# Patient Record
Sex: FEMALE | Race: WHITE | Employment: OTHER | URBAN - METROPOLITAN AREA
[De-identification: names, ages, dates, MRNs, and addresses within clinical notes are randomized per-mention and may not be internally consistent; named-entity substitution may affect disease eponyms.]

---

## 2017-09-07 ENCOUNTER — OFFICE VISIT (OUTPATIENT)
Dept: FAMILY MEDICINE CLINIC | Facility: CLINIC | Age: 62
End: 2017-09-07

## 2017-09-07 VITALS
HEART RATE: 61 BPM | DIASTOLIC BLOOD PRESSURE: 78 MMHG | WEIGHT: 120.5 LBS | BODY MASS INDEX: 23 KG/M2 | SYSTOLIC BLOOD PRESSURE: 119 MMHG

## 2017-09-07 DIAGNOSIS — H61.23 BILATERAL IMPACTED CERUMEN: Primary | ICD-10-CM

## 2017-09-07 PROCEDURE — 99213 OFFICE O/P EST LOW 20 MIN: CPT | Performed by: FAMILY MEDICINE

## 2017-09-07 PROCEDURE — 69209 REMOVE IMPACTED EAR WAX UNI: CPT | Performed by: FAMILY MEDICINE

## 2017-09-07 NOTE — PROGRESS NOTES
HPI:    Patient ID: Andie Melendez is a 58year old female. HPI  Patient presents with:  Hearing Loss: pt can't hear out of both ears    Review of Systems   Constitutional: Negative. HENT: Positive for ear pain and hearing loss.              Current

## 2017-09-12 ENCOUNTER — OFFICE VISIT (OUTPATIENT)
Dept: FAMILY MEDICINE CLINIC | Facility: CLINIC | Age: 62
End: 2017-09-12

## 2017-09-12 DIAGNOSIS — H61.23 BILATERAL IMPACTED CERUMEN: Primary | ICD-10-CM

## 2017-09-12 DIAGNOSIS — H61.22 IMPACTED CERUMEN OF LEFT EAR: ICD-10-CM

## 2017-09-12 PROCEDURE — 99213 OFFICE O/P EST LOW 20 MIN: CPT | Performed by: FAMILY MEDICINE

## 2017-09-12 PROCEDURE — 69209 REMOVE IMPACTED EAR WAX UNI: CPT | Performed by: FAMILY MEDICINE

## 2017-09-13 NOTE — PROGRESS NOTES
HPI:    Patient ID: Vincent Gooden is a 58year old female. HPI  Patient presents with:  Ear Wax: f/u bilateral ear wash    Review of Systems   Constitutional: Negative. HENT: Positive for hearing loss.              Current Outpatient Prescriptions:

## 2017-09-20 ENCOUNTER — OFFICE VISIT (OUTPATIENT)
Dept: OTOLARYNGOLOGY | Facility: CLINIC | Age: 62
End: 2017-09-20

## 2017-09-20 VITALS
TEMPERATURE: 97 F | HEIGHT: 60 IN | DIASTOLIC BLOOD PRESSURE: 76 MMHG | WEIGHT: 120 LBS | BODY MASS INDEX: 23.56 KG/M2 | SYSTOLIC BLOOD PRESSURE: 132 MMHG

## 2017-09-20 DIAGNOSIS — H61.23 BILATERAL IMPACTED CERUMEN: Primary | ICD-10-CM

## 2017-09-20 PROCEDURE — 69210 REMOVE IMPACTED EAR WAX UNI: CPT | Performed by: OTOLARYNGOLOGY

## 2017-09-20 PROCEDURE — 99203 OFFICE O/P NEW LOW 30 MIN: CPT | Performed by: OTOLARYNGOLOGY

## 2017-09-20 NOTE — PROGRESS NOTES
Karine Garrison is a 58year old female. Patient presents with:  Ear Problem: right ear cleaning, clinic dr Shirin Hodgson get wax out         HISTORY OF PRESENT ILLNESS  9/20/2017   Here for evaluation of   hearing loss.  Patient feels this has worsened over the Hema/Lymph Negative Easy bleeding and easy bruising.      PHYSICAL EXAM    /76 (BP Location: Left arm)   Temp (!) 96.6 °F (35.9 °C) (Tympanic)   Ht 5' (1.524 m)   Wt 120 lb (54.4 kg)   BMI 23.44 kg/m²     System Findings Details   Skin Normal Inspec

## 2020-02-04 ENCOUNTER — HOSPITAL ENCOUNTER (INPATIENT)
Facility: HOSPITAL | Age: 65
LOS: 7 days | Discharge: HOME HEALTH | DRG: 246 | End: 2020-02-11
Attending: EMERGENCY MEDICINE | Admitting: HOSPITALIST
Payer: MEDICARE

## 2020-02-04 ENCOUNTER — NURSE TRIAGE (OUTPATIENT)
Dept: OTHER | Age: 65
End: 2020-02-04

## 2020-02-04 ENCOUNTER — APPOINTMENT (OUTPATIENT)
Dept: INTERVENTIONAL RADIOLOGY/VASCULAR | Facility: HOSPITAL | Age: 65
DRG: 246 | End: 2020-02-04
Attending: INTERNAL MEDICINE
Payer: MEDICARE

## 2020-02-04 ENCOUNTER — APPOINTMENT (OUTPATIENT)
Dept: GENERAL RADIOLOGY | Facility: HOSPITAL | Age: 65
DRG: 246 | End: 2020-02-04
Attending: EMERGENCY MEDICINE
Payer: MEDICARE

## 2020-02-04 DIAGNOSIS — I21.3 ST ELEVATION MYOCARDIAL INFARCTION (STEMI), UNSPECIFIED ARTERY (HCC): Primary | ICD-10-CM

## 2020-02-04 LAB
ANION GAP SERPL CALC-SCNC: 12 MMOL/L (ref 0–18)
APTT PPP: 25.1 SECONDS (ref 23.2–35.3)
BASOPHILS # BLD AUTO: 0.05 X10(3) UL (ref 0–0.2)
BASOPHILS NFR BLD AUTO: 0.4 %
BUN BLD-MCNC: 10 MG/DL (ref 7–18)
BUN/CREAT SERPL: 11 (ref 10–20)
CALCIUM BLD-MCNC: 9.2 MG/DL (ref 8.5–10.1)
CHLORIDE SERPL-SCNC: 102 MMOL/L (ref 98–112)
CHOLEST SERPL-MCNC: 363 MG/DL
CHOLEST SMN-MCNC: 363 MG/DL (ref ?–200)
CHOLEST/HDLC SERPL: NORMAL {RATIO}
CO2 SERPL-SCNC: 22 MMOL/L (ref 21–32)
CREAT BLD-MCNC: 0.91 MG/DL (ref 0.55–1.02)
DEPRECATED RDW RBC AUTO: 42.7 FL (ref 35.1–46.3)
EOSINOPHIL # BLD AUTO: 0.01 X10(3) UL (ref 0–0.7)
EOSINOPHIL NFR BLD AUTO: 0.1 %
ERYTHROCYTE [DISTWIDTH] IN BLOOD BY AUTOMATED COUNT: 13.3 % (ref 11–15)
GLUCOSE BLD-MCNC: 332 MG/DL (ref 70–99)
GLUCOSE BLDC GLUCOMTR-MCNC: 299 MG/DL (ref 70–99)
HCT VFR BLD AUTO: 46.8 % (ref 35–48)
HDLC SERPL-MCNC: 48 MG/DL
HDLC SERPL-MCNC: 48 MG/DL (ref 40–59)
HGB BLD-MCNC: 15.6 G/DL (ref 12–16)
IMM GRANULOCYTES # BLD AUTO: 0.05 X10(3) UL (ref 0–1)
IMM GRANULOCYTES NFR BLD: 0.4 %
INR BLD: 0.96 (ref 0.9–1.2)
LDLC SERPL CALC-MCNC: 280 MG/DL
LDLC SERPL CALC-MCNC: 280 MG/DL (ref ?–100)
LENGTH OF FAST TIME PATIENT: NORMAL H
LYMPHOCYTES # BLD AUTO: 1.07 X10(3) UL (ref 1–4)
LYMPHOCYTES NFR BLD AUTO: 9 %
MCH RBC QN AUTO: 28.9 PG (ref 26–34)
MCHC RBC AUTO-ENTMCNC: 33.3 G/DL (ref 31–37)
MCV RBC AUTO: 86.8 FL (ref 80–100)
MONOCYTES # BLD AUTO: 0.48 X10(3) UL (ref 0.1–1)
MONOCYTES NFR BLD AUTO: 4 %
NEUTROPHILS # BLD AUTO: 10.21 X10 (3) UL (ref 1.5–7.7)
NEUTROPHILS # BLD AUTO: 10.21 X10(3) UL (ref 1.5–7.7)
NEUTROPHILS NFR BLD AUTO: 86.1 %
NONHDLC SERPL-MCNC: 315 MG/DL
NONHDLC SERPL-MCNC: 315 MG/DL (ref ?–130)
OSMOLALITY SERPL CALC.SUM OF ELEC: 294 MOSM/KG (ref 275–295)
PLATELET # BLD AUTO: 260 10(3)UL (ref 150–450)
POTASSIUM SERPL-SCNC: 3.6 MMOL/L (ref 3.5–5.1)
PROTHROMBIN TIME: 12.6 SECONDS (ref 11.8–14.5)
RBC # BLD AUTO: 5.39 X10(6)UL (ref 3.8–5.3)
SODIUM SERPL-SCNC: 136 MMOL/L (ref 136–145)
TRIGL SERPL-MCNC: 175 MG/DL
TRIGL SERPL-MCNC: 175 MG/DL (ref 30–149)
TROPONIN I SERPL-MCNC: 19.1 NG/ML (ref ?–0.04)
VLDLC SERPL CALC-MCNC: 35 MG/DL
VLDLC SERPL CALC-MCNC: 35 MG/DL (ref 0–30)
WBC # BLD AUTO: 11.9 X10(3) UL (ref 4–11)

## 2020-02-04 PROCEDURE — B240ZZ3 ULTRASONOGRAPHY OF SINGLE CORONARY ARTERY, INTRAVASCULAR: ICD-10-PCS | Performed by: INTERNAL MEDICINE

## 2020-02-04 PROCEDURE — 4A023N7 MEASUREMENT OF CARDIAC SAMPLING AND PRESSURE, LEFT HEART, PERCUTANEOUS APPROACH: ICD-10-PCS | Performed by: INTERNAL MEDICINE

## 2020-02-04 PROCEDURE — B2111ZZ FLUOROSCOPY OF MULTIPLE CORONARY ARTERIES USING LOW OSMOLAR CONTRAST: ICD-10-PCS | Performed by: INTERNAL MEDICINE

## 2020-02-04 PROCEDURE — 027036Z DILATION OF CORONARY ARTERY, ONE ARTERY WITH THREE DRUG-ELUTING INTRALUMINAL DEVICES, PERCUTANEOUS APPROACH: ICD-10-PCS | Performed by: INTERNAL MEDICINE

## 2020-02-04 PROCEDURE — 99223 1ST HOSP IP/OBS HIGH 75: CPT | Performed by: HOSPITALIST

## 2020-02-04 RX ORDER — ASPIRIN 81 MG/1
TABLET, CHEWABLE ORAL
Status: COMPLETED | OUTPATIENT
Start: 2020-02-04 | End: 2020-02-04

## 2020-02-04 RX ORDER — NITROGLYCERIN 20 MG/100ML
INJECTION INTRAVENOUS
Status: COMPLETED
Start: 2020-02-04 | End: 2020-02-04

## 2020-02-04 RX ORDER — ONDANSETRON 2 MG/ML
4 INJECTION INTRAMUSCULAR; INTRAVENOUS EVERY 6 HOURS PRN
Status: DISCONTINUED | OUTPATIENT
Start: 2020-02-04 | End: 2020-02-04

## 2020-02-04 RX ORDER — HEPARIN SODIUM 1000 [USP'U]/ML
60 INJECTION, SOLUTION INTRAVENOUS; SUBCUTANEOUS ONCE
Status: COMPLETED | OUTPATIENT
Start: 2020-02-05 | End: 2020-02-05

## 2020-02-04 RX ORDER — SODIUM CHLORIDE 0.9 % (FLUSH) 0.9 %
3 SYRINGE (ML) INJECTION AS NEEDED
Status: DISCONTINUED | OUTPATIENT
Start: 2020-02-04 | End: 2020-02-11

## 2020-02-04 RX ORDER — METOCLOPRAMIDE HYDROCHLORIDE 5 MG/ML
10 INJECTION INTRAMUSCULAR; INTRAVENOUS EVERY 6 HOURS PRN
Status: DISCONTINUED | OUTPATIENT
Start: 2020-02-04 | End: 2020-02-07

## 2020-02-04 RX ORDER — MORPHINE SULFATE 4 MG/ML
INJECTION, SOLUTION INTRAMUSCULAR; INTRAVENOUS
Status: COMPLETED
Start: 2020-02-04 | End: 2020-02-04

## 2020-02-04 RX ORDER — ONDANSETRON 2 MG/ML
INJECTION INTRAMUSCULAR; INTRAVENOUS
Status: COMPLETED
Start: 2020-02-04 | End: 2020-02-04

## 2020-02-04 RX ORDER — HEPARIN SODIUM 1000 [USP'U]/ML
INJECTION, SOLUTION INTRAVENOUS; SUBCUTANEOUS
Status: DISCONTINUED
Start: 2020-02-04 | End: 2020-02-04 | Stop reason: WASHOUT

## 2020-02-04 RX ORDER — ACETAMINOPHEN 325 MG/1
650 TABLET ORAL EVERY 6 HOURS PRN
Status: DISCONTINUED | OUTPATIENT
Start: 2020-02-04 | End: 2020-02-11

## 2020-02-04 RX ORDER — MIDAZOLAM HYDROCHLORIDE 1 MG/ML
INJECTION INTRAMUSCULAR; INTRAVENOUS
Status: COMPLETED
Start: 2020-02-04 | End: 2020-02-04

## 2020-02-04 RX ORDER — HEPARIN SODIUM AND DEXTROSE 10000; 5 [USP'U]/100ML; G/100ML
INJECTION INTRAVENOUS CONTINUOUS
Status: DISCONTINUED | OUTPATIENT
Start: 2020-02-05 | End: 2020-02-06

## 2020-02-04 RX ORDER — METOPROLOL TARTRATE 5 MG/5ML
INJECTION INTRAVENOUS
Status: COMPLETED | OUTPATIENT
Start: 2020-02-04 | End: 2020-02-04

## 2020-02-04 RX ORDER — SODIUM CHLORIDE 9 MG/ML
INJECTION, SOLUTION INTRAVENOUS CONTINUOUS
Status: ACTIVE | OUTPATIENT
Start: 2020-02-04 | End: 2020-02-05

## 2020-02-04 RX ORDER — LIDOCAINE HYDROCHLORIDE 20 MG/ML
INJECTION, SOLUTION EPIDURAL; INFILTRATION; INTRACAUDAL; PERINEURAL
Status: COMPLETED
Start: 2020-02-04 | End: 2020-02-04

## 2020-02-04 RX ORDER — ASPIRIN 81 MG/1
81 TABLET ORAL DAILY
Status: DISCONTINUED | OUTPATIENT
Start: 2020-02-05 | End: 2020-02-11

## 2020-02-04 RX ORDER — MORPHINE SULFATE 4 MG/ML
2 INJECTION, SOLUTION INTRAMUSCULAR; INTRAVENOUS ONCE
Status: COMPLETED | OUTPATIENT
Start: 2020-02-04 | End: 2020-02-04

## 2020-02-04 RX ORDER — METOCLOPRAMIDE HYDROCHLORIDE 5 MG/ML
INJECTION INTRAMUSCULAR; INTRAVENOUS
Status: DISPENSED
Start: 2020-02-04 | End: 2020-02-05

## 2020-02-04 RX ORDER — DEXTROSE MONOHYDRATE 25 G/50ML
50 INJECTION, SOLUTION INTRAVENOUS
Status: DISCONTINUED | OUTPATIENT
Start: 2020-02-04 | End: 2020-02-11

## 2020-02-04 RX ORDER — ONDANSETRON 2 MG/ML
4 INJECTION INTRAMUSCULAR; INTRAVENOUS EVERY 4 HOURS PRN
Status: DISCONTINUED | OUTPATIENT
Start: 2020-02-04 | End: 2020-02-11

## 2020-02-04 RX ORDER — HEPARIN SODIUM AND DEXTROSE 10000; 5 [USP'U]/100ML; G/100ML
12 INJECTION INTRAVENOUS ONCE
Status: COMPLETED | OUTPATIENT
Start: 2020-02-05 | End: 2020-02-05

## 2020-02-04 RX ORDER — ATORVASTATIN CALCIUM 80 MG/1
80 TABLET, FILM COATED ORAL NIGHTLY
Status: DISCONTINUED | OUTPATIENT
Start: 2020-02-04 | End: 2020-02-05

## 2020-02-04 NOTE — CONSULTS
[facility]     [facility] Cardiology Consult Note    Carlos Eduardoole Shed Patient Status:  Emergency    1955 MRN [id number]   Location [address] Attending Ravi Dooley MD   [facility] Day # 0 PCP Sisi Thomas, JVD, carotids 2+   Cardiac: Regular rate and rhythm. S1, S2 normal. No murmur, pericardial rub, S3.  Lungs: Clear without wheezes, rales, rhonchi or dullness. Normal excursions and effort. Abdomen: Soft, non-tender. BS-present.   Extremities: Without club

## 2020-02-04 NOTE — ED INITIAL ASSESSMENT (HPI)
Patient axo3 to ed via private vehicle patient co of left sided chest pain non radiating +sob +nausea denies vomiting/dizziness hx cva

## 2020-02-04 NOTE — ED PROVIDER NOTES
Patient Seen in: [facility] AND [facility] Emergency Department      History   Patient presents with:  Chest Pain Angina    Stated Complaint: chest pain    HPI    70-year-old female presents the ER with complaint of left-sided chest pressure that started approxi Conjunctivae normal.      Pupils: Pupils are equal, round, and reactive to light. Neck:      Musculoskeletal: Normal range of motion and neck supple. Cardiovascular:      Rate and Rhythm: Normal rate and regular rhythm. Pulses: Normal pulses. 1754  ------------------------------------------------------------  Time: 02/04 1750  Comment: D/W Dr. Jhaveri Bring in the ER, states that will take patient to the Cath lab.   Dr. Lashay Hallman notified             MDM     57-year-old female presents the ER with complaint

## 2020-02-05 ENCOUNTER — APPOINTMENT (OUTPATIENT)
Dept: GENERAL RADIOLOGY | Facility: HOSPITAL | Age: 65
DRG: 246 | End: 2020-02-05
Attending: INTERNAL MEDICINE
Payer: MEDICARE

## 2020-02-05 ENCOUNTER — APPOINTMENT (OUTPATIENT)
Dept: CV DIAGNOSTICS | Facility: HOSPITAL | Age: 65
DRG: 246 | End: 2020-02-05
Attending: INTERNAL MEDICINE
Payer: MEDICARE

## 2020-02-05 PROBLEM — F31.4 SEVERE BIPOLAR I DISORDER, MOST RECENT EPISODE DEPRESSED (HCC): Status: ACTIVE | Noted: 2020-02-05

## 2020-02-05 LAB
ANION GAP SERPL CALC-SCNC: 12 MMOL/L (ref 0–18)
APTT PPP: 27.4 SECONDS (ref 23.2–35.3)
APTT PPP: 62 SECONDS (ref 23.2–35.3)
BASOPHILS # BLD AUTO: 0.02 X10(3) UL (ref 0–0.2)
BASOPHILS NFR BLD AUTO: 0.2 %
BUN BLD-MCNC: 9 MG/DL (ref 7–18)
BUN/CREAT SERPL: 12 (ref 10–20)
CALCIUM BLD-MCNC: 9 MG/DL (ref 8.5–10.1)
CHLORIDE SERPL-SCNC: 109 MMOL/L (ref 98–112)
CO2 SERPL-SCNC: 18 MMOL/L (ref 21–32)
CREAT BLD-MCNC: 0.75 MG/DL (ref 0.55–1.02)
DEPRECATED RDW RBC AUTO: 44 FL (ref 35.1–46.3)
EOSINOPHIL # BLD AUTO: 0 X10(3) UL (ref 0–0.7)
EOSINOPHIL NFR BLD AUTO: 0 %
ERYTHROCYTE [DISTWIDTH] IN BLOOD BY AUTOMATED COUNT: 13.7 % (ref 11–15)
EST. AVERAGE GLUCOSE BLD GHB EST-MCNC: 318 MG/DL (ref 68–126)
GLUCOSE BLD-MCNC: 269 MG/DL (ref 70–99)
GLUCOSE BLDC GLUCOMTR-MCNC: 101 MG/DL (ref 70–99)
GLUCOSE BLDC GLUCOMTR-MCNC: 188 MG/DL (ref 70–99)
GLUCOSE BLDC GLUCOMTR-MCNC: 258 MG/DL (ref 70–99)
GLUCOSE BLDC GLUCOMTR-MCNC: 283 MG/DL (ref 70–99)
GLUCOSE BLDC GLUCOMTR-MCNC: 307 MG/DL (ref 70–99)
HBA1C MFR BLD HPLC: 12.7 % (ref ?–5.7)
HCT VFR BLD AUTO: 46.1 % (ref 35–48)
HGB BLD-MCNC: 15.3 G/DL (ref 12–16)
IMM GRANULOCYTES # BLD AUTO: 0.04 X10(3) UL (ref 0–1)
IMM GRANULOCYTES NFR BLD: 0.4 %
LYMPHOCYTES # BLD AUTO: 0.99 X10(3) UL (ref 1–4)
LYMPHOCYTES NFR BLD AUTO: 9.4 %
MCH RBC QN AUTO: 29.2 PG (ref 26–34)
MCHC RBC AUTO-ENTMCNC: 33.2 G/DL (ref 31–37)
MCV RBC AUTO: 88 FL (ref 80–100)
MONOCYTES # BLD AUTO: 0.66 X10(3) UL (ref 0.1–1)
MONOCYTES NFR BLD AUTO: 6.2 %
NEUTROPHILS # BLD AUTO: 8.86 X10 (3) UL (ref 1.5–7.7)
NEUTROPHILS # BLD AUTO: 8.86 X10(3) UL (ref 1.5–7.7)
NEUTROPHILS NFR BLD AUTO: 83.8 %
OSMOLALITY SERPL CALC.SUM OF ELEC: 296 MOSM/KG (ref 275–295)
PLATELET # BLD AUTO: 249 10(3)UL (ref 150–450)
POTASSIUM SERPL-SCNC: 4.2 MMOL/L (ref 3.5–5.1)
RBC # BLD AUTO: 5.24 X10(6)UL (ref 3.8–5.3)
SODIUM SERPL-SCNC: 139 MMOL/L (ref 136–145)
TROPONIN I SERPL-MCNC: 78.6 NG/ML (ref ?–0.04)
TSH SERPL-ACNC: 2.67 M[IU]/L
TSI SER-ACNC: 2.67 MIU/ML (ref 0.36–3.74)
VIT B12 SERPL-MCNC: 257 PG/ML (ref 193–986)
WBC # BLD AUTO: 10.6 X10(3) UL (ref 4–11)

## 2020-02-05 PROCEDURE — 93306 TTE W/DOPPLER COMPLETE: CPT | Performed by: INTERNAL MEDICINE

## 2020-02-05 PROCEDURE — 71045 X-RAY EXAM CHEST 1 VIEW: CPT | Performed by: INTERNAL MEDICINE

## 2020-02-05 PROCEDURE — 99233 SBSQ HOSP IP/OBS HIGH 50: CPT | Performed by: HOSPITALIST

## 2020-02-05 PROCEDURE — 90792 PSYCH DIAG EVAL W/MED SRVCS: CPT | Performed by: OTHER

## 2020-02-05 RX ORDER — LISINOPRIL 2.5 MG/1
2.5 TABLET ORAL DAILY
Status: DISCONTINUED | OUTPATIENT
Start: 2020-02-05 | End: 2020-02-11

## 2020-02-05 RX ORDER — NITROGLYCERIN 20 MG/100ML
INJECTION INTRAVENOUS
Status: DISPENSED
Start: 2020-02-05 | End: 2020-02-06

## 2020-02-05 RX ORDER — DEXTROSE MONOHYDRATE 25 G/50ML
50 INJECTION, SOLUTION INTRAVENOUS
Status: DISCONTINUED | OUTPATIENT
Start: 2020-02-05 | End: 2020-02-05

## 2020-02-05 RX ORDER — NITROGLYCERIN 20 MG/100ML
INJECTION INTRAVENOUS CONTINUOUS
Status: DISCONTINUED | OUTPATIENT
Start: 2020-02-05 | End: 2020-02-07

## 2020-02-05 RX ORDER — ARIPIPRAZOLE 2 MG/1
1 TABLET ORAL NIGHTLY
Status: DISCONTINUED | OUTPATIENT
Start: 2020-02-05 | End: 2020-02-07

## 2020-02-05 RX ORDER — HYDROMORPHONE HYDROCHLORIDE 1 MG/ML
INJECTION, SOLUTION INTRAMUSCULAR; INTRAVENOUS; SUBCUTANEOUS
Status: DISPENSED
Start: 2020-02-05 | End: 2020-02-06

## 2020-02-05 RX ORDER — FLUOXETINE 10 MG/1
10 CAPSULE ORAL DAILY
Status: DISCONTINUED | OUTPATIENT
Start: 2020-02-05 | End: 2020-02-05

## 2020-02-05 RX ORDER — ESCITALOPRAM OXALATE 5 MG/1
5 TABLET ORAL NIGHTLY
Status: DISCONTINUED | OUTPATIENT
Start: 2020-02-05 | End: 2020-02-11

## 2020-02-05 RX ORDER — NITROGLYCERIN 0.4 MG/1
0.4 TABLET SUBLINGUAL EVERY 5 MIN PRN
Status: DISCONTINUED | OUTPATIENT
Start: 2020-02-05 | End: 2020-02-11

## 2020-02-05 RX ORDER — ATORVASTATIN CALCIUM 20 MG/1
20 TABLET, FILM COATED ORAL NIGHTLY
Status: DISCONTINUED | OUTPATIENT
Start: 2020-02-05 | End: 2020-02-07

## 2020-02-05 RX ORDER — HYDROMORPHONE HYDROCHLORIDE 1 MG/ML
0.4 INJECTION, SOLUTION INTRAMUSCULAR; INTRAVENOUS; SUBCUTANEOUS ONCE
Status: COMPLETED | OUTPATIENT
Start: 2020-02-06 | End: 2020-02-05

## 2020-02-05 RX ORDER — NITROGLYCERIN 0.4 MG/1
TABLET SUBLINGUAL
Status: DISPENSED
Start: 2020-02-05 | End: 2020-02-06

## 2020-02-05 RX ORDER — PANTOPRAZOLE SODIUM 40 MG/1
40 TABLET, DELAYED RELEASE ORAL
Status: DISCONTINUED | OUTPATIENT
Start: 2020-02-05 | End: 2020-02-11

## 2020-02-05 NOTE — PROCEDURES
[facility]    PATIENT'S NAME: Ced Beltre   ATTENDING PHYSICIAN: Freddy Kim MD   OPERATING PHYSICIAN: Ashwini Melton.  Adelia Smith MD   PATIENT ACCOUNT#:   [id number]    LOCATION:  [address] [place] [address] Road #:   [id number]       DATE OF BIRTH: and fully deployed. There appeared to be a proximal retrograde dissection and some area that was still in need of further intervention, so I placed another stent to the ostium of the LAD with some overlap in the previously-mentioned stent.   Thereafter, I

## 2020-02-05 NOTE — H&P
[facility]    PATIENT'S NAME: Aicha Villarreal PHYSICIAN: Aakash Griggs MD   PATIENT ACCOUNT#:   [id number]    LOCATION:  [place] [address]  MEDICAL RECORD #:   [id number]       YOB: 1955  ADMISSION DATE:       02/04 distress. Slightly anxious. VITAL SIGNS:  Temperature 98.3, pulse 86, respiratory rate 18, blood pressure 150/69, pulse ox 100% on room air. HEENT:  Atraumatic. Oropharynx clear. Moist mucous membranes. Ears, nose normal.  Eyes:  Anicteric sclerae.

## 2020-02-05 NOTE — PROGRESS NOTES
[facility] - [facility]    Brief Cardiac Cath Note  Gabriel Jordan Patient Status:  Emergency    1955 MRN [id number]   Location [address] Attending Stephanie Brown MD   Hosp Day # 0 PCP Maya Mir DO

## 2020-02-05 NOTE — DIETARY NOTE
NUTRITION:  Diet Education    Consult received for diet education per protocol. Education deferred until s/p intervention and when appropriate for teaching.     Josey Mckeon RDN, LDN  Clinical Nutrition  Ext [phone]

## 2020-02-05 NOTE — PROGRESS NOTES
Cath note dictated  Totally occluded mid lad at diag bifurcation which had 90% ostial lesion  diag ballooned with 2 mm balloon  Lad stented with 2.5 mm waqar stent in mid segment  Overlapping 3.0 mm waqar stents proximally to ostium  ivus revealed good appos

## 2020-02-05 NOTE — CONSULTS
[facility] - [facility]    Report of Consultation    Jeancarlos Hussein Patient Status:  Inpatient    1955 MRN [id number]   Location [facility] 2W/SW Attending Kathrin Subramanian MD   Hosp Day # 1 PCP Jennifer Diaz, DO     Date of Admissio irritable. Patient admitted having difficulty sleeping with racing thought and alternation in her mood and emotions.     According to her  the patient has been taking the same medication as Dr. Dalia Lyons used to give her but may be it is generic and pa Medications:  lisinopril tab 2.5 mg, 2.5 mg, Oral, Daily  Pantoprazole Sodium (PROTONIX) EC tab 40 mg, 40 mg, Oral, QAM AC  glucose (DEX4) oral liquid 15 g, 15 g, Oral, Q15 Min PRN    Or  Glucose-Vitamin C (DEX-4) chewable tab 4 tablet, 4 tablet, Oral, Q15 HCl (REGLAN) injection 10 mg, 10 mg, Intravenous, Q6H PRN      FLUoxetine HCl (PROZAC) 10 MG Oral Cap, Take 10 mg by mouth daily. Atorvastatin Calcium (LIPITOR) 20 MG Oral Tab, Take 1 tablet by mouth nightly.  (Patient not taking: Reported on 2/4/2020 ) was oriented otherwise patient utilizing some concrete thought the process and exhibited cognitive distortion.   Judgment and insight are impaired with patient having difficulty understanding her emotional condition even she have chronic history of bipolar requested or ordered in this encounter           Nii Giron MD  2/5/2020

## 2020-02-05 NOTE — PLAN OF CARE
Problem: Patient Centered Care  Goal: Patient preferences are identified and integrated in the patient's plan of care  Description  Interventions:  - What would you like us to know as we care for you?  - Provide timely, complete, and accurate information temperature  - Assess for signs of decreased coronary artery perfusion - ex.  Angina  - Evaluate fluid balance, assess for edema, trend weights  Outcome: Progressing     Problem: RESPIRATORY - ADULT  Goal: Achieves optimal ventilation and oxygenation  Descr percentage of each meal consumed  - Identify factors contributing to decreased intake, treat as appropriate  - Assist with meals as needed  - Monitor I&O, WT and lab values  - Obtain nutritional consult as needed  - Optimize oral hygiene and moisture  - En

## 2020-02-05 NOTE — PROGRESS NOTES
[facility]     [facility] Cardiology Progress Note    Jeancarlos Hussein Patient Status:  Inpatient    1955 MRN [id number]   Location [facility] 2W/SW Attending Kentrell Bobby MD   [facility] Day # 1 PCP DO Kathya Soto Pen  1-7 Units Subcutaneous TID CC and HS   • Metoclopramide HCl         • Continuous dose Heparin infusion         Labs:  Recent Labs   Lab 02/05/20  0526   RBC 5.24   HGB 15.3   HCT 46.1   MCV 88.0   MCH 29.2   MCHC 33.2   RDW 13.7   NEPRELIM 8.86*   WBC the apex in a wrap-around fashion with normal contractility at the base. Estimated ejection fraction of no more than 30% (akinesis of anterior wall and apex). There is no mitral regurgitation. End-diastolic pressure is 14.       Assessment and Plan:  72

## 2020-02-05 NOTE — PROGRESS NOTES
[facility]    Progress Note    Camillain Ayon Patient Status:  Inpatient    1955 MRN [id number]   Location [facility] 2W/SW Attending Sammie Cheng MD   [facility] Day # 1 PCP Celia Cochran DO       Subjective:   [facility] Subcutaneous Nightly   • ticagrelor  90 mg Oral Q12H   • aspirin  81 mg Oral Daily   • metoprolol Tartrate  25 mg Oral 2x Daily(Beta Blocker)   • atorvastatin  80 mg Oral Nightly   • Insulin Aspart Pen  1-7 Units Subcutaneous TID CC and HS         Assessme

## 2020-02-05 NOTE — PLAN OF CARE
Problem: Patient Centered Care  Goal: Patient preferences are identified and integrated in the patient's plan of care  Description  Interventions:  - What would you like us to know as we care for you?  - Provide timely, complete, and accurate information artery perfusion - ex.  Angina  - Evaluate fluid balance, assess for edema, trend weights  Outcome: Progressing     Problem: RESPIRATORY - ADULT  Goal: Achieves optimal ventilation and oxygenation  Description  INTERVENTIONS:  - Assess for changes in respir contributing to decreased intake, treat as appropriate  - Assist with meals as needed  - Monitor I&O, WT and lab values  - Obtain nutritional consult as needed  - Optimize oral hygiene and moisture  - Encourage food from home; allow for food preferences  -

## 2020-02-06 ENCOUNTER — APPOINTMENT (OUTPATIENT)
Dept: CV DIAGNOSTICS | Facility: HOSPITAL | Age: 65
DRG: 246 | End: 2020-02-06
Attending: INTERNAL MEDICINE
Payer: MEDICARE

## 2020-02-06 PROBLEM — E78.00 PURE HYPERCHOLESTEROLEMIA: Status: ACTIVE | Noted: 2020-02-06

## 2020-02-06 LAB
ANION GAP SERPL CALC-SCNC: 12 MMOL/L (ref 0–18)
APTT PPP: 62.1 SECONDS (ref 23.2–35.3)
BASOPHILS # BLD AUTO: 0.04 X10(3) UL (ref 0–0.2)
BASOPHILS NFR BLD AUTO: 0.3 %
BUN BLD-MCNC: 18 MG/DL (ref 7–18)
BUN/CREAT SERPL: 21.4 (ref 10–20)
CALCIUM BLD-MCNC: 7.9 MG/DL (ref 8.5–10.1)
CHLORIDE SERPL-SCNC: 106 MMOL/L (ref 98–112)
CO2 SERPL-SCNC: 19 MMOL/L (ref 21–32)
CREAT BLD-MCNC: 0.84 MG/DL (ref 0.55–1.02)
DEPRECATED RDW RBC AUTO: 46.6 FL (ref 35.1–46.3)
EOSINOPHIL # BLD AUTO: 0 X10(3) UL (ref 0–0.7)
EOSINOPHIL NFR BLD AUTO: 0 %
ERYTHROCYTE [DISTWIDTH] IN BLOOD BY AUTOMATED COUNT: 14 % (ref 11–15)
ERYTHROCYTE [SEDIMENTATION RATE] IN BLOOD: 28 MM/HR (ref 0–30)
GLUCOSE BLD-MCNC: 236 MG/DL (ref 70–99)
GLUCOSE BLDC GLUCOMTR-MCNC: 165 MG/DL (ref 70–99)
GLUCOSE BLDC GLUCOMTR-MCNC: 173 MG/DL (ref 70–99)
GLUCOSE BLDC GLUCOMTR-MCNC: 216 MG/DL (ref 70–99)
GLUCOSE BLDC GLUCOMTR-MCNC: 226 MG/DL (ref 70–99)
HCT VFR BLD AUTO: 37.1 % (ref 35–48)
HGB BLD-MCNC: 12.4 G/DL (ref 12–16)
IMM GRANULOCYTES # BLD AUTO: 0.07 X10(3) UL (ref 0–1)
IMM GRANULOCYTES NFR BLD: 0.4 %
LYMPHOCYTES # BLD AUTO: 1.19 X10(3) UL (ref 1–4)
LYMPHOCYTES NFR BLD AUTO: 7.6 %
MCH RBC QN AUTO: 30.2 PG (ref 26–34)
MCHC RBC AUTO-ENTMCNC: 33.4 G/DL (ref 31–37)
MCV RBC AUTO: 90.5 FL (ref 80–100)
MONOCYTES # BLD AUTO: 1.53 X10(3) UL (ref 0.1–1)
MONOCYTES NFR BLD AUTO: 9.7 %
MRSA DNA SPEC QL NAA+PROBE: NEGATIVE
NEUTROPHILS # BLD AUTO: 12.93 X10 (3) UL (ref 1.5–7.7)
NEUTROPHILS # BLD AUTO: 12.93 X10(3) UL (ref 1.5–7.7)
NEUTROPHILS NFR BLD AUTO: 82 %
OSMOLALITY SERPL CALC.SUM OF ELEC: 294 MOSM/KG (ref 275–295)
PLATELET # BLD AUTO: 199 10(3)UL (ref 150–450)
POTASSIUM SERPL-SCNC: 3.8 MMOL/L (ref 3.5–5.1)
RBC # BLD AUTO: 4.1 X10(6)UL (ref 3.8–5.3)
SODIUM SERPL-SCNC: 137 MMOL/L (ref 136–145)
TROPONIN I SERPL-MCNC: 18.3 NG/ML (ref ?–0.04)
WBC # BLD AUTO: 15.8 X10(3) UL (ref 4–11)

## 2020-02-06 PROCEDURE — 93306 TTE W/DOPPLER COMPLETE: CPT | Performed by: INTERNAL MEDICINE

## 2020-02-06 PROCEDURE — 99233 SBSQ HOSP IP/OBS HIGH 50: CPT | Performed by: HOSPITALIST

## 2020-02-06 PROCEDURE — 99233 SBSQ HOSP IP/OBS HIGH 50: CPT | Performed by: OTHER

## 2020-02-06 RX ORDER — METOPROLOL TARTRATE 5 MG/5ML
INJECTION INTRAVENOUS
Status: DISPENSED
Start: 2020-02-06 | End: 2020-02-06

## 2020-02-06 RX ORDER — METOPROLOL TARTRATE 5 MG/5ML
2.5 INJECTION INTRAVENOUS EVERY 4 HOURS PRN
Status: DISCONTINUED | OUTPATIENT
Start: 2020-02-06 | End: 2020-02-11

## 2020-02-06 RX ORDER — LORAZEPAM 2 MG/ML
0.5 INJECTION INTRAMUSCULAR EVERY 4 HOURS PRN
Status: DISCONTINUED | OUTPATIENT
Start: 2020-02-06 | End: 2020-02-07

## 2020-02-06 RX ORDER — SODIUM CHLORIDE 9 MG/ML
INJECTION, SOLUTION INTRAVENOUS CONTINUOUS
Status: DISCONTINUED | OUTPATIENT
Start: 2020-02-06 | End: 2020-02-07

## 2020-02-06 NOTE — CARDIAC REHAB
Cardiac Rehab Phase I    Activity:  Distance In room  Assistance needed   Patient tolerated activity . Education:  Handouts provided and reviewed: [address] Diet: Healthy Cardiac diet reviewed.     Disease Process: Disease proc

## 2020-02-06 NOTE — PROGRESS NOTES
Dr Char Arzola paged at 23:34 and 23:52, returned page at 00:09,notified of events, pt resting quietly after dilaudid at 23:48, drowsy but says she feels a tightness, then fell asleep, while this RN was speaking w/ Dr Char Arzola reviewed ekg and does not see much rene

## 2020-02-06 NOTE — PROGRESS NOTES
[facility]    Cardiology Progress Note  [facility]    Amira Wills Patient Status:  Inpatient    1955 MRN [id number]   Location [facility] 2W/SW Attending Anastasiia Alonso MD   Hosp Day # 2 PCP [facility] 02/05/20 2300 127/79 — — 101 21 97 % —   02/05/20 2200 123/87 — — 99 24 99 % —   02/05/20 2100 114/74 — — 87 17 100 % —   02/05/20 2000 97/66 98.9 °F (37.2 °C) — 95 (!) 27 100 % —   02/05/20 1900 99/71 — — 81 16 100 % —   02/05/20 1700 92/62 — — 83 16 99 WBC 15.8 (H) 02/06/2020    HGB 12.4 02/06/2020    HCT 37.1 02/06/2020    .0 02/06/2020    CREATSERUM 0.84 02/06/2020    BUN 18 02/06/2020     02/06/2020    K 3.8 02/06/2020     02/06/2020    CO2 19.0 (L) 02/06/2020     (H) 02/0 Report  Interpretation  -------------------------- Sinus Tachycardia -Short MS syndrome Elly = 108 -Right atrial enlargement.  -Anterior infarct -age undetermined. - Negative T-waves - Anterolateral ischemia.  ABNORMAL When compared with ECG of 02/05/2020 0 rhythm, no murmur, rub, extrasounds   Ext: No edema  Abd: Abdomen soft, nontender, nondistended, no organomegaly, bowel sounds present      Assessment   Assessment and Plan:     ST elevation myocardial infarction (STEMI), unspecified artery ([facility] [address]  Initial

## 2020-02-06 NOTE — PROGRESS NOTES
23:20  Called to rm by pt, c/o \"my chest hurts\"  Gave it a #7, not sob, st 100s, bp stable, ntg .4mg sl, placed on 02 2lnc,  ekg done, ntg repeated w/o relief, Dr Aakash Cristobal notified, pt was very nauseated all nite after cath last nite, despite antiemetics, th

## 2020-02-06 NOTE — DIABETES ED
Order for Diabetes education received. Per RN patient not appropriate for education today. Will continue to monitor and revisit when appropriate.

## 2020-02-06 NOTE — PLAN OF CARE
Problem: Patient Centered Care  Goal: Patient preferences are identified and integrated in the patient's plan of care  Description  Interventions:  - What would you like us to know as we care for you?   - Provide timely, complete, and accurate informatio rhythm, and trends  - Monitor for bleeding, hypotension and signs of decreased cardiac output  - Evaluate effectiveness of vasoactive medications to optimize hemodynamic stability  - Monitor arterial and/or venous puncture sites for bleeding and/or hematom RN  Outcome: Progressing  Goal: Maintains or returns to baseline bowel function  Description  INTERVENTIONS:  - Assess bowel function  - Maintain adequate hydration with IV or PO as ordered and tolerated  - Evaluate effectiveness of GI medications  - Encou activity based on assessment  - Modify environment to reduce risk of injury  - Provide assistive devices as appropriate  - Consider OT/PT consult to assist with strengthening/mobility  - Encourage toileting schedule  Outcome: Progressing   Pt up to br on p

## 2020-02-07 ENCOUNTER — APPOINTMENT (OUTPATIENT)
Dept: GENERAL RADIOLOGY | Facility: HOSPITAL | Age: 65
DRG: 246 | End: 2020-02-07
Attending: HOSPITALIST
Payer: MEDICARE

## 2020-02-07 PROBLEM — I25.5 ISCHEMIC CARDIOMYOPATHY: Status: ACTIVE | Noted: 2020-02-07

## 2020-02-07 LAB
ANION GAP SERPL CALC-SCNC: 7 MMOL/L (ref 0–18)
ANION GAP SERPL CALC-SCNC: 9 MMOL/L (ref 0–18)
BASOPHILS # BLD AUTO: 0.02 X10(3) UL (ref 0–0.2)
BASOPHILS NFR BLD AUTO: 0.2 %
BUN BLD-MCNC: 16 MG/DL (ref 7–18)
BUN BLD-MCNC: 18 MG/DL (ref 7–18)
BUN/CREAT SERPL: 18.6 (ref 10–20)
BUN/CREAT SERPL: 25.7 (ref 10–20)
CALCIUM BLD-MCNC: 7.7 MG/DL (ref 8.5–10.1)
CALCIUM BLD-MCNC: 8.4 MG/DL (ref 8.5–10.1)
CHLORIDE SERPL-SCNC: 110 MMOL/L (ref 98–112)
CHLORIDE SERPL-SCNC: 112 MMOL/L (ref 98–112)
CO2 SERPL-SCNC: 19 MMOL/L (ref 21–32)
CO2 SERPL-SCNC: 20 MMOL/L (ref 21–32)
CREAT BLD-MCNC: 0.7 MG/DL (ref 0.55–1.02)
CREAT BLD-MCNC: 0.86 MG/DL (ref 0.55–1.02)
DEPRECATED RDW RBC AUTO: 44 FL (ref 35.1–46.3)
EOSINOPHIL # BLD AUTO: 0.01 X10(3) UL (ref 0–0.7)
EOSINOPHIL NFR BLD AUTO: 0.1 %
ERYTHROCYTE [DISTWIDTH] IN BLOOD BY AUTOMATED COUNT: 13.7 % (ref 11–15)
GLUCOSE BLD-MCNC: 198 MG/DL (ref 70–99)
GLUCOSE BLD-MCNC: 234 MG/DL (ref 70–99)
GLUCOSE BLDC GLUCOMTR-MCNC: 204 MG/DL (ref 70–99)
GLUCOSE BLDC GLUCOMTR-MCNC: 207 MG/DL (ref 70–99)
GLUCOSE BLDC GLUCOMTR-MCNC: 227 MG/DL (ref 70–99)
HCT VFR BLD AUTO: 32.3 % (ref 35–48)
HGB BLD-MCNC: 11 G/DL (ref 12–16)
IMM GRANULOCYTES # BLD AUTO: 0.06 X10(3) UL (ref 0–1)
IMM GRANULOCYTES NFR BLD: 0.6 %
LYMPHOCYTES # BLD AUTO: 0.92 X10(3) UL (ref 1–4)
LYMPHOCYTES NFR BLD AUTO: 9.5 %
MCH RBC QN AUTO: 29.8 PG (ref 26–34)
MCHC RBC AUTO-ENTMCNC: 34.1 G/DL (ref 31–37)
MCV RBC AUTO: 87.5 FL (ref 80–100)
MONOCYTES # BLD AUTO: 0.8 X10(3) UL (ref 0.1–1)
MONOCYTES NFR BLD AUTO: 8.3 %
NEUTROPHILS # BLD AUTO: 7.86 X10 (3) UL (ref 1.5–7.7)
NEUTROPHILS # BLD AUTO: 7.86 X10(3) UL (ref 1.5–7.7)
NEUTROPHILS NFR BLD AUTO: 81.3 %
OSMOLALITY SERPL CALC.SUM OF ELEC: 295 MOSM/KG (ref 275–295)
OSMOLALITY SERPL CALC.SUM OF ELEC: 295 MOSM/KG (ref 275–295)
PLATELET # BLD AUTO: 184 10(3)UL (ref 150–450)
POTASSIUM SERPL-SCNC: 3.1 MMOL/L (ref 3.5–5.1)
POTASSIUM SERPL-SCNC: 4 MMOL/L (ref 3.5–5.1)
RBC # BLD AUTO: 3.69 X10(6)UL (ref 3.8–5.3)
SODIUM SERPL-SCNC: 138 MMOL/L (ref 136–145)
SODIUM SERPL-SCNC: 139 MMOL/L (ref 136–145)
TROPONIN I SERPL-MCNC: 10.8 NG/ML (ref ?–0.04)
WBC # BLD AUTO: 9.7 X10(3) UL (ref 4–11)

## 2020-02-07 PROCEDURE — 99291 CRITICAL CARE FIRST HOUR: CPT | Performed by: HOSPITALIST

## 2020-02-07 PROCEDURE — 99233 SBSQ HOSP IP/OBS HIGH 50: CPT | Performed by: HOSPITALIST

## 2020-02-07 PROCEDURE — 71045 X-RAY EXAM CHEST 1 VIEW: CPT | Performed by: HOSPITALIST

## 2020-02-07 RX ORDER — ALPRAZOLAM 0.25 MG/1
0.25 TABLET ORAL 2 TIMES DAILY PRN
Status: DISCONTINUED | OUTPATIENT
Start: 2020-02-07 | End: 2020-02-11

## 2020-02-07 RX ORDER — METHYLPREDNISOLONE SODIUM SUCCINATE 40 MG/ML
INJECTION, POWDER, LYOPHILIZED, FOR SOLUTION INTRAMUSCULAR; INTRAVENOUS
Status: COMPLETED
Start: 2020-02-07 | End: 2020-02-07

## 2020-02-07 RX ORDER — ATORVASTATIN CALCIUM 40 MG/1
80 TABLET, FILM COATED ORAL NIGHTLY
Status: DISCONTINUED | OUTPATIENT
Start: 2020-02-07 | End: 2020-02-11

## 2020-02-07 RX ORDER — METHYLPREDNISOLONE SODIUM SUCCINATE 40 MG/ML
40 INJECTION, POWDER, LYOPHILIZED, FOR SOLUTION INTRAMUSCULAR; INTRAVENOUS ONCE
Status: COMPLETED | OUTPATIENT
Start: 2020-02-07 | End: 2020-02-07

## 2020-02-07 RX ORDER — ARIPIPRAZOLE 2 MG/1
2 TABLET ORAL NIGHTLY
Status: DISCONTINUED | OUTPATIENT
Start: 2020-02-07 | End: 2020-02-11

## 2020-02-07 RX ORDER — METHYLPREDNISOLONE SODIUM SUCCINATE 40 MG/ML
40 INJECTION, POWDER, LYOPHILIZED, FOR SOLUTION INTRAMUSCULAR; INTRAVENOUS EVERY 6 HOURS
Status: DISCONTINUED | OUTPATIENT
Start: 2020-02-07 | End: 2020-02-10

## 2020-02-07 RX ORDER — IPRATROPIUM BROMIDE AND ALBUTEROL SULFATE 2.5; .5 MG/3ML; MG/3ML
SOLUTION RESPIRATORY (INHALATION)
Status: COMPLETED
Start: 2020-02-07 | End: 2020-02-07

## 2020-02-07 RX ORDER — FUROSEMIDE 10 MG/ML
40 INJECTION INTRAMUSCULAR; INTRAVENOUS ONCE
Status: COMPLETED | OUTPATIENT
Start: 2020-02-07 | End: 2020-02-07

## 2020-02-07 RX ORDER — METOPROLOL SUCCINATE 50 MG/1
50 TABLET, EXTENDED RELEASE ORAL
Status: DISCONTINUED | OUTPATIENT
Start: 2020-02-08 | End: 2020-02-11

## 2020-02-07 RX ORDER — POTASSIUM CHLORIDE 14.9 MG/ML
20 INJECTION INTRAVENOUS ONCE
Status: COMPLETED | OUTPATIENT
Start: 2020-02-07 | End: 2020-02-07

## 2020-02-07 RX ORDER — IPRATROPIUM BROMIDE AND ALBUTEROL SULFATE 2.5; .5 MG/3ML; MG/3ML
3 SOLUTION RESPIRATORY (INHALATION)
Status: DISCONTINUED | OUTPATIENT
Start: 2020-02-07 | End: 2020-02-09

## 2020-02-07 RX ORDER — FUROSEMIDE 10 MG/ML
INJECTION INTRAMUSCULAR; INTRAVENOUS
Status: COMPLETED
Start: 2020-02-07 | End: 2020-02-07

## 2020-02-07 NOTE — PROGRESS NOTES
[facility]    Progress Note    Michelle Diehl Patient Status:  Inpatient    1955 MRN [id number]   Location [facility] 2W/SW Attending Rocio Clayton MD   [facility] Day # 2 PCP Antoine Ruiz DO       Subjective:   Megan Lugo Interpretation  -------------------------- Sinus Rhythm -Short [place] syndrome - occasional PAC Elly = 107 . Low voltage in precordial leads.  -Right atrial enlargement.  -Anterior infarct -age undetermined. - Negative T-waves - Anterolateral ischemia.  ABNORM Oral 2x Daily(Beta Blocker)         Assessment and Plan:     ST elevation myocardial infarction (STEMI), unspecified artery (HCC)  - status post left heart cath with drug eluting stent of the mid and proximal LAD  - continue on ticagrelor, lisinopril and a

## 2020-02-07 NOTE — DIETARY NOTE
DIABETES NUTRITION ASSESSMENT:    Patient busy with RN at time of visit, spoke with pt's  who reports pt not receptive to education on diet. Hx of DM but non-compliant with diet/Dm guidelines at home Hgb A1C=12.7%.  Per  pt eats what she wants

## 2020-02-07 NOTE — PROGRESS NOTES
S-  visited with patient and family after RRT  O- Patient and  talked about present concerns, they have no other family      No request for prayer, patient is becoming more calm  A-  provided active listening and emotional support  P

## 2020-02-07 NOTE — DIABETES ED
Education not appropriate at this time due to RRT. Will continue to monitor and return for diabetes education when appropriate.

## 2020-02-07 NOTE — PLAN OF CARE
Patient with persistent chest pain throughout day, remains on nitro drip. Heparin dc'd. Npo most of day, blood glucose stable. On bedrest. Echo done today.  updated on poc. Purewick in place. Continue to monitor.    Problem: Patient Centered Care  Go medications to optimize hemodynamic stability  - Monitor arterial and/or venous puncture sites for bleeding and/or hematoma  - Assess quality of pulses, skin color and temperature  - Assess for signs of decreased coronary artery perfusion - ex.  Angina  - E pharmacy to review patient's medication profile  Outcome: Progressing  Goal: Maintains adequate nutritional intake (undernourished)  Description  INTERVENTIONS:  - Monitor percentage of each meal consumed  - Identify factors contributing to decreased intak

## 2020-02-07 NOTE — PLAN OF CARE
A/Ox4. No  acute resp distress on RA. Chest pain remains mild, 3-4/10 which is tolerable for the pt. On Nitro gtt. Sleeps well. No other issues and complaints. Call light within reach. Will cont to  monitor.      Problem: Patient Centered Care  Goal: medications to optimize hemodynamic stability  - Monitor arterial and/or venous puncture sites for bleeding and/or hematoma  - Assess quality of pulses, skin color and temperature  - Assess for signs of decreased coronary artery perfusion - ex.  Angina  - E pharmacy to review patient's medication profile  Outcome: Progressing  Goal: Maintains adequate nutritional intake (undernourished)  Description  INTERVENTIONS:  - Monitor percentage of each meal consumed  - Identify factors contributing to decreased intak

## 2020-02-07 NOTE — PLAN OF CARE
4 mins of tachycardic rate of 140's while pt sleeping. Asymptomatic. Denies chest pain. EKG done. Scheduled Metoprolol given. Nitro gtt titrated down. Currently ST as 100's .

## 2020-02-07 NOTE — PROGRESS NOTES
[facility]    Cardiology Progress Note  [facility]    Jessica Primer Patient Status:  Inpatient    1955 MRN [id number]   Location [facility] 2W/SW Attending Michele Quintana MD   Hosp Day # 3 PCP [facility] Urine  1050  350  --    Urine 1050 -- --    Incontinent Urine Occurrence -- 1 x --    Output (mL) (External Urinary Catheter) -- 350 --    Stool  0  --  --    Stool 0 -- --    Total Output 1050 350 --       Net I/O     3688 6790 --          Vent Settings: 3.8 3.1*    106 110   CO2 18.0* 19.0* 19.0*     Recent Labs   Lab 02/05/20  0526 02/06/20  0441 02/07/20  0458   RBC 5.24 4.10 3.69*   HGB 15.3 12.4 11.0*   HCT 46.1 37.1 32.3*   MCV 88.0 90.5 87.5   MCH 29.2 30.2 29.8   MCHC 33.2 33.4 34.1   RDW 13. compared with ECG of 02/04/2020 21:46:48 ST elevation improved Electronically signed on 02/05/2020 at 21:30 by Poli Green DO      Exam:     Pulm: Lungs clear, normal respiratory effort  CV: Heart with regular rate and rhythm, no murmur, rub, extrasound

## 2020-02-07 NOTE — PROGRESS NOTES
Patient being transferred to fifth floor. Report called to RN.  notified of new room assignment. Belongings at bedside. VSS. Tele notified and confirmed placed on remote monitor. Double RN skin check done prior to transfer off unit.  Skin check

## 2020-02-07 NOTE — PROGRESS NOTES
[facility]    Progress Note    Ngozi Best Patient Status:  Inpatient    1955 MRN [id number]   Location [facility] 2W/SW Attending Susan Le MD   [facility] Day # 3 PCP Marion Arzate DO       Subjective:   Chito Luna -lateral and Old Inferior infarct.  ABNORMAL When compared with ECG of 02/06/2020 07:57:27 no significant change Electronically signed on 02/07/2020 at 12:53 by Az Argueta 12-lead    Result Date: 2/6/2020  ECG Report  Interpretation  -------------- unspecified artery ([address]  - status post left heart cath with drug eluting stent of the mid and proximal LAD  - continue on ticagrelor, lisinopril and asa   - heparin discontinued and a stat echo was ordered to rule out free wall rupture- negative    - cont

## 2020-02-07 NOTE — PLAN OF CARE
Patient screaming that she cannot breathe, patient tachypneic and struggling to breathe,wheezing. Sat's 89-90% on room air. Patient getting very anxious. RRT called,O2 applied 2lt/nc. MD's at bedside. Chest x-ray, EKG, troponin ordered.  IV lasix and IV sol

## 2020-02-07 NOTE — DIETARY NOTE
NUTRITION EDUCATION NOTE    Received consult for nutrition education per cardiac rehab order set. Pt busy with RN at time of visit.  Met with pt's  who is aware of diet guidelines but reports pt non-compliant with diet at home and not interested i

## 2020-02-08 LAB
ANION GAP SERPL CALC-SCNC: 10 MMOL/L (ref 0–18)
BASOPHILS # BLD AUTO: 0.02 X10(3) UL (ref 0–0.2)
BASOPHILS NFR BLD AUTO: 0.2 %
BUN BLD-MCNC: 22 MG/DL (ref 7–18)
BUN/CREAT SERPL: 26.2 (ref 10–20)
CALCIUM BLD-MCNC: 8.3 MG/DL (ref 8.5–10.1)
CHLORIDE SERPL-SCNC: 104 MMOL/L (ref 98–112)
CO2 SERPL-SCNC: 22 MMOL/L (ref 21–32)
CREAT BLD-MCNC: 0.84 MG/DL (ref 0.55–1.02)
DEPRECATED RDW RBC AUTO: 43.5 FL (ref 35.1–46.3)
EOSINOPHIL # BLD AUTO: 0.01 X10(3) UL (ref 0–0.7)
EOSINOPHIL NFR BLD AUTO: 0.1 %
ERYTHROCYTE [DISTWIDTH] IN BLOOD BY AUTOMATED COUNT: 13.7 % (ref 11–15)
GLUCOSE BLD-MCNC: 290 MG/DL (ref 70–99)
GLUCOSE BLDC GLUCOMTR-MCNC: 228 MG/DL (ref 70–99)
HCT VFR BLD AUTO: 38.5 % (ref 35–48)
HGB BLD-MCNC: 13.5 G/DL (ref 12–16)
IMM GRANULOCYTES # BLD AUTO: 0.06 X10(3) UL (ref 0–1)
IMM GRANULOCYTES NFR BLD: 0.6 %
LYMPHOCYTES # BLD AUTO: 0.27 X10(3) UL (ref 1–4)
LYMPHOCYTES NFR BLD AUTO: 2.8 %
MCH RBC QN AUTO: 30.3 PG (ref 26–34)
MCHC RBC AUTO-ENTMCNC: 35.1 G/DL (ref 31–37)
MCV RBC AUTO: 86.3 FL (ref 80–100)
MONOCYTES # BLD AUTO: 0.2 X10(3) UL (ref 0.1–1)
MONOCYTES NFR BLD AUTO: 2.1 %
NEUTROPHILS # BLD AUTO: 9.18 X10 (3) UL (ref 1.5–7.7)
NEUTROPHILS # BLD AUTO: 9.18 X10(3) UL (ref 1.5–7.7)
NEUTROPHILS NFR BLD AUTO: 94.2 %
OSMOLALITY SERPL CALC.SUM OF ELEC: 296 MOSM/KG (ref 275–295)
PLATELET # BLD AUTO: 231 10(3)UL (ref 150–450)
POTASSIUM SERPL-SCNC: 3.2 MMOL/L (ref 3.5–5.1)
POTASSIUM SERPL-SCNC: 3.8 MMOL/L (ref 3.5–5.1)
RBC # BLD AUTO: 4.46 X10(6)UL (ref 3.8–5.3)
SODIUM SERPL-SCNC: 136 MMOL/L (ref 136–145)
WBC # BLD AUTO: 9.7 X10(3) UL (ref 4–11)

## 2020-02-08 PROCEDURE — 99233 SBSQ HOSP IP/OBS HIGH 50: CPT | Performed by: HOSPITALIST

## 2020-02-08 RX ORDER — SPIRONOLACTONE 25 MG/1
12.5 TABLET ORAL DAILY
Status: DISCONTINUED | OUTPATIENT
Start: 2020-02-08 | End: 2020-02-11

## 2020-02-08 RX ORDER — POTASSIUM CHLORIDE 1.5 G/1.77G
40 POWDER, FOR SOLUTION ORAL EVERY 4 HOURS
Status: COMPLETED | OUTPATIENT
Start: 2020-02-08 | End: 2020-02-08

## 2020-02-08 NOTE — DIABETES ED
[facility]OSP - [facility]    Diabetes Education  Note    Eduardoparish Elder Patient Status:  Inpatient   1955 MRN [id number]  Location [facility]5W Attending Yonatan Kelley MD  Hosp Day # 4 PCP Tino Arteaga DO    Reason for Visit: Manasa Decker

## 2020-02-08 NOTE — PROGRESS NOTES
[facility]    Progress Note    Meeksandra Hussein Patient Status:  Inpatient    1955 MRN [id number]   Location [facility] 2W/SW Attending Kathrin Subramanian MD   [facility] Day # 4 PCP Jennifer Diaz DO       Subjective:   Cony Seen 12-lead    Result Date: 2/7/2020  ECG Report  Interpretation  -------------------------- sinus tachycardia shot WA Diffuse low voltage. - (age undetermined) Anterior -lateral and Old Inferior infarct.  ABNORMAL When compared with ECG of 02/06/2020 07:57:27 lexapro 5 mg qdaily   abilify 1 mg qhs       Quality:  · DVT Prophylaxis: SCDs- heparin held   · CODE status: Full     >35 min spent with patient. > 50% time was spent counseling patient, discussing plan of care, discussing labs and imaging findings.  Spoke

## 2020-02-08 NOTE — PLAN OF CARE
Problem: Patient Centered Care  Goal: Patient preferences are identified and integrated in the patient's plan of care  Description  Interventions:  - What would you like us to know as we care for you?   - Provide timely, complete, and accurate informatio artery perfusion - ex.  Angina  - Evaluate fluid balance, assess for edema, trend weights  Outcome: Progressing     Problem: RESPIRATORY - ADULT  Goal: Achieves optimal ventilation and oxygenation  Description  INTERVENTIONS:  - Assess for changes in respir contributing to decreased intake, treat as appropriate  - Assist with meals as needed  - Monitor I&O, WT and lab values  - Obtain nutritional consult as needed  - Optimize oral hygiene and moisture  - Encourage food from home; allow for food preferences  -

## 2020-02-08 NOTE — PROGRESS NOTES
[facility] - [facility]    Cardiology Progress Note    Melecio Moreno Patient Status:  Inpatient    1955 MRN [id number]   Location [facility]5W Attending Soraya Bucio MD   Hosp Day # 4 PCP Ronak Anthony DO     Primary Cardiologist - recurrent chest pain on 2/6 - stat ECHO with no acute findings and normal sed rate  - cardiac rehab and dietician following    Ischemic Cardiomyopathy  - ECHO with EF 30-35%  - RRT 2/7 for worsening shortness of breath.   CXR with pulmonary congestion- -lateral and Old Inferior infarct.  ABNORMAL When compared with ECG of 02/06/2020 07:57:27 no significant change Electronically signed on 02/07/2020 at 12:53 by SANDRO Seaman  S Cardiology  02/08/20

## 2020-02-09 LAB
ANION GAP SERPL CALC-SCNC: 10 MMOL/L (ref 0–18)
BASOPHILS # BLD AUTO: 0.02 X10(3) UL (ref 0–0.2)
BASOPHILS NFR BLD AUTO: 0.2 %
BUN BLD-MCNC: 33 MG/DL (ref 7–18)
BUN/CREAT SERPL: 37.5 (ref 10–20)
CALCIUM BLD-MCNC: 8.4 MG/DL (ref 8.5–10.1)
CHLORIDE SERPL-SCNC: 108 MMOL/L (ref 98–112)
CO2 SERPL-SCNC: 20 MMOL/L (ref 21–32)
CREAT BLD-MCNC: 0.88 MG/DL (ref 0.55–1.02)
DEPRECATED RDW RBC AUTO: 43.7 FL (ref 35.1–46.3)
EOSINOPHIL # BLD AUTO: 0 X10(3) UL (ref 0–0.7)
EOSINOPHIL NFR BLD AUTO: 0 %
ERYTHROCYTE [DISTWIDTH] IN BLOOD BY AUTOMATED COUNT: 13.9 % (ref 11–15)
GLUCOSE BLD-MCNC: 282 MG/DL (ref 70–99)
GLUCOSE BLDC GLUCOMTR-MCNC: 143 MG/DL (ref 70–99)
GLUCOSE BLDC GLUCOMTR-MCNC: 146 MG/DL (ref 70–99)
GLUCOSE BLDC GLUCOMTR-MCNC: 192 MG/DL (ref 70–99)
GLUCOSE BLDC GLUCOMTR-MCNC: 193 MG/DL (ref 70–99)
GLUCOSE BLDC GLUCOMTR-MCNC: 196 MG/DL (ref 70–99)
GLUCOSE BLDC GLUCOMTR-MCNC: 242 MG/DL (ref 70–99)
GLUCOSE BLDC GLUCOMTR-MCNC: 243 MG/DL (ref 70–99)
GLUCOSE BLDC GLUCOMTR-MCNC: 277 MG/DL (ref 70–99)
HCT VFR BLD AUTO: 36.6 % (ref 35–48)
HGB BLD-MCNC: 12.2 G/DL (ref 12–16)
IMM GRANULOCYTES # BLD AUTO: 0.12 X10(3) UL (ref 0–1)
IMM GRANULOCYTES NFR BLD: 0.9 %
LYMPHOCYTES # BLD AUTO: 0.23 X10(3) UL (ref 1–4)
LYMPHOCYTES NFR BLD AUTO: 1.7 %
MCH RBC QN AUTO: 28.9 PG (ref 26–34)
MCHC RBC AUTO-ENTMCNC: 33.3 G/DL (ref 31–37)
MCV RBC AUTO: 86.7 FL (ref 80–100)
MONOCYTES # BLD AUTO: 0.56 X10(3) UL (ref 0.1–1)
MONOCYTES NFR BLD AUTO: 4.2 %
NEUTROPHILS # BLD AUTO: 12.33 X10 (3) UL (ref 1.5–7.7)
NEUTROPHILS # BLD AUTO: 12.33 X10(3) UL (ref 1.5–7.7)
NEUTROPHILS NFR BLD AUTO: 93 %
OSMOLALITY SERPL CALC.SUM OF ELEC: 303 MOSM/KG (ref 275–295)
PLATELET # BLD AUTO: 247 10(3)UL (ref 150–450)
POTASSIUM SERPL-SCNC: 3.9 MMOL/L (ref 3.5–5.1)
RBC # BLD AUTO: 4.22 X10(6)UL (ref 3.8–5.3)
SODIUM SERPL-SCNC: 138 MMOL/L (ref 136–145)
WBC # BLD AUTO: 13.3 X10(3) UL (ref 4–11)

## 2020-02-09 PROCEDURE — 99233 SBSQ HOSP IP/OBS HIGH 50: CPT | Performed by: HOSPITALIST

## 2020-02-09 RX ORDER — IPRATROPIUM BROMIDE AND ALBUTEROL SULFATE 2.5; .5 MG/3ML; MG/3ML
3 SOLUTION RESPIRATORY (INHALATION)
Status: DISCONTINUED | OUTPATIENT
Start: 2020-02-09 | End: 2020-02-11

## 2020-02-09 NOTE — PROGRESS NOTES
[facility] - [facility]    Cardiology Progress Note    Francesco Ode Patient Status:  Inpatient    1955 MRN [id number]   Location [facility]5W Attending Seth Bhatia MD   Hosp Day # 5 PCP [place] Nena Schuler DO     Primary Cardiologist following     Ischemic Cardiomyopathy  - ECHO with EF 30-35%  - RRT 2/7 for worsening shortness of breath. CXR with pulmonary congestion- improved with IV lasix total of 80 mg IV.   - weight down today with good diuresis  - FR and reduced NA diet  - would recent) Extensive anterior-lateral and Old Inferior infarct.  ABNORMAL When compared with ECG of 02/07/2020 05:11:18 No significant changes have occurred Electronically signed on 02/08/2020 at 17:33 by SANDRO Barajas Ca

## 2020-02-09 NOTE — PLAN OF CARE
Problem: Patient Centered Care  Goal: Patient preferences are identified and integrated in the patient's plan of care  Description  Interventions:  - What would you like us to know as we care for you?  Home with   - Provide timely, complete, and ac vasoactive medications to optimize hemodynamic stability  - Monitor arterial and/or venous puncture sites for bleeding and/or hematoma  - Assess quality of pulses, skin color and temperature  - Assess for signs of decreased coronary artery perfusion - ex. closely for changes.

## 2020-02-09 NOTE — PROGRESS NOTES
[facility]    Progress Note    Eduardo Elder Patient Status:  Inpatient    1955 MRN [id number]   Location [facility] 2W/SW Attending Yonatan Kelley MD   [facility] Day # 5 PCP Tino Arteaga DO       Subjective:   Dwayne Ayala Daily   • atorvastatin  80 mg Oral Nightly   • Metoprolol Succinate ER  50 mg Oral Daily Beta Blocker   • MethylPREDNISolone Sodium Succ  40 mg Intravenous Q6H   • ARIPiprazole  2 mg Oral Nightly   • lisinopril  2.5 mg Oral Daily   • Pantoprazole Sodium  4 MD  02/09/20

## 2020-02-09 NOTE — PLAN OF CARE
Problem: Patient Centered Care  Goal: Patient preferences are identified and integrated in the patient's plan of care  Description  Interventions:  - What would you like us to know as we care for you?  Home with   - Provide timely, complete, and ac vasoactive medications to optimize hemodynamic stability  - Monitor arterial and/or venous puncture sites for bleeding and/or hematoma  - Assess quality of pulses, skin color and temperature  - Assess for signs of decreased coronary artery perfusion - ex.

## 2020-02-09 NOTE — PLAN OF CARE
No c/o chest pain. Right groin site stable. Dressing CDI. Purewick changed. . Will continue to monitor.      Problem: Diabetes/Glucose Control  Goal: Glucose maintained within prescribed range  Description  INTERVENTIONS:  - Monitor Blood Glucose as o and trends  - Monitor for bleeding, hypotension and signs of decreased cardiac output  - Evaluate effectiveness of vasoactive medications to optimize hemodynamic stability  - Monitor arterial and/or venous puncture sites for bleeding and/or hematoma  - Ass activity  - Obtain nutritional consult as needed  - Establish a toileting routine/schedule  - Consider collaborating with pharmacy to review patient's medication profile  Outcome: Progressing  Goal: Maintains adequate nutritional intake (undernourished)  D

## 2020-02-10 LAB
ANION GAP SERPL CALC-SCNC: 11 MMOL/L (ref 0–18)
ANION GAP SERPL CALC-SCNC: 8 MMOL/L (ref 0–18)
BASOPHILS # BLD AUTO: 0.01 X10(3) UL (ref 0–0.2)
BASOPHILS NFR BLD AUTO: 0.1 %
BUN BLD-MCNC: 35 MG/DL (ref 7–18)
BUN BLD-MCNC: 35 MG/DL (ref 7–18)
BUN/CREAT SERPL: 31 (ref 10–20)
BUN/CREAT SERPL: 36.8 (ref 10–20)
CALCIUM BLD-MCNC: 8.3 MG/DL (ref 8.5–10.1)
CALCIUM BLD-MCNC: 8.7 MG/DL (ref 8.5–10.1)
CHLORIDE SERPL-SCNC: 109 MMOL/L (ref 98–112)
CHLORIDE SERPL-SCNC: 110 MMOL/L (ref 98–112)
CO2 SERPL-SCNC: 20 MMOL/L (ref 21–32)
CO2 SERPL-SCNC: 24 MMOL/L (ref 21–32)
CREAT BLD-MCNC: 0.95 MG/DL (ref 0.55–1.02)
CREAT BLD-MCNC: 1.13 MG/DL (ref 0.55–1.02)
DEPRECATED RDW RBC AUTO: 45.6 FL (ref 35.1–46.3)
EOSINOPHIL # BLD AUTO: 0 X10(3) UL (ref 0–0.7)
EOSINOPHIL NFR BLD AUTO: 0 %
ERYTHROCYTE [DISTWIDTH] IN BLOOD BY AUTOMATED COUNT: 14.2 % (ref 11–15)
GLUCOSE BLD-MCNC: 195 MG/DL (ref 70–99)
GLUCOSE BLD-MCNC: 66 MG/DL (ref 70–99)
GLUCOSE BLDC GLUCOMTR-MCNC: 159 MG/DL (ref 70–99)
GLUCOSE BLDC GLUCOMTR-MCNC: 198 MG/DL (ref 70–99)
GLUCOSE BLDC GLUCOMTR-MCNC: 199 MG/DL (ref 70–99)
HCT VFR BLD AUTO: 37 % (ref 35–48)
HGB BLD-MCNC: 12.4 G/DL (ref 12–16)
IMM GRANULOCYTES # BLD AUTO: 0.07 X10(3) UL (ref 0–1)
IMM GRANULOCYTES NFR BLD: 0.6 %
LYMPHOCYTES # BLD AUTO: 0.21 X10(3) UL (ref 1–4)
LYMPHOCYTES NFR BLD AUTO: 1.9 %
MCH RBC QN AUTO: 29.7 PG (ref 26–34)
MCHC RBC AUTO-ENTMCNC: 33.5 G/DL (ref 31–37)
MCV RBC AUTO: 88.7 FL (ref 80–100)
MONOCYTES # BLD AUTO: 0.42 X10(3) UL (ref 0.1–1)
MONOCYTES NFR BLD AUTO: 3.9 %
NEUTROPHILS # BLD AUTO: 10.13 X10 (3) UL (ref 1.5–7.7)
NEUTROPHILS # BLD AUTO: 10.13 X10(3) UL (ref 1.5–7.7)
NEUTROPHILS NFR BLD AUTO: 93.5 %
OSMOLALITY SERPL CALC.SUM OF ELEC: 300 MOSM/KG (ref 275–295)
OSMOLALITY SERPL CALC.SUM OF ELEC: 303 MOSM/KG (ref 275–295)
PLATELET # BLD AUTO: 270 10(3)UL (ref 150–450)
POTASSIUM SERPL-SCNC: 3.5 MMOL/L (ref 3.5–5.1)
POTASSIUM SERPL-SCNC: 3.9 MMOL/L (ref 3.5–5.1)
RBC # BLD AUTO: 4.17 X10(6)UL (ref 3.8–5.3)
SODIUM SERPL-SCNC: 140 MMOL/L (ref 136–145)
SODIUM SERPL-SCNC: 142 MMOL/L (ref 136–145)
WBC # BLD AUTO: 10.8 X10(3) UL (ref 4–11)

## 2020-02-10 PROCEDURE — 99222 1ST HOSP IP/OBS MODERATE 55: CPT | Performed by: INTERNAL MEDICINE

## 2020-02-10 PROCEDURE — 99233 SBSQ HOSP IP/OBS HIGH 50: CPT | Performed by: HOSPITALIST

## 2020-02-10 RX ORDER — BLOOD SUGAR DIAGNOSTIC
STRIP MISCELLANEOUS
Qty: 40 STRIP | Refills: 0 | Status: SHIPPED | OUTPATIENT
Start: 2020-02-10 | End: 2020-11-02

## 2020-02-10 RX ORDER — ESCITALOPRAM OXALATE 5 MG/1
5 TABLET ORAL NIGHTLY
Qty: 30 TABLET | Refills: 0 | Status: SHIPPED | OUTPATIENT
Start: 2020-02-10

## 2020-02-10 RX ORDER — METOPROLOL SUCCINATE 50 MG/1
50 TABLET, EXTENDED RELEASE ORAL
Qty: 30 TABLET | Refills: 3 | Status: SHIPPED | OUTPATIENT
Start: 2020-02-11

## 2020-02-10 RX ORDER — ASPIRIN 81 MG/1
81 TABLET ORAL DAILY
Qty: 30 TABLET | Refills: 3 | Status: SHIPPED | OUTPATIENT
Start: 2020-02-11

## 2020-02-10 RX ORDER — LISINOPRIL 2.5 MG/1
2.5 TABLET ORAL DAILY
Qty: 30 TABLET | Refills: 3 | Status: SHIPPED | OUTPATIENT
Start: 2020-02-11 | End: 2020-04-09

## 2020-02-10 RX ORDER — BLOOD-GLUCOSE METER
KIT MISCELLANEOUS
Qty: 1 KIT | Refills: 0 | Status: SHIPPED | OUTPATIENT
Start: 2020-02-10

## 2020-02-10 RX ORDER — SPIRONOLACTONE 25 MG/1
12.5 TABLET ORAL DAILY
Qty: 15 TABLET | Refills: 3 | Status: SHIPPED | OUTPATIENT
Start: 2020-02-11 | End: 2020-07-10

## 2020-02-10 RX ORDER — ARIPIPRAZOLE 2 MG/1
2 TABLET ORAL NIGHTLY
Qty: 30 TABLET | Refills: 0 | Status: SHIPPED | OUTPATIENT
Start: 2020-02-10

## 2020-02-10 RX ORDER — ATORVASTATIN CALCIUM 80 MG/1
80 TABLET, FILM COATED ORAL NIGHTLY
Qty: 30 TABLET | Refills: 3 | Status: SHIPPED | OUTPATIENT
Start: 2020-02-10

## 2020-02-10 NOTE — DISCHARGE SUMMARY
[facility]    Discharge Summary    Lovely Chatterjee Patient Status:  Inpatient    1955 MRN [id number]   Location [facility]5W Attending Aurea Medel MD   [facility] Day [facility] PCP Ward Mendoza DO     Date of Admission: 2020 and atorvastatin and metoprolol  - EF 30-35 % hypokinesis   - encouraged patient on the importance of compliance with medicine  - dietician following  - will need cardiac rehab at discharge    - Life vest at discharge     Leukocytosis  resolved     Hypokal Pen-injector  Inject 5 Units into the skin 3 (three) times daily with meals. insulin detemir 100 UNIT/ML Subcutaneous Solution Pen-injector  Inject 15 Units into the skin daily.       Home Meds - Modified    atorvastatin 80 MG Oral Tab  Take 1 tablet (80 CHANGE how you take these medications      Instructions Prescription details   atorvastatin 80 MG Tabs  Commonly known as:  Lipitor  What changed:    · medication strength  · how much to take      Take 1 tablet (80 mg total) by mouth nightly.    Quantity:

## 2020-02-10 NOTE — PLAN OF CARE
Improved blood sugar; <200. No c/o pain or SOB. Continue IV steroids. Right cath site ADAM. Ambulated in nunez. Gait slow and steady.      Problem: Patient Centered Care  Goal: Patient preferences are identified and integrated in the patient's plan of care  D vital signs, rhythm, and trends  - Monitor for bleeding, hypotension and signs of decreased cardiac output  - Evaluate effectiveness of vasoactive medications to optimize hemodynamic stability  - Monitor arterial and/or venous puncture sites for bleeding a Encourage mobilization and activity  - Obtain nutritional consult as needed  - Establish a toileting routine/schedule  - Consider collaborating with pharmacy to review patient's medication profile  Outcome: Progressing  Goal: Maintains adequate nutritional

## 2020-02-10 NOTE — PROGRESS NOTES
[facility] - [facility]    Progress Note    Ngozi Best Patient Status:  Inpatient    1955 MRN [id number]   Location [facility] 2W/SW Attending Susan Le MD   [facility] Day # 6 PCP Marion Arzate DO       Subjective:   Chito Luna Units Subcutaneous TID CC   • escitalopram  5 mg Oral Nightly   • ticagrelor  90 mg Oral Q12H   • aspirin  81 mg Oral Daily         Assessment and Plan:     ST elevation myocardial infarction (STEMI), unspecified artery ([facility] [address]  - status post left heart cath

## 2020-02-10 NOTE — PROGRESS NOTES
[facility] - [facility]    Cardiology Progress Note    Michelle Kaufmanignacio Patient Status:  Inpatient    1955 MRN [id number]   Location [facility]5W Attending Rocio Clayton MD   Hosp Day # 6 PCP Antoine Ruiz DO     Primary Cardiologist following     Ischemic Cardiomyopathy  - ECHO with EF 30-35%  - RRT 2/7 for worsening shortness of breath.  CXR with pulmonary congestion- improved with IV lasix total of 80 mg IV.   - weight down today with good diuresis  - FR and reduced NA diet  - cont l

## 2020-02-10 NOTE — CONSULTS
[facility]    Report of Consultation    Nino Coles Patient Status:  Inpatient    1955 MRN [id number]   Location [facility]5 [facility] Attending Victorino Melendez MD   Hosp Day # 6 PCP Anatoly Pfeiffer DO     Date of Admission: injection 2.5 mg, 2.5 mg, Intravenous, Q4H PRN  •  lisinopril tab 2.5 mg, 2.5 mg, Oral, Daily  •  Pantoprazole Sodium (PROTONIX) EC tab 40 mg, 40 mg, Oral, QAM AC  •  glucose (DEX4) oral liquid 15 g, 15 g, Oral, Q15 Min PRN **OR** Glucose-Vitamin C (DEX-4) orientated x3. Cooperative. No apparent distress. HEENT: Exam is unremarkable. Neck: Supple. Lungs: Clear bilaterally. Cardiac: Regular rate and rhythm. Abdomen: soft  Extremities:  No lower extremity edema noted. Skin: Normal texture and turgor.

## 2020-02-11 ENCOUNTER — TELEPHONE (OUTPATIENT)
Dept: ENDOCRINOLOGY CLINIC | Facility: CLINIC | Age: 65
End: 2020-02-11

## 2020-02-11 VITALS
HEIGHT: 60 IN | WEIGHT: 116.31 LBS | HEART RATE: 70 BPM | RESPIRATION RATE: 18 BRPM | OXYGEN SATURATION: 96 % | SYSTOLIC BLOOD PRESSURE: 115 MMHG | BODY MASS INDEX: 22.84 KG/M2 | DIASTOLIC BLOOD PRESSURE: 73 MMHG | TEMPERATURE: 98 F

## 2020-02-11 LAB
GLUCOSE BLDC GLUCOMTR-MCNC: 122 MG/DL (ref 70–99)
GLUCOSE BLDC GLUCOMTR-MCNC: 79 MG/DL (ref 70–99)
GLUCOSE BLDC GLUCOMTR-MCNC: 86 MG/DL (ref 70–99)
GLUCOSE BLDC GLUCOMTR-MCNC: 87 MG/DL (ref 70–99)

## 2020-02-11 PROCEDURE — 99232 SBSQ HOSP IP/OBS MODERATE 35: CPT | Performed by: INTERNAL MEDICINE

## 2020-02-11 PROCEDURE — 99239 HOSP IP/OBS DSCHRG MGMT >30: CPT | Performed by: HOSPITALIST

## 2020-02-11 RX ORDER — PEN NEEDLE, DIABETIC 30 GX3/16"
1 NEEDLE, DISPOSABLE MISCELLANEOUS DAILY
Qty: 100 EACH | Refills: 0 | Status: SHIPPED | OUTPATIENT
Start: 2020-02-11 | End: 2020-04-01

## 2020-02-11 RX ORDER — LANCETS
EACH MISCELLANEOUS
Qty: 200 EACH | Refills: 0 | Status: SHIPPED | OUTPATIENT
Start: 2020-02-11 | End: 2020-11-02

## 2020-02-11 RX ORDER — BLOOD-GLUCOSE METER
1 EACH MISCELLANEOUS ONCE
Qty: 1 KIT | Refills: 0 | Status: SHIPPED | OUTPATIENT
Start: 2020-02-11 | End: 2020-02-11

## 2020-02-11 RX ORDER — METFORMIN HYDROCHLORIDE 500 MG/1
500 TABLET, EXTENDED RELEASE ORAL 2 TIMES DAILY WITH MEALS
Qty: 180 TABLET | Refills: 0 | Status: SHIPPED | OUTPATIENT
Start: 2020-02-11 | End: 2020-04-09

## 2020-02-11 NOTE — PLAN OF CARE
Alert and oriented. Lab called with a glucose of 66. Blood side glucose 83. Pt given Kris hillman. Will continue to monitor.        Problem: Patient Centered Care  Goal: Patient preferences are identified and integrated in the patient's plan of care  Roney stability  Description  INTERVENTIONS:  - Monitor vital signs, rhythm, and trends  - Monitor for bleeding, hypotension and signs of decreased cardiac output  - Evaluate effectiveness of vasoactive medications to optimize hemodynamic stability  - Monitor ar Evaluate effectiveness of GI medications  - Encourage mobilization and activity  - Obtain nutritional consult as needed  - Establish a toileting routine/schedule  - Consider collaborating with pharmacy to review patient's medication profile  Outcome: Progr

## 2020-02-11 NOTE — TELEPHONE ENCOUNTER
Contacted Noemí Vidal. Offered to schedule hospital follow-up with LACEY Anaya the week of Feb 24. She requests that RN contact her  to make appointment. Called Judith Hanson - no answer, no voicemail x 2. Will try again another time.  Noemí Vidal will have

## 2020-02-11 NOTE — PROGRESS NOTES
[facility]    Progress Note    Kajal Cheng Patient Status:  Inpatient    1955 MRN [id number]   Location [address] Attending Mann Smith MD   Hosp Day # 7 PCP Gabby Juan DO     Subjective:  Feels okay in two weeks.        Jesusita Homans

## 2020-02-11 NOTE — DISCHARGE SUMMARY
[facility]    Discharge Summary    Kunal Sharpe Patient Status:  Inpatient    1955 MRN [id number]   Location [facility]5W Attending Halie Hardy MD   [facility] Day # 7 PCP David Marin DO     Date of Admission: 2020 and atorvastatin and metoprolol  - EF 30-35 % hypokinesis   - encouraged patient on the importance of compliance with medicine  - dietician following  - will need cardiac rehab at discharge    - Life vest at discharge   - social work consulted for home rob tablet (5 mg total) by mouth nightly. Insulin Aspart Pen 100 UNIT/ML Subcutaneous Solution Pen-injector  Inject 5 Units into the skin 3 (three) times daily with meals.     insulin detemir 100 UNIT/ML Subcutaneous Solution Pen-injector  Inject 15 Units in daily.   Quantity:  100 each  Refills:  0     spironolactone 25 MG Tabs  Commonly known as:  ALDACTONE      Take 0.5 tablets (12.5 mg total) by mouth daily.    Quantity:  15 tablet  Refills:  3     ticagrelor 90 MG Tabs  Commonly known as:  BRILINTA      Ta  MG Tb24  · Metoprolol Succinate ER 50 MG Tb24  · Microlet Lancets Misc  · Pen Needles 32G X 4 MM Misc  · spironolactone 25 MG Tabs  · ticagrelor 90 MG Tabs     Please  your prescriptions at the location directed by your doctor or nurse    Ashley

## 2020-02-11 NOTE — PLAN OF CARE
Discharge instructions and Follow up plan reviewed. Problem: Patient Centered Care  Goal: Patient preferences are identified and integrated in the patient's plan of care  Description  Interventions:  - What would you like us to know as we care for you?  Gayla Leiva testing  - take all meds as ordered  - monitor I/O  - monitor salt intake  - See additional Care Plan goals for specific interventions   2/11/2020 1122 by Sheri Patterson RN  Outcome: Adequate for Discharge  2/11/2020 1045 by Sheri Patterson, JERRY Khan vomiting  Description  INTERVENTIONS:  - Maintain adequate hydration with IV or PO as ordered and tolerated  - Nasogastric tube to low intermittent suction as ordered  - Evaluate effectiveness of ordered antiemetic medications  - Provide nonpharmacologic c psychosocial factors contributing to over-consumption  2/11/2020 1122 by Arik Torrez, RN  Outcome: Adequate for Discharge  2/11/2020 1045 by Arik Torrez, RN  Outcome: Progressing     Problem: SAFETY ADULT - FALL  Goal: Free from fall injury  Heather Barnett

## 2020-02-11 NOTE — HOME CARE LIAISON
Received referral from [address] [place] [address]. Met with patient at the bedside and provided choice. Patient is agreeable to Critical access hospital. Residential brochure provided with contact information. All questions addressed and answered.      Explained to

## 2020-02-11 NOTE — DIABETES ED
[facility]OSP - [facility]    Diabetes Education  Note    Valentín Claudia Patient Status:  Inpatient   1955 MRN [id number]  Location [facility]5W Attending Maria Luisa Rico MD  Hosp Day # 7 PCP Sal Alva DO    Reason for Visit:  [place]

## 2020-02-11 NOTE — TELEPHONE ENCOUNTER
Discharge orders discussed with Dr. Joel Ruth, new orders placed and send to [facility].     Tucker Zavala was notified that patient needs to be seen prior to discharge from the hospital with special instructions to teach patient: nathaliel

## 2020-02-11 NOTE — PLAN OF CARE
Dom Werner is feeling \"well\" this morning. Denies complaints. , Denise Worthy, at bedside. Reviewing diabetic follow up plan, wound care and heart failure follow up plan. Coordinating and scheduling follow up appointments.  Plan for discharge to home later t Outcome: Progressing     Problem: CARDIOVASCULAR - ADULT  Goal: Maintains optimal cardiac output and hemodynamic stability  Description  INTERVENTIONS:  - Monitor vital signs, rhythm, and trends  - Monitor for bleeding, hypotension and signs of decreased function  Description  INTERVENTIONS:  - Assess bowel function  - Maintain adequate hydration with IV or PO as ordered and tolerated  - Evaluate effectiveness of GI medications  - Encourage mobilization and activity  - Obtain nutritional consult as needed

## 2020-02-11 NOTE — PROGRESS NOTES
[facility] - [facility]    Cardiology Progress Note    Jeancarlos Hussein Patient Status:  Inpatient    1955 MRN [id number]   Location [facility]5W Attending Kathrin Subramanian MD   Hosp Day # 7 PCP Jennifer Diaz DO     Primary Cardiologist cardiac rehab and dietician following     Ischemic Cardiomyopathy  - ECHO with EF 30-35%  - RRT 2/7 for worsening shortness of breath.  CXR with pulmonary congestion- improved with IV lasix total of 80 mg IV.   - weight stable  - FR and reduced NA diet  - c

## 2020-02-11 NOTE — CM/SW NOTE
SW received MDO for advanced directives. SW met with pt at bedside to discuss completion of forms.  Pt states she does not want to complete these forms and she had had them done in the past. SW encouraged pt/family to bring copy to [facility] for hospital records

## 2020-02-12 ENCOUNTER — PATIENT OUTREACH (OUTPATIENT)
Dept: CASE MANAGEMENT | Age: 65
End: 2020-02-12

## 2020-02-12 DIAGNOSIS — Z02.9 ENCOUNTERS FOR UNSPECIFIED ADMINISTRATIVE PURPOSE: ICD-10-CM

## 2020-02-12 NOTE — PROGRESS NOTES
NCM attempted to contact the patient for the 2nd time for hospital follow up. No answer and no option to leave a message. NCM will try again later.

## 2020-02-12 NOTE — PROGRESS NOTES
NCM attempted to contact the patient for HFU. No answer after about a minute of ringing. NCM will try again.

## 2020-02-13 ENCOUNTER — TELEPHONE (OUTPATIENT)
Dept: INTERNAL MEDICINE CLINIC | Facility: CLINIC | Age: 65
End: 2020-02-13

## 2020-02-13 ENCOUNTER — OFFICE VISIT (OUTPATIENT)
Dept: FAMILY MEDICINE CLINIC | Facility: CLINIC | Age: 65
End: 2020-02-13
Payer: MEDICARE

## 2020-02-13 ENCOUNTER — TELEPHONE (OUTPATIENT)
Dept: ENDOCRINOLOGY CLINIC | Facility: CLINIC | Age: 65
End: 2020-02-13

## 2020-02-13 VITALS
WEIGHT: 116 LBS | HEIGHT: 60 IN | HEART RATE: 65 BPM | BODY MASS INDEX: 22.78 KG/M2 | TEMPERATURE: 97 F | DIASTOLIC BLOOD PRESSURE: 57 MMHG | SYSTOLIC BLOOD PRESSURE: 89 MMHG

## 2020-02-13 DIAGNOSIS — I21.3 ST ELEVATION MYOCARDIAL INFARCTION (STEMI), UNSPECIFIED ARTERY (HCC): Primary | ICD-10-CM

## 2020-02-13 DIAGNOSIS — F31.4 SEVERE BIPOLAR I DISORDER, MOST RECENT EPISODE DEPRESSED (HCC): ICD-10-CM

## 2020-02-13 DIAGNOSIS — I25.5 ISCHEMIC CARDIOMYOPATHY: ICD-10-CM

## 2020-02-13 DIAGNOSIS — Z79.4 TYPE 2 DIABETES MELLITUS WITHOUT COMPLICATION, WITH LONG-TERM CURRENT USE OF INSULIN (HCC): ICD-10-CM

## 2020-02-13 DIAGNOSIS — F41.1 GENERALIZED ANXIETY DISORDER: ICD-10-CM

## 2020-02-13 DIAGNOSIS — E11.9 TYPE 2 DIABETES MELLITUS WITHOUT COMPLICATION, WITH LONG-TERM CURRENT USE OF INSULIN (HCC): ICD-10-CM

## 2020-02-13 DIAGNOSIS — E78.00 PURE HYPERCHOLESTEROLEMIA: ICD-10-CM

## 2020-02-13 PROBLEM — E11.65 UNCONTROLLED TYPE 2 DIABETES MELLITUS (HCC): Status: ACTIVE | Noted: 2020-02-13

## 2020-02-13 PROBLEM — IMO0002 UNCONTROLLED TYPE 2 DIABETES MELLITUS: Status: ACTIVE | Noted: 2020-02-13

## 2020-02-13 LAB
GLUCOSE BLOOD: 171
TEST STRIP LOT #: NORMAL NUMERIC

## 2020-02-13 PROCEDURE — 36416 COLLJ CAPILLARY BLOOD SPEC: CPT | Performed by: FAMILY MEDICINE

## 2020-02-13 PROCEDURE — 99496 TRANSJ CARE MGMT HIGH F2F 7D: CPT | Performed by: FAMILY MEDICINE

## 2020-02-13 PROCEDURE — 82962 GLUCOSE BLOOD TEST: CPT | Performed by: FAMILY MEDICINE

## 2020-02-13 PROCEDURE — 1111F DSCHRG MED/CURRENT MED MERGE: CPT | Performed by: FAMILY MEDICINE

## 2020-02-13 NOTE — TELEPHONE ENCOUNTER
Received call from [facility] Patient showed up at their office, states she does not know how to inject insulin. Booked CDE appt today. Barbra Gonzáles (supervisor) will call back if for some reason patient can't make appointment.

## 2020-02-13 NOTE — TELEPHONE ENCOUNTER
Yung Stevens called back. States patient is on her way to pharmacy to  insulin and get instruction. Did ask that they call our office right away with any questions and to schedule hospital follow up.

## 2020-02-13 NOTE — TELEPHONE ENCOUNTER
Patient showed up for 3:15 pm appointment with  at 12:20 pm to see Dr. Adriaan Harris, due to confusion of appointment time. Reportedly patient's  inquired for someone to administer patient her insulin.  After chart review, patient was found to just

## 2020-02-13 NOTE — TELEPHONE ENCOUNTER
Spoke with Sander de la rosa ([phone]). Asking for appointment this week with due to confusion regarding insulin administration. Booked tomorrow with LACEY [address] [place] [address] Ryansahil.

## 2020-02-13 NOTE — PROGRESS NOTES
HPI:    Radha Garcia is a 72year old female here today for hospital follow up.    She was discharged from Inpatient hospital, [facility]  to Home   Admission Date: 2/4/20   Discharge Date: 2/11/20  Hospital Discharge Diagnoses (since 1/14/2020 tablet (80 mg total) by mouth nightly. aspirin 81 MG Oral Tab EC, Take 1 tablet (81 mg total) by mouth daily. lisinopril 2.5 MG Oral Tab, Take 1 tablet (2.5 mg total) by mouth daily.   Metoprolol Succinate ER 50 MG Oral Tablet 24 Hr, Take 1 tablet (50 mg Wt 116 lb (52.6 kg)   BMI 22.65 kg/m²   Physical Exam    Constitutional: She appears well-developed. Cardiovascular: Normal rate and regular rhythm. Wearing life vest   Pulmonary/Chest: Effort normal and breath sounds normal.   Abdominal: Soft.    Pelv

## 2020-02-14 ENCOUNTER — OFFICE VISIT (OUTPATIENT)
Dept: ENDOCRINOLOGY CLINIC | Facility: CLINIC | Age: 65
End: 2020-02-14
Payer: MEDICARE

## 2020-02-14 ENCOUNTER — TELEPHONE (OUTPATIENT)
Dept: CARDIOLOGY UNIT | Facility: HOSPITAL | Age: 65
End: 2020-02-14

## 2020-02-14 ENCOUNTER — TELEPHONE (OUTPATIENT)
Dept: FAMILY MEDICINE CLINIC | Facility: CLINIC | Age: 65
End: 2020-02-14

## 2020-02-14 ENCOUNTER — TELEPHONE (OUTPATIENT)
Dept: ENDOCRINOLOGY CLINIC | Facility: CLINIC | Age: 65
End: 2020-02-14

## 2020-02-14 VITALS
TEMPERATURE: 98 F | WEIGHT: 117.19 LBS | DIASTOLIC BLOOD PRESSURE: 67 MMHG | BODY MASS INDEX: 23 KG/M2 | SYSTOLIC BLOOD PRESSURE: 100 MMHG | HEART RATE: 72 BPM

## 2020-02-14 DIAGNOSIS — E11.65 UNCONTROLLED TYPE 2 DIABETES MELLITUS WITH HYPERGLYCEMIA (HCC): Primary | ICD-10-CM

## 2020-02-14 PROCEDURE — 99213 OFFICE O/P EST LOW 20 MIN: CPT | Performed by: NURSE PRACTITIONER

## 2020-02-14 NOTE — PROGRESS NOTES
Name: Edita Ibrahim   Date: 2/14/2020    Referring Physician: Dr. Nadine Berg is a 22-year-old female who presents for follow up for diabetes management.   Maryrohit Jonesnt was recently hospitalized for STEMI on Disease:  yes – STEMI   Cerebrovascular Disease: no   DIETARY COMPLIANCE:  B= cereal, 2%milk (1/3 cup), omelet with spinach only    L= peanut butter jelly sandwich (eats only a little bit), fish   D= fish, spinach, chicken, ribs, steak, corn, rice, green b times daily. , Disp: 200 each, Rfl: 0  •  Microlet Lancets Does not apply Misc, Check blood sugar twice daily, Disp: 200 each, Rfl: 0  •  Insulin Pen Needle (PEN NEEDLES) 32G X 4 MM Does not apply Misc, 1 each by Does not apply route daily. , Disp: 100 each, cyst   • COLONOSCOPY  2011       PHYSICAL EXAM:    02/14/20  1316   BP: 100/67   Pulse: 72   Temp: 98.1 °F (36.7 °C)   Weight: 117 lb 3.2 oz (53.2 kg)     BMI:   Body mass index is 22.89 kg/m².     General Appearance:  alert, well developed, in no acute dis insulin injection site rotation  -reviewed administration technique of tresiba pen and using once touch ultra glucometer. Also reviewed how to use lancets.    b) Nephropathy: GFR 51 - BMP lab entered to be repeated  c) Discussed importance of annual eye exa

## 2020-02-14 NOTE — TELEPHONE ENCOUNTER
Patient's  called confirming they will come in at 1300 instead of 1400 today. Address to [facility] provided.     [address] [facility]

## 2020-02-14 NOTE — TELEPHONE ENCOUNTER
Unknown Morgan calling for patient. States that since yesterday, patient has been vomiting after her meals. Patient discharged Tuesday night, did okay on Wednesday but vomiting started yesterday, with every meal and again today.  She has had at least one BM s

## 2020-02-14 NOTE — PATIENT INSTRUCTIONS
Continue with Tresiba 10 units daily     Continue with Metformin ER 500mg twice a day (take with food - breakfast & dinner)    Continue checking blood sugar twice a day- before breakfast and before dinner       Write your blood sugars in log provided and

## 2020-02-15 ENCOUNTER — TELEPHONE (OUTPATIENT)
Dept: FAMILY MEDICINE CLINIC | Facility: CLINIC | Age: 65
End: 2020-02-15

## 2020-02-15 NOTE — TELEPHONE ENCOUNTER
Followed up with spouse, reports appt yesterday vomiting discussed, patient was recommended to eat before taking her medication, they have followed this instruction and so far no further vomiting or any other concerns.  They will return call if any other qu

## 2020-02-16 ENCOUNTER — TELEPHONE (OUTPATIENT)
Dept: MEDSURG UNIT | Facility: HOSPITAL | Age: 65
End: 2020-02-16

## 2020-02-17 ENCOUNTER — OFFICE VISIT (OUTPATIENT)
Dept: CARDIOLOGY CLINIC | Facility: HOSPITAL | Age: 65
End: 2020-02-17
Attending: NURSE PRACTITIONER
Payer: MEDICARE

## 2020-02-17 ENCOUNTER — TELEPHONE (OUTPATIENT)
Dept: FAMILY MEDICINE CLINIC | Facility: CLINIC | Age: 65
End: 2020-02-17

## 2020-02-17 VITALS
DIASTOLIC BLOOD PRESSURE: 52 MMHG | WEIGHT: 113.81 LBS | OXYGEN SATURATION: 100 % | HEART RATE: 66 BPM | BODY MASS INDEX: 22 KG/M2 | SYSTOLIC BLOOD PRESSURE: 102 MMHG

## 2020-02-17 DIAGNOSIS — I50.9 HEART FAILURE, UNSPECIFIED (HCC): Primary | ICD-10-CM

## 2020-02-17 DIAGNOSIS — I25.5 ISCHEMIC CARDIOMYOPATHY: ICD-10-CM

## 2020-02-17 LAB
ABSOLUTE IMMATURE GRANULOCYTES (OFFPRE24): NORMAL
ALBUMIN SERPL-MCNC: NORMAL G/DL
ALBUMIN/GLOB SERPL: NORMAL {RATIO}
ALP SERPL-CCNC: NORMAL U/L
ALT SERPL-CCNC: NORMAL U/L
ANION GAP SERPL CALC-SCNC: 6 MMOL/L
ANION GAP SERPL CALC-SCNC: 6 MMOL/L (ref 0–18)
AST SERPL-CCNC: NORMAL U/L
BASO+EOS+MONOS # BLD: NORMAL 10*3/UL
BASO+EOS+MONOS NFR BLD: NORMAL %
BASOPHILS # BLD: NORMAL 10*3/UL
BASOPHILS NFR BLD: NORMAL %
BILIRUB SERPL-MCNC: NORMAL MG/DL
BUN BLD-MCNC: 13 MG/DL (ref 7–18)
BUN SERPL-MCNC: 13 MG/DL
BUN/CREAT SERPL: 14 (ref 10–20)
BUN/CREAT SERPL: NORMAL
CALCIUM BLD-MCNC: 8.4 MG/DL (ref 8.5–10.1)
CALCIUM SERPL-MCNC: 8.4 MG/DL
CHLORIDE SERPL-SCNC: 100 MMOL/L
CHLORIDE SERPL-SCNC: 100 MMOL/L (ref 98–112)
CO2 SERPL-SCNC: 27 MMOL/L
CO2 SERPL-SCNC: 27 MMOL/L (ref 21–32)
CREAT BLD-MCNC: 0.93 MG/DL (ref 0.55–1.02)
CREAT SERPL-MCNC: 0.93 MG/DL
DEPRECATED RDW RBC AUTO: 44.1 FL (ref 35.1–46.3)
DIFFERENTIAL METHOD BLD: NORMAL
EOSINOPHIL # BLD: NORMAL 10*3/UL
EOSINOPHIL NFR BLD: NORMAL %
ERYTHROCYTE [DISTWIDTH] IN BLOOD BY AUTOMATED COUNT: 13.9 % (ref 11–15)
ERYTHROCYTE [DISTWIDTH] IN BLOOD: NORMAL %
GLOBULIN SER-MCNC: NORMAL G/DL
GLUCOSE BLD-MCNC: 68 MG/DL (ref 70–99)
GLUCOSE SERPL-MCNC: 68 MG/DL
HAV IGM SER QL: 1.9 MG/DL (ref 1.6–2.6)
HCT VFR BLD AUTO: 40.2 % (ref 35–48)
HCT VFR BLD CALC: 40.2 %
HGB BLD-MCNC: 13.7 G/DL
HGB BLD-MCNC: 13.7 G/DL (ref 12–16)
IMMATURE GRANULOCYTES (OFFPRE25): NORMAL
LENGTH OF FAST TIME PATIENT: NO H
LYMPHOCYTES # BLD: NORMAL 10*3/UL
LYMPHOCYTES NFR BLD: NORMAL %
MCH RBC QN AUTO: 29.7 PG
MCH RBC QN AUTO: 29.7 PG (ref 26–34)
MCHC RBC AUTO-ENTMCNC: 34.1 G/DL
MCHC RBC AUTO-ENTMCNC: 34.1 G/DL (ref 31–37)
MCV RBC AUTO: 87 FL
MCV RBC AUTO: 87 FL (ref 80–100)
MONOCYTES # BLD: NORMAL 10*3/UL
MONOCYTES NFR BLD: NORMAL %
MPV (OFFPRE2): NORMAL
NEUTROPHILS # BLD: NORMAL 10*3/UL
NEUTROPHILS NFR BLD: NORMAL %
NRBC BLD MANUAL-RTO: NORMAL %
NT-PROBNP SERPL-MCNC: 6382 PG/ML
NT-PROBNP SERPL-MCNC: 6382 PG/ML (ref ?–125)
OSMOLALITY SERPL CALC.SUM OF ELEC: 274 MOSM/KG (ref 275–295)
PATIENT FASTING Y/N/NP: NO
PLAT MORPH BLD: NORMAL
PLATELET # BLD AUTO: 389 10(3)UL (ref 150–450)
PLATELET # BLD: 389 10*3/UL
POTASSIUM SERPL-SCNC: 3.2 MMOL/L
POTASSIUM SERPL-SCNC: 3.2 MMOL/L (ref 3.5–5.1)
PROT SERPL-MCNC: NORMAL G/DL
RBC # BLD AUTO: 4.62 X10(6)UL (ref 3.8–5.3)
RBC # BLD: 4.62 10*6/UL
RBC MORPH BLD: NORMAL
SODIUM SERPL-SCNC: 133 MMOL/L
SODIUM SERPL-SCNC: 133 MMOL/L (ref 136–145)
WBC # BLD AUTO: 10.9 X10(3) UL (ref 4–11)
WBC # BLD: 10.9 10*3/UL
WBC MORPH BLD: NORMAL

## 2020-02-17 PROCEDURE — 85027 COMPLETE CBC AUTOMATED: CPT | Performed by: CLINICAL NURSE SPECIALIST

## 2020-02-17 PROCEDURE — 80048 BASIC METABOLIC PNL TOTAL CA: CPT | Performed by: CLINICAL NURSE SPECIALIST

## 2020-02-17 PROCEDURE — 99212 OFFICE O/P EST SF 10 MIN: CPT | Performed by: CLINICAL NURSE SPECIALIST

## 2020-02-17 PROCEDURE — 36415 COLL VENOUS BLD VENIPUNCTURE: CPT | Performed by: CLINICAL NURSE SPECIALIST

## 2020-02-17 PROCEDURE — 83880 ASSAY OF NATRIURETIC PEPTIDE: CPT | Performed by: CLINICAL NURSE SPECIALIST

## 2020-02-17 PROCEDURE — 99214 OFFICE O/P EST MOD 30 MIN: CPT | Performed by: CLINICAL NURSE SPECIALIST

## 2020-02-17 PROCEDURE — 83735 ASSAY OF MAGNESIUM: CPT | Performed by: CLINICAL NURSE SPECIALIST

## 2020-02-17 RX ORDER — POTASSIUM CHLORIDE 20 MEQ/1
TABLET, EXTENDED RELEASE ORAL
Status: DISCONTINUED
Start: 2020-02-17 | End: 2020-02-17

## 2020-02-17 NOTE — PROGRESS NOTES
[address] Patient Status:  Outpatient    1955 MRN [id number]   Location DO Dr. Colin Linn is a 72year old female who presents to clinic for 2/6/2020:Study Conclusions  1. Left ventricle: The cavity size was normal. Wall thickness was normal. Systolic function was severely reduced. The estimated ejection fraction was 30-35%. Akinesis of the     mid-apicalanteroseptal myocardium.  Akinesis of the ischemic CM  Echo reviewed- EF 35%  LifeVest        Acute COPD exacerbation  Lungs clear         DM2  Follow up with endocrine as an outpatient   Diabetic teaching      Decision Making: Here with her  posthospitalization for follow-up of her newly d daily, call clinic with gain of 3 pounds or greater  Less than 2000 mg sodium daily  Target a high potassium diet, list provided  Repeat labs in outpatient labs in 1 week order provided  Patient to schedule follow-up appointment with Dr. Ina Del Toro, [phone]

## 2020-02-17 NOTE — PATIENT INSTRUCTIONS
Continue wearing LifeVest at all times  Weigh daily, call clinic with gain of 3 pounds or greater  Less than 2000 mg sodium daily  Target a high potassium diet, list provided  Repeat labs in outpatient labs in 1 week order provided  Patient to schedule fol

## 2020-02-17 NOTE — TELEPHONE ENCOUNTER
Ladan NP from the heart failure clinic called to verify what doctor was prescribing and overseeing care for diabetic and psychiatric medications.  Advised patient was seen by Erick RAHMAN the endocrinologist. Also according to PCP last OV notes patient

## 2020-02-18 ENCOUNTER — TELEPHONE (OUTPATIENT)
Dept: CARDIOLOGY | Age: 65
End: 2020-02-18

## 2020-02-18 ENCOUNTER — TELEPHONE (OUTPATIENT)
Dept: ENDOCRINOLOGY CLINIC | Facility: CLINIC | Age: 65
End: 2020-02-18

## 2020-02-18 NOTE — TELEPHONE ENCOUNTER
Patient was contacted and asked RN to speak with  Jovany Calixto. Below instructions were discussed with  as outlined.  wrote down instructions and correctly verbally read back what he wrote.   Rule of 15 discussed with  to manage

## 2020-02-18 NOTE — TELEPHONE ENCOUNTER
Kevin Vyas states patient's Bg# last night was 62 mg/dL; this am was at 68 mg/dL; at 3pm bg# was at 63 mg/dL. Requesting to review patient's medication dose. Please call. Thank you.

## 2020-02-18 NOTE — TELEPHONE ENCOUNTER
From tmrw  STOP insulin  MTF 1000 BID    For tonight  No MTF with dinner today  Discuss hypoglycemia management  Have a good carb diet today, eat a bedtime snack ( crackers) for tonight  Check Bg at bedtime, call if under 70    Call with BG in 1-2 days  so

## 2020-02-18 NOTE — TELEPHONE ENCOUNTER
Dr. Patrick Ellison (on-call), [facility]'s patient,     Please see below message regarding low sugar readings. Contacted patient directly to ask how she's feeling. States when her sugars are on the 60's she is asymptomatic.   They checked blood sugar half an ho

## 2020-02-19 ENCOUNTER — TELEPHONE (OUTPATIENT)
Dept: OTHER | Age: 65
End: 2020-02-19

## 2020-02-19 NOTE — TELEPHONE ENCOUNTER
Pt admitted to [facility] yesterday , will be sending Orders to be signed     BP 90/60- Pt asymptomatic -takes Lisinopril 2.5, metoprolol 50 .  Spironolactone 25 mg 1/2 tablet ---all are taken daily

## 2020-02-20 ENCOUNTER — TELEPHONE (OUTPATIENT)
Dept: CARDIOLOGY | Age: 65
End: 2020-02-20

## 2020-02-20 ENCOUNTER — TELEPHONE (OUTPATIENT)
Dept: CARDIOLOGY CLINIC | Facility: HOSPITAL | Age: 65
End: 2020-02-20

## 2020-02-20 NOTE — TELEPHONE ENCOUNTER
called stating patient has been vomiting and having diarrhea since Tuesday. Has already attempted to take spironolactone later in day with food with no relief. Will stop spironolactone.   Encouraged patient to target high potassium foods as her K

## 2020-02-21 ENCOUNTER — TELEPHONE (OUTPATIENT)
Dept: CARDIOLOGY | Age: 65
End: 2020-02-21

## 2020-02-21 NOTE — TELEPHONE ENCOUNTER
Spoke with , Jovany Calixto [facility]). He verbalizes understanding of message from Dr. Roberto Crews and correctly reads back instructions. RN also encouraged him to write down instructions. He will have Naveed Fink keep BG log and bring to CDE appointment next week.  As

## 2020-02-21 NOTE — TELEPHONE ENCOUNTER
STOP MTF  Jose Elias Miller take 6 daily  Check before BF and before dinner  Call if under 70  Bring BG log to apt with CDE

## 2020-02-21 NOTE — TELEPHONE ENCOUNTER
Dr. Latasha Garg,     Patient's  Julianne Burgess called stating patient has been having diarrhea and vomiting ever since she started on metformin ER (started 02/11/20). They are suspecting that metformin and atorvastatin is causing these.   They stopped both medi

## 2020-02-24 PROBLEM — I21.02 ST ELEVATION MYOCARDIAL INFARCTION INVOLVING LEFT ANTERIOR DESCENDING (LAD) CORONARY ARTERY (CMD): Status: ACTIVE | Noted: 2020-02-24

## 2020-02-24 PROBLEM — E78.00 PURE HYPERCHOLESTEROLEMIA: Status: ACTIVE | Noted: 2020-02-24

## 2020-02-24 PROBLEM — E11.59 TYPE 2 DIABETES MELLITUS WITH CIRCULATORY DISORDER, WITHOUT LONG-TERM CURRENT USE OF INSULIN (CMD): Status: ACTIVE | Noted: 2020-02-24

## 2020-02-24 PROBLEM — J44.9 COPD (CHRONIC OBSTRUCTIVE PULMONARY DISEASE) (CMD): Status: ACTIVE | Noted: 2020-02-24

## 2020-02-24 PROBLEM — I25.5 ISCHEMIC CARDIOMYOPATHY: Status: ACTIVE | Noted: 2020-02-24

## 2020-02-24 PROBLEM — I25.10 CORONARY ARTERY DISEASE INVOLVING NATIVE CORONARY ARTERY OF NATIVE HEART WITHOUT ANGINA PECTORIS: Status: ACTIVE | Noted: 2020-02-24

## 2020-02-24 RX ORDER — SPIRONOLACTONE 25 MG/1
25 TABLET ORAL DAILY
COMMUNITY
Start: 2020-02-11 | End: 2020-05-27 | Stop reason: SDUPTHER

## 2020-02-24 RX ORDER — METOPROLOL SUCCINATE 50 MG/1
50 TABLET, EXTENDED RELEASE ORAL DAILY
COMMUNITY
Start: 2020-02-11 | End: 2020-06-08 | Stop reason: SDUPTHER

## 2020-02-24 RX ORDER — METFORMIN HYDROCHLORIDE 500 MG/1
500 TABLET, EXTENDED RELEASE ORAL 2 TIMES DAILY
COMMUNITY
Start: 2020-02-11 | End: 2020-02-25

## 2020-02-24 RX ORDER — ATORVASTATIN CALCIUM 80 MG/1
80 TABLET, FILM COATED ORAL DAILY
COMMUNITY
Start: 2020-02-10 | End: 2020-06-05 | Stop reason: SDUPTHER

## 2020-02-24 RX ORDER — ASPIRIN 81 MG/1
81 TABLET ORAL DAILY
COMMUNITY
Start: 2020-02-11

## 2020-02-24 RX ORDER — ESCITALOPRAM OXALATE 5 MG/1
5 TABLET ORAL DAILY
COMMUNITY
Start: 2020-02-10

## 2020-02-24 RX ORDER — LISINOPRIL 2.5 MG/1
2.5 TABLET ORAL 2 TIMES DAILY
COMMUNITY
Start: 2020-02-11 | End: 2020-03-17 | Stop reason: SDUPTHER

## 2020-02-24 RX ORDER — ARIPIPRAZOLE 2 MG/1
2 TABLET ORAL DAILY
COMMUNITY
Start: 2020-02-10

## 2020-02-25 ENCOUNTER — APPOINTMENT (OUTPATIENT)
Dept: LAB | Facility: HOSPITAL | Age: 65
End: 2020-02-25
Attending: CLINICAL NURSE SPECIALIST
Payer: MEDICARE

## 2020-02-25 ENCOUNTER — OFFICE VISIT (OUTPATIENT)
Dept: CARDIOLOGY | Age: 65
End: 2020-02-25

## 2020-02-25 VITALS
OXYGEN SATURATION: 99 % | WEIGHT: 111 LBS | SYSTOLIC BLOOD PRESSURE: 94 MMHG | DIASTOLIC BLOOD PRESSURE: 52 MMHG | HEIGHT: 60 IN | HEART RATE: 66 BPM | BODY MASS INDEX: 21.79 KG/M2

## 2020-02-25 DIAGNOSIS — I50.20 SYSTOLIC HEART FAILURE, UNSPECIFIED HF CHRONICITY (CMD): ICD-10-CM

## 2020-02-25 DIAGNOSIS — I25.5 ISCHEMIC CARDIOMYOPATHY: ICD-10-CM

## 2020-02-25 DIAGNOSIS — I21.02 ST ELEVATION MYOCARDIAL INFARCTION INVOLVING LEFT ANTERIOR DESCENDING (LAD) CORONARY ARTERY (CMD): ICD-10-CM

## 2020-02-25 DIAGNOSIS — I50.9 HEART FAILURE, UNSPECIFIED (HCC): ICD-10-CM

## 2020-02-25 DIAGNOSIS — I25.10 CORONARY ARTERY DISEASE INVOLVING NATIVE CORONARY ARTERY OF NATIVE HEART WITHOUT ANGINA PECTORIS: Primary | ICD-10-CM

## 2020-02-25 DIAGNOSIS — E78.00 PURE HYPERCHOLESTEROLEMIA: ICD-10-CM

## 2020-02-25 LAB
ANION GAP SERPL CALC-SCNC: 6 MMOL/L (ref 0–18)
BUN BLD-MCNC: 16 MG/DL (ref 7–18)
BUN/CREAT SERPL: 16.5 (ref 10–20)
CALCIUM BLD-MCNC: 9.1 MG/DL (ref 8.5–10.1)
CHLORIDE SERPL-SCNC: 101 MMOL/L (ref 98–112)
CO2 SERPL-SCNC: 29 MMOL/L (ref 21–32)
CREAT BLD-MCNC: 0.97 MG/DL (ref 0.55–1.02)
GLUCOSE BLD-MCNC: 166 MG/DL (ref 70–99)
OSMOLALITY SERPL CALC.SUM OF ELEC: 287 MOSM/KG (ref 275–295)
PATIENT FASTING Y/N/NP: NO
POTASSIUM SERPL-SCNC: 3.4 MMOL/L (ref 3.5–5.1)
SODIUM SERPL-SCNC: 136 MMOL/L (ref 136–145)

## 2020-02-25 PROCEDURE — 36415 COLL VENOUS BLD VENIPUNCTURE: CPT

## 2020-02-25 PROCEDURE — 80048 BASIC METABOLIC PNL TOTAL CA: CPT

## 2020-02-25 PROCEDURE — 99204 OFFICE O/P NEW MOD 45 MIN: CPT | Performed by: NURSE PRACTITIONER

## 2020-02-25 ASSESSMENT — PATIENT HEALTH QUESTIONNAIRE - PHQ9
1. LITTLE INTEREST OR PLEASURE IN DOING THINGS: NOT AT ALL
2. FEELING DOWN, DEPRESSED OR HOPELESS: NOT AT ALL
SUM OF ALL RESPONSES TO PHQ9 QUESTIONS 1 AND 2: 0
SUM OF ALL RESPONSES TO PHQ9 QUESTIONS 1 AND 2: 0

## 2020-02-28 ENCOUNTER — NURSE ONLY (OUTPATIENT)
Dept: ENDOCRINOLOGY CLINIC | Facility: CLINIC | Age: 65
End: 2020-02-28
Payer: MEDICARE

## 2020-02-28 DIAGNOSIS — E11.65 UNCONTROLLED TYPE 2 DIABETES MELLITUS WITH HYPERGLYCEMIA (HCC): Primary | ICD-10-CM

## 2020-02-28 NOTE — PROGRESS NOTES
Eduardo Campbella was seen for review of glucose levels, review of medications since starting on insulin.      Date: 2/28/2020  Start time: 11:00 AM  End time: 11:30 AM     Patient saw APN for consult earlier this week.  She was recently hospitalized with hy

## 2020-03-02 ENCOUNTER — TELEPHONE (OUTPATIENT)
Dept: ENDOCRINOLOGY CLINIC | Facility: CLINIC | Age: 65
End: 2020-03-02

## 2020-03-02 RX ORDER — GLIPIZIDE 5 MG/1
2.5 TABLET ORAL
Qty: 45 TABLET | Refills: 0 | Status: SHIPPED | OUTPATIENT
Start: 2020-03-02 | End: 2020-05-14

## 2020-03-02 RX ORDER — GLIPIZIDE 5 MG/1
2.5 TABLET ORAL
Qty: 30 TABLET | Refills: 0 | Status: SHIPPED | OUTPATIENT
Start: 2020-03-02 | End: 2020-03-02

## 2020-03-02 NOTE — TELEPHONE ENCOUNTER
Hello,     Blood sugars noted. Please ask patient to increase Tresiba to 10 units daily and start taking 2.5mg glipizide which is 1/2 tab daily with her breakfast.    Please also educate on hypoglycemia associated with glipizide if she skips meals.

## 2020-03-02 NOTE — TELEPHONE ENCOUNTER
Saw Merly Cook on Friday and Since Friday, sugars keep going up and they were advised to call if that happened

## 2020-03-02 NOTE — TELEPHONE ENCOUNTER
SANDRO Torre    Patient was seen by Nenita Villanueva last Friday and  called reporting blood sugar update.                 Before breakfast     Before dinner  2/28      231                       267  2/29      260                       891  3/01

## 2020-03-02 NOTE — TELEPHONE ENCOUNTER
•  glipiZIDE 5 MG Oral Tab, Take 0.5 tablets (2.5 mg total) by mouth every morning before breakfast., Disp: 45 tablet, Rfl: 0

## 2020-03-03 ENCOUNTER — OFFICE VISIT (OUTPATIENT)
Dept: CARDIOLOGY CLINIC | Facility: HOSPITAL | Age: 65
End: 2020-03-03
Attending: NURSE PRACTITIONER
Payer: MEDICARE

## 2020-03-03 VITALS
DIASTOLIC BLOOD PRESSURE: 56 MMHG | SYSTOLIC BLOOD PRESSURE: 104 MMHG | WEIGHT: 108 LBS | BODY MASS INDEX: 21 KG/M2 | HEART RATE: 64 BPM | OXYGEN SATURATION: 98 %

## 2020-03-03 DIAGNOSIS — I25.10 CORONARY ARTERY DISEASE INVOLVING NATIVE CORONARY ARTERY OF NATIVE HEART WITHOUT ANGINA PECTORIS: ICD-10-CM

## 2020-03-03 DIAGNOSIS — I50.23 ACUTE ON CHRONIC HFREF (HEART FAILURE WITH REDUCED EJECTION FRACTION) (HCC): Primary | ICD-10-CM

## 2020-03-03 DIAGNOSIS — I50.9 HEART FAILURE, UNSPECIFIED (HCC): ICD-10-CM

## 2020-03-03 LAB
ANION GAP SERPL CALC-SCNC: 6 MMOL/L
ANION GAP SERPL CALC-SCNC: 6 MMOL/L (ref 0–18)
BUN BLD-MCNC: 18 MG/DL (ref 7–18)
BUN SERPL-MCNC: 18 MG/DL
BUN/CREAT SERPL: 17.1
BUN/CREAT SERPL: 17.1 (ref 10–20)
CALCIUM BLD-MCNC: 9.2 MG/DL (ref 8.5–10.1)
CALCIUM SERPL-MCNC: 9.2 MG/DL
CHLORIDE SERPL-SCNC: 104 MMOL/L
CHLORIDE SERPL-SCNC: 104 MMOL/L (ref 98–112)
CO2 SERPL-SCNC: 26 MMOL/L
CO2 SERPL-SCNC: 26 MMOL/L (ref 21–32)
CREAT BLD-MCNC: 1.05 MG/DL (ref 0.55–1.02)
CREAT SERPL-MCNC: 1.05 MG/DL
GLUCOSE BLD-MCNC: 225 MG/DL (ref 70–99)
GLUCOSE SERPL-MCNC: 225 MG/DL
LENGTH OF FAST TIME PATIENT: NORMAL H
OSMOLALITY SERPL CALC.SUM OF ELEC: 291 MOSM/KG (ref 275–295)
PATIENT FASTING Y/N/NP: NO
POTASSIUM SERPL-SCNC: 4 MMOL/L
POTASSIUM SERPL-SCNC: 4 MMOL/L (ref 3.5–5.1)
SODIUM SERPL-SCNC: 136 MMOL/L
SODIUM SERPL-SCNC: 136 MMOL/L (ref 136–145)

## 2020-03-03 PROCEDURE — 80048 BASIC METABOLIC PNL TOTAL CA: CPT | Performed by: NURSE PRACTITIONER

## 2020-03-03 PROCEDURE — 99212 OFFICE O/P EST SF 10 MIN: CPT | Performed by: NURSE PRACTITIONER

## 2020-03-03 PROCEDURE — 36415 COLL VENOUS BLD VENIPUNCTURE: CPT | Performed by: NURSE PRACTITIONER

## 2020-03-03 PROCEDURE — 99214 OFFICE O/P EST MOD 30 MIN: CPT | Performed by: NURSE PRACTITIONER

## 2020-03-03 NOTE — PATIENT INSTRUCTIONS
Continue current medications    Return to [address] [place] [address] on 4/7/20    Follow up with Dr. Maris Duong 3/11/20      Please call the heart failure clinic if you notice a weight gain of 3 pounds overnight or 5 pounds or more in 5 days.  Call the clinic if any o

## 2020-03-03 NOTE — PROGRESS NOTES
[facility]:  [facility]    1955 MRN [id number]   Location [facility],   Dr. Patricia Nearing is a 72year old female who presents to clinic 02/17/2020 12:28 PM    .0 02/17/2020 12:28 PM    CREATSERUM 0.97 02/25/2020 12:05 PM    BUN 16 02/25/2020 12:05 PM     02/25/2020 12:05 PM    K 3.4 (L) 02/25/2020 12:05 PM     02/25/2020 12:05 PM    CO2 29.0 02/25/2020 12:05 PM    GLU 16 showing wide patency of the stents. 4. Left ventricular angiogram reveals akinesis of the anterolateral wall and the apex in a wrap-around fashion with normal contractility at the base. Estimated ejection fraction of no more than 30%.   There is no mitral medications    Return to [address] [place] [address] on 4/7/20    Follow up with Dr. Son Puentes 3/11/20    Please call the heart failure clinic if you notice a weight gain of 3 pounds overnight or 5 pounds or more in 5 days.  Call the clinic if any of these signs devel

## 2020-03-10 ENCOUNTER — HOSPITAL ENCOUNTER (EMERGENCY)
Facility: HOSPITAL | Age: 65
Discharge: HOME | End: 2020-03-10
Attending: EMERGENCY MEDICINE
Payer: MEDICARE

## 2020-03-10 ENCOUNTER — APPOINTMENT (OUTPATIENT)
Dept: GENERAL RADIOLOGY | Facility: HOSPITAL | Age: 65
End: 2020-03-10
Attending: EMERGENCY MEDICINE
Payer: MEDICARE

## 2020-03-10 VITALS
WEIGHT: 104 LBS | DIASTOLIC BLOOD PRESSURE: 63 MMHG | BODY MASS INDEX: 20.42 KG/M2 | HEIGHT: 60 IN | SYSTOLIC BLOOD PRESSURE: 113 MMHG | OXYGEN SATURATION: 100 % | HEART RATE: 67 BPM | TEMPERATURE: 98 F | RESPIRATION RATE: 16 BRPM

## 2020-03-10 DIAGNOSIS — J06.9 UPPER RESPIRATORY TRACT INFECTION, UNSPECIFIED TYPE: Primary | ICD-10-CM

## 2020-03-10 PROCEDURE — 71046 X-RAY EXAM CHEST 2 VIEWS: CPT | Performed by: EMERGENCY MEDICINE

## 2020-03-10 PROCEDURE — 99283 EMERGENCY DEPT VISIT LOW MDM: CPT

## 2020-03-10 RX ORDER — FLUTICASONE PROPIONATE 50 MCG
2 SPRAY, SUSPENSION (ML) NASAL DAILY
Qty: 16 G | Refills: 0 | Status: SHIPPED | OUTPATIENT
Start: 2020-03-10 | End: 2020-04-09

## 2020-03-11 ENCOUNTER — OFFICE VISIT (OUTPATIENT)
Dept: CARDIOLOGY | Age: 65
End: 2020-03-11

## 2020-03-11 VITALS
HEART RATE: 57 BPM | SYSTOLIC BLOOD PRESSURE: 102 MMHG | RESPIRATION RATE: 18 BRPM | HEIGHT: 60 IN | BODY MASS INDEX: 21.01 KG/M2 | OXYGEN SATURATION: 99 % | WEIGHT: 107 LBS | DIASTOLIC BLOOD PRESSURE: 58 MMHG

## 2020-03-11 DIAGNOSIS — I25.10 CORONARY ARTERY DISEASE INVOLVING NATIVE CORONARY ARTERY OF NATIVE HEART WITHOUT ANGINA PECTORIS: Primary | ICD-10-CM

## 2020-03-11 DIAGNOSIS — E78.00 PURE HYPERCHOLESTEROLEMIA: ICD-10-CM

## 2020-03-11 DIAGNOSIS — I25.5 ISCHEMIC CARDIOMYOPATHY: ICD-10-CM

## 2020-03-11 PROCEDURE — 99214 OFFICE O/P EST MOD 30 MIN: CPT | Performed by: INTERNAL MEDICINE

## 2020-03-11 RX ORDER — GLIPIZIDE 5 MG/1
2.5 TABLET ORAL 2 TIMES DAILY
COMMUNITY

## 2020-03-11 NOTE — ED NOTES
Pt resting in bed in nad at this time. Vss. Villela. Awaiting xr results and dispo. Continuing to monitor.

## 2020-03-11 NOTE — ED NOTES
Pt states that because she is a \"nose breather\" she was concerned about her inability to breathe. rn assessed at bedside if pt was able to breathe through her mouth without difficulty or acute distress, and the pt was able to without difficulty.

## 2020-03-11 NOTE — ED PROVIDER NOTES
Patient Seen in: [facility] AND [facility] Emergency Department      History   Patient presents with:  Cough/URI    Stated Complaint: \"can't breathe through my nose\"    HPI    17-year-old female with history of prior CVA and status post ST elevation myocardial equal and round no pallor or injection  ENT, Mouth: mucous membranes moist.  Bilateral TMs normal-appearing. Swollen nasal turbinates. Oropharynx normal-appearing.   Respiratory: there are no retractions, lungs are clear to auscultation  Cardiovascular: r

## 2020-03-11 NOTE — ED NOTES
Pt and spouse provided and explained d/c instructions, at-home care, follow-up, and rx. Pt in nad at this time. No iv access. Vss. Villela. Ambulatory. A&ox3. Belongings with pt. All questions and concerns addressed.

## 2020-03-12 ENCOUNTER — TELEPHONE (OUTPATIENT)
Dept: INTERNAL MEDICINE CLINIC | Facility: CLINIC | Age: 65
End: 2020-03-12

## 2020-03-12 NOTE — TELEPHONE ENCOUNTER
Pt needs new Rx for Lisinopril 2.5 mg, 1 tab twice daily. Pt. States that the dosage has been increased. Current Outpatient Medications:     •  lisinopril 2.5 MG Oral Tab, Take 1 tablet (2.5 mg total) by mouth daily. , Disp: 30 tablet, Rfl: 3  •

## 2020-03-16 NOTE — TELEPHONE ENCOUNTER
Patient not seen at this office,  Seen at [facility] office   faxed request to  #[phone]

## 2020-03-17 ENCOUNTER — TELEPHONE (OUTPATIENT)
Dept: CARDIOLOGY | Age: 65
End: 2020-03-17

## 2020-03-17 RX ORDER — LISINOPRIL 2.5 MG/1
2.5 TABLET ORAL 2 TIMES DAILY
Qty: 180 TABLET | Refills: 1 | Status: SHIPPED | OUTPATIENT
Start: 2020-03-17 | End: 2020-04-13 | Stop reason: SINTOL

## 2020-03-20 RX ORDER — LANCETS 33 GAUGE
EACH MISCELLANEOUS
Qty: 200 EACH | Refills: 1 | Status: SHIPPED | OUTPATIENT
Start: 2020-03-20 | End: 2020-11-04

## 2020-03-20 NOTE — TELEPHONE ENCOUNTER
One touch delica 21Q extra fine   One touch ultra blue test strips     Pt is running out of supplies - pharm was supposed to call in rxs last week

## 2020-03-23 ENCOUNTER — TELEPHONE (OUTPATIENT)
Dept: ENDOCRINOLOGY CLINIC | Facility: CLINIC | Age: 65
End: 2020-03-23

## 2020-03-23 NOTE — TELEPHONE ENCOUNTER
Prior authorization needed    •  OneTouch Delica Lancets 82L Does not apply Misc, Use to check blood sugars twice a day., Disp: 200 each, Rfl: 1    •  Glucose Blood (ONETOUCH ULTRA BLUE) In Vitro Strip, Use to check blood sugars twice a day., Disp: 200 str

## 2020-03-24 NOTE — TELEPHONE ENCOUNTER
Douglas Rico RN inquired regarding below message. Sommer Montoya (pharmacist) clarified that CMN is what is needed not prior-authorization. It isn't a brand issue. CMN filled out and placed on your desk for review and signature. Thank you.

## 2020-03-26 ENCOUNTER — MED REC SCAN ONLY (OUTPATIENT)
Dept: FAMILY MEDICINE CLINIC | Facility: CLINIC | Age: 65
End: 2020-03-26

## 2020-03-31 ENCOUNTER — TELEPHONE (OUTPATIENT)
Dept: ENDOCRINOLOGY CLINIC | Facility: CLINIC | Age: 65
End: 2020-03-31

## 2020-03-31 NOTE — TELEPHONE ENCOUNTER
Current Outpatient Medications   Medication Sig Dispense Refill   • Insulin Pen Needle (PEN NEEDLES) 32G X 4 MM Does not apply Misc 1 each by Does not apply route daily.  100 each 0

## 2020-03-31 NOTE — TELEPHONE ENCOUNTER
Current Outpatient Medications   Medication Sig Dispense Refill   • Glucose Blood (ONETOUCH ULTRA BLUE) In Vitro Strip Use to check blood sugars twice a day. 200 strip 1     Per pharmacy patient's plan does not cover this med.   Please call plan at [phone]

## 2020-04-01 RX ORDER — PEN NEEDLE, DIABETIC 30 GX3/16"
1 NEEDLE, DISPOSABLE MISCELLANEOUS DAILY
Qty: 100 EACH | Refills: 0 | Status: SHIPPED | OUTPATIENT
Start: 2020-04-01 | End: 2020-06-23

## 2020-04-01 NOTE — TELEPHONE ENCOUNTER
LOV; 02/14/20  LR: 02/11/20    Future Appointments   Date Time Provider Brittney Angel   5/14/2020 11:45 AM LACEY Rolon [facility]     Refilled per protocol

## 2020-04-01 NOTE — TELEPHONE ENCOUNTER
Contacted pharmacy and was informed CMN is needed (please refer to encounter dated 03/23/20. It was faxed 3/26/20. Per Yasmine Vega, pharmacist, he will try running the claim again  But their Medicare department is overwhelmed at the moment.   He suggested refax

## 2020-04-03 NOTE — TELEPHONE ENCOUNTER
Pts  called for refill:      Current Outpatient Medications:   •  Insulin Degludec (TRESIBA FLEXTOUCH) 100 UNIT/ML Subcutaneous Solution Pen-injector, Inject 10 Units into the skin daily. , Disp: 9 mL, Rfl: 0

## 2020-04-03 NOTE — TELEPHONE ENCOUNTER
LOV: 02/14/20    Future Appointments   Date Time Provider Brittney Angel   5/14/2020 11:45 AM LACEY Velez [facility]

## 2020-04-09 ENCOUNTER — VIRTUAL PHONE E/M (OUTPATIENT)
Dept: CARDIOLOGY CLINIC | Facility: HOSPITAL | Age: 65
End: 2020-04-09
Attending: NURSE PRACTITIONER
Payer: MEDICARE

## 2020-04-09 DIAGNOSIS — I25.10 CORONARY ARTERY DISEASE INVOLVING NATIVE CORONARY ARTERY OF NATIVE HEART WITHOUT ANGINA PECTORIS: ICD-10-CM

## 2020-04-09 DIAGNOSIS — I50.23 ACUTE ON CHRONIC HFREF (HEART FAILURE WITH REDUCED EJECTION FRACTION) (HCC): Primary | ICD-10-CM

## 2020-04-09 PROCEDURE — 99442 PR TELEPHONE EVAL AND MGNT EST PATIENT 11-20 MIN MEDICAL DISCUSSION: CPT | Performed by: NURSE PRACTITIONER

## 2020-04-09 RX ORDER — LISINOPRIL 2.5 MG/1
2.5 TABLET ORAL 2 TIMES DAILY
Qty: 30 TABLET | Refills: 3 | COMMUNITY
Start: 2020-04-09 | End: 2020-07-10

## 2020-04-09 NOTE — PROGRESS NOTES
Virtual Telephone Check-In    Amira Hameed verbally consents to a Virtual/Telephone Check-In visit on 04/09/20. Patient understands and accepts financial responsibility for any deductible, co-insurance and/or co-pays associated with this service. function stable BUN/Cr 18/1.05, K 4.0  -Recommend Holding Lisinopril x 3 days. If cough resolves will start ARB.    -Continue daily weights and Strict I/Os  -Follow up in [address] [place] [address] as needed or directed  -Follow up with SANDRO Valencia on Mon

## 2020-04-13 ENCOUNTER — OFFICE VISIT (OUTPATIENT)
Dept: CARDIOLOGY | Age: 65
End: 2020-04-13

## 2020-04-13 VITALS
HEIGHT: 60 IN | DIASTOLIC BLOOD PRESSURE: 58 MMHG | SYSTOLIC BLOOD PRESSURE: 102 MMHG | OXYGEN SATURATION: 99 % | WEIGHT: 104 LBS | HEART RATE: 54 BPM | BODY MASS INDEX: 20.42 KG/M2

## 2020-04-13 DIAGNOSIS — I25.5 ISCHEMIC CARDIOMYOPATHY: Primary | ICD-10-CM

## 2020-04-13 DIAGNOSIS — E78.00 PURE HYPERCHOLESTEROLEMIA: ICD-10-CM

## 2020-04-13 DIAGNOSIS — I50.20 SYSTOLIC HEART FAILURE, UNSPECIFIED HF CHRONICITY (CMD): ICD-10-CM

## 2020-04-13 PROCEDURE — 99214 OFFICE O/P EST MOD 30 MIN: CPT | Performed by: NURSE PRACTITIONER

## 2020-04-13 RX ORDER — PEN NEEDLE, DIABETIC 32GX 5/32"
NEEDLE, DISPOSABLE MISCELLANEOUS
COMMUNITY
Start: 2020-04-01 | End: 2020-04-13 | Stop reason: SDUPTHER

## 2020-04-13 RX ORDER — PEN NEEDLE, DIABETIC 30 GX3/16"
1 NEEDLE, DISPOSABLE MISCELLANEOUS
COMMUNITY
Start: 2020-04-01

## 2020-04-13 RX ORDER — INSULIN DEGLUDEC INJECTION 100 U/ML
INJECTION, SOLUTION SUBCUTANEOUS
COMMUNITY
Start: 2020-04-09

## 2020-04-13 RX ORDER — LANCETS 33 GAUGE
EACH MISCELLANEOUS
COMMUNITY
Start: 2020-03-20

## 2020-04-13 RX ORDER — VALSARTAN 40 MG/1
TABLET ORAL
Qty: 30 TABLET | Refills: 6 | Status: SHIPPED | OUTPATIENT
Start: 2020-04-13 | End: 2020-05-15 | Stop reason: DRUGHIGH

## 2020-04-13 ASSESSMENT — PATIENT HEALTH QUESTIONNAIRE - PHQ9
2. FEELING DOWN, DEPRESSED OR HOPELESS: NOT AT ALL
1. LITTLE INTEREST OR PLEASURE IN DOING THINGS: NOT AT ALL
SUM OF ALL RESPONSES TO PHQ9 QUESTIONS 1 AND 2: 0
SUM OF ALL RESPONSES TO PHQ9 QUESTIONS 1 AND 2: 0

## 2020-04-14 ENCOUNTER — TELEPHONE (OUTPATIENT)
Dept: FAMILY MEDICINE CLINIC | Facility: CLINIC | Age: 65
End: 2020-04-14

## 2020-04-14 NOTE — TELEPHONE ENCOUNTER
[facility] nurse called to inform us that the patient is ready to for discharge, they plan to discharge on Friday and just need a physician's verbal order that it is okay to discharge patient.

## 2020-04-20 ENCOUNTER — LAB ENCOUNTER (OUTPATIENT)
Dept: LAB | Age: 65
End: 2020-04-20
Attending: NURSE PRACTITIONER
Payer: MEDICARE

## 2020-04-20 ENCOUNTER — MED REC SCAN ONLY (OUTPATIENT)
Dept: FAMILY MEDICINE CLINIC | Facility: CLINIC | Age: 65
End: 2020-04-20

## 2020-04-20 DIAGNOSIS — I25.5 ISCHEMIC CARDIOMYOPATHY: Primary | ICD-10-CM

## 2020-04-20 LAB
ANION GAP SERPL CALC-SCNC: 7 MMOL/L
BUN SERPL-MCNC: 15 MG/DL
BUN/CREAT SERPL: 16.3
CALCIUM SERPL-MCNC: 8.8 MG/DL
CHLORIDE SERPL-SCNC: 109 MMOL/L
CO2 SERPL-SCNC: 24 MMOL/L
CREAT SERPL-MCNC: 0.92 MG/DL
GLUCOSE SERPL-MCNC: 185 MG/DL
LENGTH OF FAST TIME PATIENT: YES H
POTASSIUM SERPL-SCNC: 4 MMOL/L
SODIUM SERPL-SCNC: 140 MMOL/L

## 2020-04-20 PROCEDURE — 80048 BASIC METABOLIC PNL TOTAL CA: CPT

## 2020-04-20 PROCEDURE — 36415 COLL VENOUS BLD VENIPUNCTURE: CPT

## 2020-04-21 ENCOUNTER — TELEPHONE (OUTPATIENT)
Dept: CARDIOLOGY | Age: 65
End: 2020-04-21

## 2020-04-21 ENCOUNTER — CLINICAL ABSTRACT (OUTPATIENT)
Dept: CARDIOLOGY | Age: 65
End: 2020-04-21

## 2020-04-30 ENCOUNTER — TELEPHONE (OUTPATIENT)
Dept: ENDOCRINOLOGY CLINIC | Facility: CLINIC | Age: 65
End: 2020-04-30

## 2020-04-30 NOTE — TELEPHONE ENCOUNTER
Tried to contact the patient and Bbuba Rose answered (on MATTHEW) and relayed below message as outlined. He voiced understanding and denied further questions at this time.   RN advised him to contact the office if they run into any issues with blood sugar (readi

## 2020-04-30 NOTE — TELEPHONE ENCOUNTER
Patient mailed blood sugar log, readings reviewed and overall her readings have significantly improved. Please inform patient that we will CPM at this time and plan to see patient at next office visit as scheduled 5/14.      Patient can continue checkin

## 2020-05-14 ENCOUNTER — TELEPHONE (OUTPATIENT)
Dept: CARDIOLOGY | Age: 65
End: 2020-05-14

## 2020-05-14 ENCOUNTER — OFFICE VISIT (OUTPATIENT)
Dept: ENDOCRINOLOGY CLINIC | Facility: CLINIC | Age: 65
End: 2020-05-14
Payer: MEDICARE

## 2020-05-14 VITALS
SYSTOLIC BLOOD PRESSURE: 102 MMHG | HEART RATE: 51 BPM | DIASTOLIC BLOOD PRESSURE: 70 MMHG | BODY MASS INDEX: 22 KG/M2 | WEIGHT: 111.38 LBS

## 2020-05-14 DIAGNOSIS — E11.9 TYPE 2 DIABETES MELLITUS WITHOUT COMPLICATION, WITH LONG-TERM CURRENT USE OF INSULIN (HCC): Primary | ICD-10-CM

## 2020-05-14 DIAGNOSIS — E78.00 PURE HYPERCHOLESTEROLEMIA: ICD-10-CM

## 2020-05-14 DIAGNOSIS — Z79.4 TYPE 2 DIABETES MELLITUS WITHOUT COMPLICATION, WITH LONG-TERM CURRENT USE OF INSULIN (HCC): Primary | ICD-10-CM

## 2020-05-14 PROCEDURE — 36416 COLLJ CAPILLARY BLOOD SPEC: CPT | Performed by: NURSE PRACTITIONER

## 2020-05-14 PROCEDURE — 83036 HEMOGLOBIN GLYCOSYLATED A1C: CPT | Performed by: NURSE PRACTITIONER

## 2020-05-14 PROCEDURE — 99213 OFFICE O/P EST LOW 20 MIN: CPT | Performed by: NURSE PRACTITIONER

## 2020-05-14 PROCEDURE — 82962 GLUCOSE BLOOD TEST: CPT | Performed by: NURSE PRACTITIONER

## 2020-05-14 RX ORDER — GLIPIZIDE 5 MG/1
2.5 TABLET ORAL
Qty: 90 TABLET | Refills: 0 | COMMUNITY
Start: 2020-05-14 | End: 2020-05-29

## 2020-05-14 NOTE — PROGRESS NOTES
Name: Ekaterina Soto  Date: 5/14/2020    Referring Physician: No ref. provider found    CHIEF COMPLAINT   Patient presents with:   Follow - Up: DM follow up, A1C    HISTORY OF PRESENT ILLNESS   Ekaterina Soto is a 72year old female who presents for DIABETES COMPLICATIONS:  History of Retinopathy: yes - last eye exam 1 month ago  History of Neuropathy: no   History of Nephropathy: no     ASSOCIATED COMPLICATIONS:   HTN: no   Hyperlipidemia: yes- on statin   Coronary Artery Disease:  Yes -STEMI  Cerebr Disp: 100 each, Rfl: 0  •  OneTouch Delica Lancets 98H Does not apply Misc, Use to check blood sugars twice a day., Disp: 200 each, Rfl: 1  •  Glucose Blood (ONETOUCH ULTRA BLUE) In Vitro Strip, Use to check blood sugars twice a day., Disp: 200 strip, Rfl: No    PAST MEDICAL HISTORY:   Past Medical History:   Diagnosis Date   • Breast cyst 1977   • Cataract 2012   • Cup to disc asymmetry 2012    increased C/D ratio   • CVA (cerebral vascular accident) [facility])    • Hemorrhoids 2011   • No diabetic retinopathy O breakfast and before dinner).  Patient instructed that she should check more frequently if feeling as if blood sugar is low or high.    -Normal renal & liver function.   -Reviewed with patient increased risk of hypoglycemia while on glipizide   -Discussed i reference    The risks and benefits of my recommendations were discussed with the patient today. Questions were also answered to the best of my knowledge. Patient verbalizes understanding of these issues and agrees to the plan.     5/14/2020  Aaron Hurley,

## 2020-05-14 NOTE — PATIENT INSTRUCTIONS
Continue with Keily Michelle 10 units once daily       Increase Glipizide 2.5mg to twice daily with breakfast and with dinner meals       Check blood sugar once daily (can alternate with before breakfast and before dinner).                      -> check more allyson

## 2020-05-15 ENCOUNTER — OFFICE VISIT (OUTPATIENT)
Dept: CARDIOLOGY | Age: 65
End: 2020-05-15

## 2020-05-15 VITALS
DIASTOLIC BLOOD PRESSURE: 62 MMHG | OXYGEN SATURATION: 99 % | HEIGHT: 60 IN | WEIGHT: 111 LBS | HEART RATE: 53 BPM | BODY MASS INDEX: 21.79 KG/M2 | SYSTOLIC BLOOD PRESSURE: 110 MMHG

## 2020-05-15 DIAGNOSIS — I21.02 ST ELEVATION MYOCARDIAL INFARCTION INVOLVING LEFT ANTERIOR DESCENDING (LAD) CORONARY ARTERY (CMD): ICD-10-CM

## 2020-05-15 DIAGNOSIS — R06.02 SHORTNESS OF BREATH: Primary | ICD-10-CM

## 2020-05-15 DIAGNOSIS — I25.5 ISCHEMIC CARDIOMYOPATHY: ICD-10-CM

## 2020-05-15 PROCEDURE — 99214 OFFICE O/P EST MOD 30 MIN: CPT | Performed by: NURSE PRACTITIONER

## 2020-05-15 RX ORDER — VALSARTAN 40 MG/1
40 TABLET ORAL DAILY
COMMUNITY
End: 2020-05-15 | Stop reason: SDUPTHER

## 2020-05-15 RX ORDER — VALSARTAN 40 MG/1
TABLET ORAL
Status: SHIPPED | COMMUNITY
Start: 2020-05-15 | End: 2021-01-04 | Stop reason: ALTCHOICE

## 2020-05-15 ASSESSMENT — PATIENT HEALTH QUESTIONNAIRE - PHQ9
1. LITTLE INTEREST OR PLEASURE IN DOING THINGS: NOT AT ALL
SUM OF ALL RESPONSES TO PHQ9 QUESTIONS 1 AND 2: 0
SUM OF ALL RESPONSES TO PHQ9 QUESTIONS 1 AND 2: 0
2. FEELING DOWN, DEPRESSED OR HOPELESS: NOT AT ALL

## 2020-05-21 RX ORDER — SPIRONOLACTONE 25 MG/1
TABLET ORAL
Qty: 15 TABLET | Refills: 3 | OUTPATIENT
Start: 2020-05-21

## 2020-05-26 ENCOUNTER — TELEPHONE (OUTPATIENT)
Dept: CARDIOLOGY | Age: 65
End: 2020-05-26

## 2020-05-27 RX ORDER — SPIRONOLACTONE 25 MG/1
25 TABLET ORAL DAILY
Qty: 30 TABLET | Refills: 0 | Status: SHIPPED | OUTPATIENT
Start: 2020-05-27 | End: 2020-05-28

## 2020-05-28 RX ORDER — SPIRONOLACTONE 25 MG/1
25 TABLET ORAL DAILY
Qty: 90 TABLET | Refills: 2 | Status: SHIPPED | OUTPATIENT
Start: 2020-05-28 | End: 2021-02-22 | Stop reason: SDUPTHER

## 2020-05-29 ENCOUNTER — TELEPHONE (OUTPATIENT)
Dept: ENDOCRINOLOGY CLINIC | Facility: CLINIC | Age: 65
End: 2020-05-29

## 2020-05-29 DIAGNOSIS — Z79.4 TYPE 2 DIABETES MELLITUS WITHOUT COMPLICATION, WITH LONG-TERM CURRENT USE OF INSULIN (HCC): ICD-10-CM

## 2020-05-29 DIAGNOSIS — E11.9 TYPE 2 DIABETES MELLITUS WITHOUT COMPLICATION, WITH LONG-TERM CURRENT USE OF INSULIN (HCC): ICD-10-CM

## 2020-05-29 RX ORDER — GLIPIZIDE 5 MG/1
2.5 TABLET ORAL
Qty: 90 TABLET | Refills: 0 | Status: SHIPPED | OUTPATIENT
Start: 2020-05-29 | End: 2020-08-14

## 2020-05-29 NOTE — TELEPHONE ENCOUNTER
Patient called to obtain update on recent blood sugar readings after increasing glipizide dose to BID. Spoke to Jose Luis Gonzáles (spouse) who reported BG readings from glucometer.      5/15 before dinner 158  5/16 before breakfast 156   5/17 Before dinner: 156.  5/

## 2020-05-29 NOTE — TELEPHONE ENCOUNTER
Shira Bazan requesting refills for Glipizide. Please call. Thank you. Current Outpatient Medications   Medication Sig Dispense Refill   • glipiZIDE 5 MG Oral Tab Take 0.5 tablets (2.5 mg total) by mouth 2 (two) times daily before meals.  90 tablet 0

## 2020-06-05 RX ORDER — ATORVASTATIN CALCIUM 80 MG/1
80 TABLET, FILM COATED ORAL DAILY
Qty: 90 TABLET | Refills: 0 | Status: SHIPPED | OUTPATIENT
Start: 2020-06-05 | End: 2020-08-25

## 2020-06-08 ENCOUNTER — TELEPHONE (OUTPATIENT)
Dept: CARDIOLOGY | Age: 65
End: 2020-06-08

## 2020-06-08 RX ORDER — METOPROLOL SUCCINATE 50 MG/1
50 TABLET, EXTENDED RELEASE ORAL DAILY
Qty: 90 TABLET | Refills: 3 | Status: SHIPPED | OUTPATIENT
Start: 2020-06-08 | End: 2021-06-01 | Stop reason: SDUPTHER

## 2020-06-10 ENCOUNTER — LAB ENCOUNTER (OUTPATIENT)
Dept: LAB | Age: 65
End: 2020-06-10
Attending: NURSE PRACTITIONER
Payer: MEDICARE

## 2020-06-10 DIAGNOSIS — I25.5 ISCHEMIC CARDIOMYOPATHY: ICD-10-CM

## 2020-06-10 DIAGNOSIS — I50.20 SYSTOLIC HEART FAILURE (HCC): Primary | ICD-10-CM

## 2020-06-10 LAB
ALT SERPL-CCNC: 78 U/L
ANION GAP SERPL CALC-SCNC: 5 MMOL/L
AST SERPL-CCNC: 36 U/L
BUN SERPL-MCNC: 18 MG/DL
BUN/CREAT SERPL: 20.2
CALCIUM SERPL-MCNC: 9 MG/DL
CHLORIDE SERPL-SCNC: 108 MMOL/L
CHOLEST SERPL-MCNC: 107 MG/DL
CO2 SERPL-SCNC: 27 MMOL/L
CREAT SERPL-MCNC: 0.89 MG/DL
GLUCOSE SERPL-MCNC: 143 MG/DL
HDLC SERPL-MCNC: 35 MG/DL
LDLC SERPL CALC-MCNC: 54 MG/DL
LENGTH OF FAST TIME PATIENT: YES H
LENGTH OF FAST TIME PATIENT: YES H
NONHDLC SERPL-MCNC: 72 MG/DL
POTASSIUM SERPL-SCNC: 4.4 MMOL/L
SODIUM SERPL-SCNC: 140 MMOL/L
TRIGL SERPL-MCNC: 91 MG/DL
VLDLC SERPL CALC-MCNC: 18 MG/DL

## 2020-06-10 PROCEDURE — 80061 LIPID PANEL: CPT

## 2020-06-10 PROCEDURE — 84450 TRANSFERASE (AST) (SGOT): CPT

## 2020-06-10 PROCEDURE — 80048 BASIC METABOLIC PNL TOTAL CA: CPT

## 2020-06-10 PROCEDURE — 36415 COLL VENOUS BLD VENIPUNCTURE: CPT

## 2020-06-10 PROCEDURE — 84460 ALANINE AMINO (ALT) (SGPT): CPT

## 2020-06-11 ENCOUNTER — CLINICAL ABSTRACT (OUTPATIENT)
Dept: CARDIOLOGY | Age: 65
End: 2020-06-11

## 2020-06-11 ENCOUNTER — TELEPHONE (OUTPATIENT)
Dept: CARDIOLOGY | Age: 65
End: 2020-06-11

## 2020-06-11 DIAGNOSIS — E78.00 PURE HYPERCHOLESTEROLEMIA: Primary | ICD-10-CM

## 2020-06-17 ENCOUNTER — ANCILLARY PROCEDURE (OUTPATIENT)
Dept: CARDIOLOGY | Age: 65
End: 2020-06-17
Attending: NURSE PRACTITIONER

## 2020-06-17 ENCOUNTER — OFFICE VISIT (OUTPATIENT)
Dept: CARDIOLOGY | Age: 65
End: 2020-06-17

## 2020-06-17 VITALS
HEIGHT: 61 IN | RESPIRATION RATE: 18 BRPM | HEART RATE: 51 BPM | DIASTOLIC BLOOD PRESSURE: 62 MMHG | SYSTOLIC BLOOD PRESSURE: 106 MMHG | OXYGEN SATURATION: 98 % | WEIGHT: 111 LBS | BODY MASS INDEX: 20.96 KG/M2

## 2020-06-17 DIAGNOSIS — I25.5 ISCHEMIC CARDIOMYOPATHY: ICD-10-CM

## 2020-06-17 DIAGNOSIS — I25.10 CORONARY ARTERY DISEASE INVOLVING NATIVE CORONARY ARTERY OF NATIVE HEART WITHOUT ANGINA PECTORIS: Primary | ICD-10-CM

## 2020-06-17 PROCEDURE — 93306 TTE W/DOPPLER COMPLETE: CPT | Performed by: INTERNAL MEDICINE

## 2020-06-17 PROCEDURE — 99214 OFFICE O/P EST MOD 30 MIN: CPT | Performed by: INTERNAL MEDICINE

## 2020-06-17 ASSESSMENT — PATIENT HEALTH QUESTIONNAIRE - PHQ9
SUM OF ALL RESPONSES TO PHQ9 QUESTIONS 1 AND 2: 0
2. FEELING DOWN, DEPRESSED OR HOPELESS: NOT AT ALL
SUM OF ALL RESPONSES TO PHQ9 QUESTIONS 1 AND 2: 0
CLINICAL INTERPRETATION OF PHQ9 SCORE: NO FURTHER SCREENING NEEDED
1. LITTLE INTEREST OR PLEASURE IN DOING THINGS: NOT AT ALL
CLINICAL INTERPRETATION OF PHQ2 SCORE: NO FURTHER SCREENING NEEDED

## 2020-06-23 RX ORDER — PEN NEEDLE, DIABETIC 32GX 5/32"
NEEDLE, DISPOSABLE MISCELLANEOUS
Qty: 100 EACH | Refills: 0 | Status: SHIPPED | OUTPATIENT
Start: 2020-06-23 | End: 2020-11-12

## 2020-06-26 ENCOUNTER — OFFICE VISIT (OUTPATIENT)
Dept: CARDIOLOGY | Age: 65
End: 2020-06-26

## 2020-06-26 ENCOUNTER — TELEPHONE (OUTPATIENT)
Dept: CARDIOLOGY | Age: 65
End: 2020-06-26

## 2020-06-26 VITALS
OXYGEN SATURATION: 99 % | BODY MASS INDEX: 21.2 KG/M2 | DIASTOLIC BLOOD PRESSURE: 70 MMHG | WEIGHT: 108 LBS | HEART RATE: 51 BPM | SYSTOLIC BLOOD PRESSURE: 90 MMHG | HEIGHT: 60 IN

## 2020-06-26 DIAGNOSIS — I25.5 ISCHEMIC CARDIOMYOPATHY: ICD-10-CM

## 2020-06-26 DIAGNOSIS — I25.10 CORONARY ARTERY DISEASE INVOLVING NATIVE CORONARY ARTERY OF NATIVE HEART WITHOUT ANGINA PECTORIS: Primary | ICD-10-CM

## 2020-06-26 DIAGNOSIS — J44.9 CHRONIC OBSTRUCTIVE PULMONARY DISEASE, UNSPECIFIED COPD TYPE (CMD): ICD-10-CM

## 2020-06-26 DIAGNOSIS — I50.20 SYSTOLIC HEART FAILURE, UNSPECIFIED HF CHRONICITY (CMD): ICD-10-CM

## 2020-06-26 DIAGNOSIS — I50.20 SYSTOLIC HEART FAILURE, UNSPECIFIED HF CHRONICITY (CMD): Primary | ICD-10-CM

## 2020-06-26 DIAGNOSIS — E78.00 PURE HYPERCHOLESTEROLEMIA: ICD-10-CM

## 2020-06-26 PROCEDURE — 93000 ELECTROCARDIOGRAM COMPLETE: CPT | Performed by: INTERNAL MEDICINE

## 2020-06-26 PROCEDURE — 99205 OFFICE O/P NEW HI 60 MIN: CPT | Performed by: INTERNAL MEDICINE

## 2020-06-26 ASSESSMENT — PATIENT HEALTH QUESTIONNAIRE - PHQ9
1. LITTLE INTEREST OR PLEASURE IN DOING THINGS: NOT AT ALL
SUM OF ALL RESPONSES TO PHQ9 QUESTIONS 1 AND 2: 0
SUM OF ALL RESPONSES TO PHQ9 QUESTIONS 1 AND 2: 0
CLINICAL INTERPRETATION OF PHQ2 SCORE: NO FURTHER SCREENING NEEDED
2. FEELING DOWN, DEPRESSED OR HOPELESS: NOT AT ALL
CLINICAL INTERPRETATION OF PHQ9 SCORE: NO FURTHER SCREENING NEEDED

## 2020-06-29 ENCOUNTER — TELEPHONE (OUTPATIENT)
Dept: CARDIOLOGY | Age: 65
End: 2020-06-29

## 2020-07-06 ENCOUNTER — LAB ENCOUNTER (OUTPATIENT)
Dept: LAB | Facility: HOSPITAL | Age: 65
End: 2020-07-06
Attending: INTERNAL MEDICINE
Payer: MEDICARE

## 2020-07-06 ENCOUNTER — TELEPHONE (OUTPATIENT)
Dept: CARDIOLOGY | Age: 65
End: 2020-07-06

## 2020-07-06 ENCOUNTER — HOSPITAL ENCOUNTER (OUTPATIENT)
Dept: GENERAL RADIOLOGY | Facility: HOSPITAL | Age: 65
Discharge: HOME | End: 2020-07-06
Attending: INTERNAL MEDICINE
Payer: MEDICARE

## 2020-07-06 DIAGNOSIS — I50.20 SYSTOLIC HEART FAILURE (HCC): ICD-10-CM

## 2020-07-06 DIAGNOSIS — I50.20 SYSTOLIC HEART FAILURE, UNSPECIFIED HF CHRONICITY (HCC): ICD-10-CM

## 2020-07-06 DIAGNOSIS — I25.10 CORONARY ATHEROSCLEROSIS OF NATIVE CORONARY ARTERY: Primary | ICD-10-CM

## 2020-07-06 DIAGNOSIS — I25.10 CORONARY ARTERY DISEASE INVOLVING NATIVE CORONARY ARTERY OF NATIVE HEART WITHOUT ANGINA PECTORIS: ICD-10-CM

## 2020-07-06 DIAGNOSIS — I25.5 ISCHEMIC CARDIOMYOPATHY: ICD-10-CM

## 2020-07-06 LAB
ANION GAP SERPL CALC-SCNC: 6 MMOL/L (ref 0–18)
BASOPHILS # BLD AUTO: 0.08 X10(3) UL (ref 0–0.2)
BASOPHILS NFR BLD AUTO: 0.8 %
BUN BLD-MCNC: 17 MG/DL (ref 7–18)
BUN SERPL-MCNC: 17 MG/DL
BUN/CREAT SERPL: 18.7 (ref 10–20)
CALCIUM BLD-MCNC: 8.9 MG/DL (ref 8.5–10.1)
CALCIUM SERPL-MCNC: 8.9 MG/DL
CHLORIDE SERPL-SCNC: 108 MMOL/L
CHLORIDE SERPL-SCNC: 108 MMOL/L (ref 98–112)
CO2 SERPL-SCNC: 26 MMOL/L (ref 21–32)
CREAT BLD-MCNC: 0.91 MG/DL (ref 0.55–1.02)
CREAT SERPL-MCNC: 0.91 MG/DL
DEPRECATED RDW RBC AUTO: 48.4 FL (ref 35.1–46.3)
EOSINOPHIL # BLD AUTO: 0.18 X10(3) UL (ref 0–0.7)
EOSINOPHIL NFR BLD AUTO: 1.8 %
ERYTHROCYTE [DISTWIDTH] IN BLOOD BY AUTOMATED COUNT: 15.3 % (ref 11–15)
GLUCOSE BLD-MCNC: 169 MG/DL (ref 70–99)
GLUCOSE SERPL-MCNC: 169 MG/DL
HCT VFR BLD AUTO: 39.6 % (ref 35–48)
HGB BLD-MCNC: 12.9 G/DL
HGB BLD-MCNC: 12.9 G/DL (ref 12–16)
IMM GRANULOCYTES # BLD AUTO: 0.04 X10(3) UL (ref 0–1)
IMM GRANULOCYTES NFR BLD: 0.4 %
LYMPHOCYTES # BLD AUTO: 2.41 X10(3) UL (ref 1–4)
LYMPHOCYTES NFR BLD AUTO: 24.2 %
MCH RBC QN AUTO: 28.2 PG (ref 26–34)
MCHC RBC AUTO-ENTMCNC: 32.6 G/DL (ref 31–37)
MCV RBC AUTO: 86.5 FL (ref 80–100)
MONOCYTES # BLD AUTO: 0.77 X10(3) UL (ref 0.1–1)
MONOCYTES NFR BLD AUTO: 7.7 %
NEUTROPHILS # BLD AUTO: 6.49 X10 (3) UL (ref 1.5–7.7)
NEUTROPHILS # BLD AUTO: 6.49 X10(3) UL (ref 1.5–7.7)
NEUTROPHILS NFR BLD AUTO: 65.1 %
OSMOLALITY SERPL CALC.SUM OF ELEC: 295 MOSM/KG (ref 275–295)
PATIENT FASTING Y/N/NP: YES
PLATELET # BLD AUTO: 242 10(3)UL (ref 150–450)
PLATELET # BLD: 242 K/MCL
POTASSIUM SERPL-SCNC: 4 MMOL/L
POTASSIUM SERPL-SCNC: 4 MMOL/L (ref 3.5–5.1)
RBC # BLD AUTO: 4.58 X10(6)UL (ref 3.8–5.3)
RBC # BLD: 4.58 10*6/UL
SODIUM SERPL-SCNC: 140 MMOL/L
SODIUM SERPL-SCNC: 140 MMOL/L (ref 136–145)
WBC # BLD AUTO: 10 X10(3) UL (ref 4–11)
WBC # BLD: 10 K/MCL

## 2020-07-06 PROCEDURE — 80048 BASIC METABOLIC PNL TOTAL CA: CPT

## 2020-07-06 PROCEDURE — 71046 X-RAY EXAM CHEST 2 VIEWS: CPT | Performed by: INTERNAL MEDICINE

## 2020-07-06 PROCEDURE — 85025 COMPLETE CBC W/AUTO DIFF WBC: CPT

## 2020-07-06 PROCEDURE — 36415 COLL VENOUS BLD VENIPUNCTURE: CPT

## 2020-07-07 ENCOUNTER — CLINICAL ABSTRACT (OUTPATIENT)
Dept: CARDIOLOGY | Age: 65
End: 2020-07-07

## 2020-07-08 ENCOUNTER — ADVANCED DIRECTIVES (OUTPATIENT)
Dept: CARDIOLOGY | Age: 65
End: 2020-07-08

## 2020-07-10 ENCOUNTER — LAB ENCOUNTER (OUTPATIENT)
Dept: LAB | Facility: HOSPITAL | Age: 65
End: 2020-07-10
Attending: INTERNAL MEDICINE
Payer: MEDICARE

## 2020-07-10 DIAGNOSIS — Z01.812 PRE-PROCEDURE LAB EXAM: Primary | ICD-10-CM

## 2020-07-10 DIAGNOSIS — Z01.818 PREOP TESTING: ICD-10-CM

## 2020-07-10 RX ORDER — SPIRONOLACTONE 25 MG/1
25 TABLET ORAL DAILY
COMMUNITY

## 2020-07-10 RX ORDER — VALSARTAN 40 MG/1
20 TABLET ORAL 2 TIMES DAILY
COMMUNITY

## 2020-07-11 LAB — SARS-COV-2 RNA RESP QL NAA+PROBE: NOT DETECTED

## 2020-07-13 ENCOUNTER — HOSPITAL ENCOUNTER (OUTPATIENT)
Dept: INTERVENTIONAL RADIOLOGY/VASCULAR | Facility: HOSPITAL | Age: 65
Discharge: HOME | End: 2020-07-14
Attending: INTERNAL MEDICINE | Admitting: INTERNAL MEDICINE
Payer: MEDICARE

## 2020-07-13 ENCOUNTER — APPOINTMENT (OUTPATIENT)
Dept: GENERAL RADIOLOGY | Facility: HOSPITAL | Age: 65
End: 2020-07-13
Attending: INTERNAL MEDICINE
Payer: MEDICARE

## 2020-07-13 ENCOUNTER — TELEPHONE (OUTPATIENT)
Dept: ENDOCRINOLOGY CLINIC | Facility: CLINIC | Age: 65
End: 2020-07-13

## 2020-07-13 DIAGNOSIS — Z01.818 PREOP TESTING: Primary | ICD-10-CM

## 2020-07-13 DIAGNOSIS — I50.9 HEART FAILURE (HCC): ICD-10-CM

## 2020-07-13 LAB
GLUCOSE BLDC GLUCOMTR-MCNC: 146 MG/DL (ref 70–99)
GLUCOSE BLDC GLUCOMTR-MCNC: 174 MG/DL (ref 70–99)
GLUCOSE BLDC GLUCOMTR-MCNC: 203 MG/DL (ref 70–99)
MRSA DNA SPEC QL NAA+PROBE: NEGATIVE

## 2020-07-13 PROCEDURE — 99152 MOD SED SAME PHYS/QHP 5/>YRS: CPT | Performed by: INTERNAL MEDICINE

## 2020-07-13 PROCEDURE — 33249 INSJ/RPLCMT DEFIB W/LEAD(S): CPT

## 2020-07-13 PROCEDURE — 93641 EP EVL 1/2CHMB PAC CVDFB TST: CPT | Performed by: INTERNAL MEDICINE

## 2020-07-13 PROCEDURE — 82962 GLUCOSE BLOOD TEST: CPT

## 2020-07-13 PROCEDURE — 02H63KZ INSERTION OF DEFIBRILLATOR LEAD INTO RIGHT ATRIUM, PERCUTANEOUS APPROACH: ICD-10-PCS | Performed by: INTERNAL MEDICINE

## 2020-07-13 PROCEDURE — 4B02XTZ MEASUREMENT OF CARDIAC DEFIBRILLATOR, EXTERNAL APPROACH: ICD-10-PCS | Performed by: INTERNAL MEDICINE

## 2020-07-13 PROCEDURE — 93641 EP EVL 1/2CHMB PAC CVDFB TST: CPT

## 2020-07-13 PROCEDURE — 87641 MR-STAPH DNA AMP PROBE: CPT | Performed by: INTERNAL MEDICINE

## 2020-07-13 PROCEDURE — 33249 INSJ/RPLCMT DEFIB W/LEAD(S): CPT | Performed by: INTERNAL MEDICINE

## 2020-07-13 PROCEDURE — 99153 MOD SED SAME PHYS/QHP EA: CPT

## 2020-07-13 PROCEDURE — 36415 COLL VENOUS BLD VENIPUNCTURE: CPT

## 2020-07-13 PROCEDURE — 71045 X-RAY EXAM CHEST 1 VIEW: CPT | Performed by: INTERNAL MEDICINE

## 2020-07-13 PROCEDURE — 99152 MOD SED SAME PHYS/QHP 5/>YRS: CPT

## 2020-07-13 PROCEDURE — 02HK3KZ INSERTION OF DEFIBRILLATOR LEAD INTO RIGHT VENTRICLE, PERCUTANEOUS APPROACH: ICD-10-PCS | Performed by: INTERNAL MEDICINE

## 2020-07-13 PROCEDURE — 0JH608Z INSERTION OF DEFIBRILLATOR GENERATOR INTO CHEST SUBCUTANEOUS TISSUE AND FASCIA, OPEN APPROACH: ICD-10-PCS | Performed by: INTERNAL MEDICINE

## 2020-07-13 RX ORDER — CEFAZOLIN SODIUM/WATER 2 G/20 ML
SYRINGE (ML) INTRAVENOUS
Status: COMPLETED
Start: 2020-07-13 | End: 2020-07-13

## 2020-07-13 RX ORDER — ARIPIPRAZOLE 2 MG/1
2 TABLET ORAL NIGHTLY
Status: DISCONTINUED | OUTPATIENT
Start: 2020-07-13 | End: 2020-07-14

## 2020-07-13 RX ORDER — LOSARTAN POTASSIUM 50 MG/1
50 TABLET ORAL DAILY
Status: DISCONTINUED | OUTPATIENT
Start: 2020-07-14 | End: 2020-07-14

## 2020-07-13 RX ORDER — SODIUM CHLORIDE 9 MG/ML
INJECTION, SOLUTION INTRAVENOUS
Status: ACTIVE | OUTPATIENT
Start: 2020-07-13 | End: 2020-07-13

## 2020-07-13 RX ORDER — ESCITALOPRAM OXALATE 5 MG/1
5 TABLET ORAL NIGHTLY
Status: DISCONTINUED | OUTPATIENT
Start: 2020-07-13 | End: 2020-07-14

## 2020-07-13 RX ORDER — DEXTROSE MONOHYDRATE 25 G/50ML
50 INJECTION, SOLUTION INTRAVENOUS
Status: DISCONTINUED | OUTPATIENT
Start: 2020-07-13 | End: 2020-07-14

## 2020-07-13 RX ORDER — METOPROLOL SUCCINATE 50 MG/1
50 TABLET, EXTENDED RELEASE ORAL
Status: DISCONTINUED | OUTPATIENT
Start: 2020-07-14 | End: 2020-07-14

## 2020-07-13 RX ORDER — SPIRONOLACTONE 25 MG/1
25 TABLET ORAL DAILY
Status: DISCONTINUED | OUTPATIENT
Start: 2020-07-13 | End: 2020-07-14

## 2020-07-13 RX ORDER — LIDOCAINE HYDROCHLORIDE AND EPINEPHRINE 10; 10 MG/ML; UG/ML
INJECTION, SOLUTION INFILTRATION; PERINEURAL
Status: COMPLETED
Start: 2020-07-13 | End: 2020-07-13

## 2020-07-13 RX ORDER — ONDANSETRON 2 MG/ML
4 INJECTION INTRAMUSCULAR; INTRAVENOUS EVERY 6 HOURS PRN
Status: DISCONTINUED | OUTPATIENT
Start: 2020-07-13 | End: 2020-07-14

## 2020-07-13 RX ORDER — ASPIRIN 81 MG/1
81 TABLET ORAL DAILY
Status: DISCONTINUED | OUTPATIENT
Start: 2020-07-14 | End: 2020-07-14

## 2020-07-13 RX ORDER — MIDAZOLAM HYDROCHLORIDE 1 MG/ML
INJECTION INTRAMUSCULAR; INTRAVENOUS
Status: COMPLETED
Start: 2020-07-13 | End: 2020-07-13

## 2020-07-13 RX ORDER — GLIPIZIDE 5 MG/1
2.5 TABLET ORAL
Status: DISCONTINUED | OUTPATIENT
Start: 2020-07-13 | End: 2020-07-14

## 2020-07-13 RX ORDER — ATORVASTATIN CALCIUM 40 MG/1
80 TABLET, FILM COATED ORAL NIGHTLY
Status: DISCONTINUED | OUTPATIENT
Start: 2020-07-13 | End: 2020-07-14

## 2020-07-13 RX ORDER — BACITRACIN 50000 [USP'U]/1
INJECTION, POWDER, LYOPHILIZED, FOR SOLUTION INTRAMUSCULAR
Status: COMPLETED
Start: 2020-07-13 | End: 2020-07-13

## 2020-07-13 RX ORDER — CEFAZOLIN SODIUM/WATER 2 G/20 ML
2 SYRINGE (ML) INTRAVENOUS EVERY 8 HOURS
Status: COMPLETED | OUTPATIENT
Start: 2020-07-13 | End: 2020-07-14

## 2020-07-13 RX ADMIN — CEFAZOLIN SODIUM/WATER 2 G: 2 G/20 ML SYRINGE (ML) INTRAVENOUS at 18:00:00

## 2020-07-13 RX ADMIN — GLIPIZIDE 2.5 MG: 5 TABLET ORAL at 18:00:00

## 2020-07-13 NOTE — PLAN OF CARE
Patient received from cath lab post dual chamber ICD pacemaker placement. Denies any pain. Plan for a chest x ray tomorrow morning and to be discharged home.

## 2020-07-13 NOTE — PROCEDURES
OPERATION(S) PERFORMED:   1. Dual chamber ICD implant. MDT   2. Chest fluoroscopy. 3. ICD testing.      : Cassia Barrow MD    INDICATION:  CM, EF <35%  NYHA funcational class: 2  ICD indication: Primary  Pacing indicaiton: SSS  ICD consult included muscle with ethibond suture. The incision was closed in 2 layers using 0 and 4-0 V lock Vicryl. Steri-Strips were applied followed by a sterile dressing. The left arm was placed in a sling and the patient was transported to telemetry in stable condition.

## 2020-07-13 NOTE — TELEPHONE ENCOUNTER
Blood sugar readings reviewed.         Fasting  B-dinner   6/7   183  6/8 158     6/9   148  6/10 159   6/11 114  133  6/12 130  6/13   178  6/14 120  6/15   174  6/16 114  6/17   150  6/18 144  6/19   104  6/20 112    6/21   170  6/22 108  6/23   148  6/24

## 2020-07-13 NOTE — INTERVAL H&P NOTE
Pre-op Diagnosis: * No pre-op diagnosis entered *    The above referenced H&P was reviewed by Jan Chase MD on 7/13/2020, the patient was examined and no significant changes have occurred in the patient's condition since the H&P was performed.   I discusse

## 2020-07-13 NOTE — PROGRESS NOTES
NYHA funcational class: 2  ICD indication: Primary  Pacing indicaiton: SSS  ICD consult included physician and patient participation using the [place] Shared Decision making tool via printout or video.

## 2020-07-13 NOTE — PROGRESS NOTES
Procedure hand off report given to AMALIA CORONEL [facility], RN. Procedural access site is dry and intact with no signs and symptoms of bleeding and hematoma. Dr Figueroa Bauer came to see pt /family at bedside post procedure. Pt is generally stable.

## 2020-07-14 ENCOUNTER — APPOINTMENT (OUTPATIENT)
Dept: GENERAL RADIOLOGY | Facility: HOSPITAL | Age: 65
End: 2020-07-14
Attending: INTERNAL MEDICINE
Payer: MEDICARE

## 2020-07-14 VITALS
WEIGHT: 115 LBS | BODY MASS INDEX: 22 KG/M2 | OXYGEN SATURATION: 98 % | DIASTOLIC BLOOD PRESSURE: 70 MMHG | TEMPERATURE: 99 F | HEART RATE: 61 BPM | RESPIRATION RATE: 16 BRPM | SYSTOLIC BLOOD PRESSURE: 136 MMHG

## 2020-07-14 DIAGNOSIS — I25.10 CORONARY ARTERY DISEASE INVOLVING NATIVE CORONARY ARTERY OF NATIVE HEART WITHOUT ANGINA PECTORIS: ICD-10-CM

## 2020-07-14 DIAGNOSIS — I25.5 ISCHEMIC CARDIOMYOPATHY: ICD-10-CM

## 2020-07-14 DIAGNOSIS — I50.20 SYSTOLIC HEART FAILURE, UNSPECIFIED HF CHRONICITY (CMD): ICD-10-CM

## 2020-07-14 LAB — GLUCOSE BLDC GLUCOMTR-MCNC: 197 MG/DL (ref 70–99)

## 2020-07-14 PROCEDURE — X1094 XR CHEST PA AND LATERAL 2 VIEWS: HCPCS | Performed by: INTERNAL MEDICINE

## 2020-07-14 PROCEDURE — 99214 OFFICE O/P EST MOD 30 MIN: CPT | Performed by: NURSE PRACTITIONER

## 2020-07-14 PROCEDURE — 82962 GLUCOSE BLOOD TEST: CPT

## 2020-07-14 PROCEDURE — 71046 X-RAY EXAM CHEST 2 VIEWS: CPT | Performed by: INTERNAL MEDICINE

## 2020-07-14 RX ADMIN — ASPIRIN 81 MG: 81 TABLET ORAL at 08:43:00

## 2020-07-14 RX ADMIN — METOPROLOL SUCCINATE 50 MG: 50 TABLET, EXTENDED RELEASE ORAL at 08:43:00

## 2020-07-14 RX ADMIN — CEFAZOLIN SODIUM/WATER 2 G: 2 G/20 ML SYRINGE (ML) INTRAVENOUS at 02:51:00

## 2020-07-14 RX ADMIN — SPIRONOLACTONE 25 MG: 25 TABLET ORAL at 08:43:00

## 2020-07-14 RX ADMIN — GLIPIZIDE 2.5 MG: 5 TABLET ORAL at 08:42:00

## 2020-07-14 RX ADMIN — LOSARTAN POTASSIUM 50 MG: 50 TABLET ORAL at 08:43:00

## 2020-07-14 NOTE — DISCHARGE PLANNING
Plan for patient to be discharged to home with  today. [employer] interrogation cleared and chest x ray normal. [employer] discussed pacemaker booklet to  over the phone.  Follow up appointment with [facility] APN for pacemaker site check made by sched

## 2020-07-14 NOTE — PROGRESS NOTES
[facility]OSP - [facility]    Progress Note    Ruben Lawson Patient Status:  Outpatient in a Bed    1955 MRN [id number]   Location 1 [facility] Wagner Attending Javon Chou MD   Hosp Day # 0 PCP Gabby Juan DO 02/05/2020    ESRML 28 02/06/2020    MG 1.9 02/17/2020    TROP 10.800 (HH) 02/07/2020    B12 257 02/05/2020       Xr Chest Pa + Lat Chest (cpt=71046)    Result Date: 7/14/2020  CONCLUSION:   Pacemaker in unchanged position without evidence for pneumothorax

## 2020-07-15 NOTE — TELEPHONE ENCOUNTER
Called and spoke to janes () and informed of below msg. Agreeable to starting invokana 100mg once daily. New prescription sent to pharmacy.  Jailyn Key instructed that if he is not able to  prescription due to cost, then he should medardo

## 2020-07-21 ENCOUNTER — TELEPHONE (OUTPATIENT)
Dept: ENDOCRINOLOGY CLINIC | Facility: CLINIC | Age: 65
End: 2020-07-21

## 2020-07-21 NOTE — TELEPHONE ENCOUNTER
Pts  states that he is not able to find Valsartan anywhere. Can something else be substituted? He does not want an increase in copay. Please call.         Current Outpatient Medications:     •  valsartan 40 MG Oral Tab, Take 20 mg by mouth 2 (two)

## 2020-07-22 ENCOUNTER — TELEPHONE (OUTPATIENT)
Dept: CARDIOLOGY | Age: 65
End: 2020-07-22

## 2020-07-22 RX ORDER — LOSARTAN POTASSIUM 25 MG/1
25 TABLET ORAL DAILY
Qty: 90 TABLET | Refills: 1 | Status: SHIPPED | OUTPATIENT
Start: 2020-07-22 | End: 2021-01-04

## 2020-07-22 NOTE — TELEPHONE ENCOUNTER
Pt does not see us here at [facility], she is at [facility] will forward Rx

## 2020-07-23 ENCOUNTER — ANCILLARY PROCEDURE (OUTPATIENT)
Dept: CARDIOLOGY | Age: 65
End: 2020-07-23
Attending: INTERNAL MEDICINE

## 2020-07-23 VITALS — HEART RATE: 62 BPM | DIASTOLIC BLOOD PRESSURE: 60 MMHG | RESPIRATION RATE: 12 BRPM | SYSTOLIC BLOOD PRESSURE: 86 MMHG

## 2020-07-23 DIAGNOSIS — Z95.810 ICD (IMPLANTABLE CARDIOVERTER-DEFIBRILLATOR) IN PLACE: Primary | ICD-10-CM

## 2020-07-23 PROCEDURE — 93289 INTERROG DEVICE EVAL HEART: CPT | Performed by: INTERNAL MEDICINE

## 2020-07-25 ENCOUNTER — MED REC SCAN ONLY (OUTPATIENT)
Dept: FAMILY MEDICINE CLINIC | Facility: CLINIC | Age: 65
End: 2020-07-25

## 2020-08-03 NOTE — TELEPHONE ENCOUNTER
Rec'd letter from home from pt and  that Oliverpatrizia Patel is very costly ($028.27) for 90 day. Pt will take for 90 days but asking for long term alternative.      Letter placed on APN's desk - please advise

## 2020-08-03 NOTE — TELEPHONE ENCOUNTER
Chano's letter read and noted. I am sorry to hear that he paid the high cost copay for this medicine. Since he already picked this medicine lets plan on using it for now and lets see how Yessy's blood sugar readings will be during the regimen.

## 2020-08-04 NOTE — TELEPHONE ENCOUNTER
Spoke with pt , Jose Luis Gonzáles. Agreeable to staying with Invokana and calling next week with BG readings. No further questions at this time.

## 2020-08-14 ENCOUNTER — OFFICE VISIT (OUTPATIENT)
Dept: ENDOCRINOLOGY CLINIC | Facility: CLINIC | Age: 65
End: 2020-08-14
Payer: MEDICARE

## 2020-08-14 VITALS
HEART RATE: 79 BPM | DIASTOLIC BLOOD PRESSURE: 67 MMHG | BODY MASS INDEX: 21 KG/M2 | WEIGHT: 107.19 LBS | SYSTOLIC BLOOD PRESSURE: 102 MMHG

## 2020-08-14 DIAGNOSIS — E11.9 TYPE 2 DIABETES MELLITUS WITHOUT COMPLICATION, WITH LONG-TERM CURRENT USE OF INSULIN (HCC): Primary | ICD-10-CM

## 2020-08-14 DIAGNOSIS — Z79.4 TYPE 2 DIABETES MELLITUS WITHOUT COMPLICATION, WITH LONG-TERM CURRENT USE OF INSULIN (HCC): Primary | ICD-10-CM

## 2020-08-14 LAB
CARTRIDGE LOT#: ABNORMAL NUMERIC
GLUCOSE BLOOD: 144
HEMOGLOBIN A1C: 8.6 % (ref 4.3–5.6)
TEST STRIP LOT #: NORMAL NUMERIC

## 2020-08-14 PROCEDURE — 83036 HEMOGLOBIN GLYCOSYLATED A1C: CPT | Performed by: NURSE PRACTITIONER

## 2020-08-14 PROCEDURE — 82962 GLUCOSE BLOOD TEST: CPT | Performed by: NURSE PRACTITIONER

## 2020-08-14 PROCEDURE — 99213 OFFICE O/P EST LOW 20 MIN: CPT | Performed by: NURSE PRACTITIONER

## 2020-08-14 PROCEDURE — 36416 COLLJ CAPILLARY BLOOD SPEC: CPT | Performed by: NURSE PRACTITIONER

## 2020-08-14 NOTE — PATIENT INSTRUCTIONS
Your A1C: 8.6% today --> increase from 7.9% on 5/14/2020    -Lets try to be more careful with carbohydrates at meal times.  Limit carbs to 45gm per meal  -avoid snacking in between meals   -call or send me your blood sugar readings in 2-3 weeks for review

## 2020-08-14 NOTE — PROGRESS NOTES
Name: Briseida Gordon  Date: 8/14/2020    Referring Physician: No ref. provider found    CHIEF COMPLAINT   No chief complaint on file.     HISTORY OF PRESENT ILLNESS   Briseida Gordon is a 72year old female who presents for follow up on diabetes ronan on statin   Coronary Artery Disease:  Yes -STEMI  Cerebrovascular Disease: no   DIETARY COMPLIANCE:  Fair; has been eating out more frequent; eats 3 meals per day.   -> ice cream once in a while   ->no soda or fruit juices - was drinking 4 cases/month  ->d skin daily. , Disp: 9 mL, Rfl: 0  •  OneTouch Delica Lancets 58G Does not apply Misc, Use to check blood sugars twice a day., Disp: 200 each, Rfl: 1  •  Glucose Blood (ONETOUCH ULTRA BLUE) In Vitro Strip, Use to check blood sugars twice a day., Disp: 200 st affective ([facility] [address]    • Breast cyst 1977   • Cataract 2012   • Congestive heart disease ([facility] [address]    • COPD (chronic obstructive pulmonary disease) (HCC)    • Cup to disc asymmetry 2012    increased C/D ratio   • CVA (cerebral vascular accident) [facility])    • Hemorr dependence should have less-stringent glycemic goals. A1C target goal: <8.0%; fasting blood sugar readings  and bedtime blood sugar readings 100-180. 1. Type 2 Diabetes Mellitus, without complications   -LAB DATA  HbA,C: 8.6% today--> increase from risks and benefits of my recommendations were discussed with the patient today. Questions were also answered to the best of my knowledge. Patient verbalizes understanding of these issues and agrees to the plan.     8/14/2020  LACEY Sommers

## 2020-08-25 RX ORDER — ATORVASTATIN CALCIUM 80 MG/1
80 TABLET, FILM COATED ORAL DAILY
Qty: 90 TABLET | Refills: 3 | Status: SHIPPED | OUTPATIENT
Start: 2020-08-25 | End: 2021-06-01 | Stop reason: SDUPTHER

## 2020-09-04 ENCOUNTER — TELEPHONE (OUTPATIENT)
Dept: ENDOCRINOLOGY CLINIC | Facility: CLINIC | Age: 65
End: 2020-09-04

## 2020-09-04 NOTE — TELEPHONE ENCOUNTER
Patient's spouse dropped off a note and glucose readings for Gabby Bodily. Placed in provider's folder.

## 2020-09-04 NOTE — TELEPHONE ENCOUNTER
Blood glucose readings reviewed and overall they are at goal.     Pamela Sin () states in the note that they have 38 doses left of invokana, in which will last them until 10/12.  He would like to know if they can be switched to glipizide after they run

## 2020-09-09 NOTE — TELEPHONE ENCOUNTER
Spoke with patient's  and relayed APN's message as outlined below. He shared below readings and this was discussed with APN.   Patient is to switch to glipizide 2.5 mg BID once invokana is done; to call one week to give BG update after switching to

## 2020-10-12 RX ORDER — ATORVASTATIN CALCIUM 80 MG/1
TABLET, FILM COATED ORAL
Qty: 90 TABLET | Refills: 0 | OUTPATIENT
Start: 2020-10-12

## 2020-10-12 RX ORDER — METOPROLOL SUCCINATE 50 MG/1
TABLET, EXTENDED RELEASE ORAL
Qty: 90 TABLET | Refills: 0 | OUTPATIENT
Start: 2020-10-12

## 2020-10-15 ENCOUNTER — ANCILLARY PROCEDURE (OUTPATIENT)
Dept: CARDIOLOGY | Age: 65
End: 2020-10-15
Attending: INTERNAL MEDICINE

## 2020-10-15 VITALS
WEIGHT: 106 LBS | SYSTOLIC BLOOD PRESSURE: 102 MMHG | HEART RATE: 78 BPM | HEIGHT: 60 IN | BODY MASS INDEX: 20.81 KG/M2 | DIASTOLIC BLOOD PRESSURE: 58 MMHG

## 2020-10-15 DIAGNOSIS — Z95.810 ICD (IMPLANTABLE CARDIOVERTER-DEFIBRILLATOR) IN PLACE: ICD-10-CM

## 2020-10-15 PROCEDURE — 93289 INTERROG DEVICE EVAL HEART: CPT | Performed by: INTERNAL MEDICINE

## 2020-10-15 PROCEDURE — 93290 INTERROG DEV EVAL ICPMS IP: CPT | Performed by: INTERNAL MEDICINE

## 2020-10-21 ENCOUNTER — TELEPHONE (OUTPATIENT)
Dept: ENDOCRINOLOGY CLINIC | Facility: CLINIC | Age: 65
End: 2020-10-21

## 2020-10-21 NOTE — TELEPHONE ENCOUNTER
BG reading received via mail and reviewed.      All readings are at goal.       Per note, patient has finished taking Invokana (took last pill on 10/13/20), she now has transitioned to glipizide 2.5mg (1/2 tab) before breakfast and 2.5mg (1/2 tab) before [employer]

## 2020-10-26 ENCOUNTER — ANCILLARY PROCEDURE (OUTPATIENT)
Dept: CARDIOLOGY | Age: 65
End: 2020-10-26
Attending: INTERNAL MEDICINE

## 2020-10-26 DIAGNOSIS — Z95.0 CARDIAC PACEMAKER: ICD-10-CM

## 2020-10-27 ENCOUNTER — TELEPHONE (OUTPATIENT)
Dept: CARDIOLOGY | Age: 65
End: 2020-10-27

## 2020-11-02 RX ORDER — BLOOD SUGAR DIAGNOSTIC
1 STRIP MISCELLANEOUS 2 TIMES DAILY
Qty: 200 EACH | Refills: 0 | Status: SHIPPED | OUTPATIENT
Start: 2020-11-02 | End: 2021-11-04

## 2020-11-02 RX ORDER — LANCETS
EACH MISCELLANEOUS
Qty: 200 EACH | Refills: 0 | Status: SHIPPED | OUTPATIENT
Start: 2020-11-02

## 2020-11-04 RX ORDER — LANCETS 33 GAUGE
EACH MISCELLANEOUS
Qty: 200 EACH | Refills: 1 | Status: SHIPPED | OUTPATIENT
Start: 2020-11-04 | End: 2021-02-04

## 2020-11-12 RX ORDER — PEN NEEDLE, DIABETIC 32GX 5/32"
NEEDLE, DISPOSABLE MISCELLANEOUS
Qty: 100 EACH | Refills: 0 | Status: SHIPPED | OUTPATIENT
Start: 2020-11-12 | End: 2021-02-04

## 2020-11-20 ENCOUNTER — OFFICE VISIT (OUTPATIENT)
Dept: ENDOCRINOLOGY CLINIC | Facility: CLINIC | Age: 65
End: 2020-11-20
Payer: MEDICARE

## 2020-11-20 VITALS
DIASTOLIC BLOOD PRESSURE: 76 MMHG | BODY MASS INDEX: 22 KG/M2 | HEART RATE: 76 BPM | SYSTOLIC BLOOD PRESSURE: 122 MMHG | WEIGHT: 113.19 LBS

## 2020-11-20 DIAGNOSIS — Z79.4 TYPE 2 DIABETES MELLITUS WITHOUT COMPLICATION, WITH LONG-TERM CURRENT USE OF INSULIN (HCC): Primary | ICD-10-CM

## 2020-11-20 DIAGNOSIS — E11.9 TYPE 2 DIABETES MELLITUS WITHOUT COMPLICATION, WITH LONG-TERM CURRENT USE OF INSULIN (HCC): Primary | ICD-10-CM

## 2020-11-20 PROCEDURE — 82947 ASSAY GLUCOSE BLOOD QUANT: CPT | Performed by: NURSE PRACTITIONER

## 2020-11-20 PROCEDURE — 83036 HEMOGLOBIN GLYCOSYLATED A1C: CPT | Performed by: NURSE PRACTITIONER

## 2020-11-20 PROCEDURE — 99213 OFFICE O/P EST LOW 20 MIN: CPT | Performed by: NURSE PRACTITIONER

## 2020-11-20 PROCEDURE — 36416 COLLJ CAPILLARY BLOOD SPEC: CPT | Performed by: NURSE PRACTITIONER

## 2020-11-20 RX ORDER — GLIPIZIDE 5 MG/1
2.5 TABLET ORAL 2 TIMES DAILY
COMMUNITY
End: 2020-11-20

## 2020-11-20 RX ORDER — GLIPIZIDE 5 MG/1
2.5 TABLET ORAL
Qty: 90 TABLET | Refills: 0 | Status: SHIPPED | OUTPATIENT
Start: 2020-11-20 | End: 2021-02-18

## 2020-11-20 NOTE — PROGRESS NOTES
Name: Yana Page  Date: 11/20/2020    CHIEF COMPLAINT   No chief complaint on file. HISTORY OF PRESENT ILLNESS   Yana Page is a 72year old female who presents for follow up on diabetes management.  Patient is accompanied by her , months: yes-on  4/2020  History of Neuropathy: no   History of Nephropathy: no     ASSOCIATED COMPLICATIONS:   HTN: no   Hyperlipidemia: yes- on statin   Coronary Artery Disease:  Yes -STEMI  Cerebrovascular Disease: no   DIETARY COMPLIANCE:  Fair- admits 1  •  Glucose Blood (ONETOUCH ULTRA) In Vitro Strip, Test 2x daily, Disp: 200 each, Rfl: 1  •  Canagliflozin (INVOKANA) 100 MG Oral Tab, Take 100 mg by mouth every morning before breakfast., Disp: 90 tablet, Rfl: 0  •  spironolactone 25 MG Oral Tab, Take 2 • COPD (chronic obstructive pulmonary disease) (HCC)    • Cup to disc asymmetry 2012    increased C/D ratio   • CVA (cerebral vascular accident) ([address]    • Hemorrhoids 2011   • High blood pressure    • High cholesterol    • No diabetic retinopathy  uncontrolled   -LAB DATA  HbA,C:  8.0% today -->improved from 8.6% on 8/14/2020  a) Medications  -continue with Tresiba 10 units daily   -continue with glipizide 2.5mg twice daily      -discussed to be cautious and avoid granola bars/snacks during the day

## 2020-11-20 NOTE — PATIENT INSTRUCTIONS
A1C: 8.0% today --> improved from 8.6% on 8/14/2020  Blood glucose: 218 in clinic today         Medications:     -continue with Tresiba 10 units daily   -continue with glipizide 2.5mg twice daily

## 2020-11-27 ENCOUNTER — TELEPHONE (OUTPATIENT)
Dept: ENDOCRINOLOGY CLINIC | Facility: CLINIC | Age: 65
End: 2020-11-27

## 2020-11-27 NOTE — TELEPHONE ENCOUNTER
BG readings reviewed and they look stable. We will continue the same medication regimen at this time. Thank you!

## 2020-11-27 NOTE — TELEPHONE ENCOUNTER
Received BG log for review and placed on APN's desk (10/14/20-11/10/20). Patient was seen on 11/20/20.     Current regimen:   - tresiba 10 units daily  - glipizide 2.5 mg BID

## 2020-12-08 NOTE — TELEPHONE ENCOUNTER
Attempted to contact [place]:    No ring, line goes straight to busy signal. Second attempt.

## 2021-01-04 RX ORDER — LOSARTAN POTASSIUM 25 MG/1
25 TABLET ORAL DAILY
Qty: 90 TABLET | Refills: 1 | Status: SHIPPED | OUTPATIENT
Start: 2021-01-04 | End: 2021-06-24

## 2021-01-11 RX ORDER — BLOOD SUGAR DIAGNOSTIC
STRIP MISCELLANEOUS
Qty: 200 STRIP | Refills: 0 | Status: SHIPPED | OUTPATIENT
Start: 2021-01-11

## 2021-02-01 ENCOUNTER — ANCILLARY ORDERS (OUTPATIENT)
Dept: CARDIOLOGY | Age: 66
End: 2021-02-01

## 2021-02-01 ENCOUNTER — ANCILLARY PROCEDURE (OUTPATIENT)
Dept: CARDIOLOGY | Age: 66
End: 2021-02-01
Attending: INTERNAL MEDICINE

## 2021-02-01 DIAGNOSIS — Z95.810 ICD (IMPLANTABLE CARDIOVERTER-DEFIBRILLATOR) IN PLACE: ICD-10-CM

## 2021-02-01 DIAGNOSIS — Z23 NEED FOR VACCINATION: ICD-10-CM

## 2021-02-01 PROCEDURE — X1114 CARDIAC DEVICE HOME CHECK - REMOTE UNSCHEDULED: HCPCS | Performed by: INTERNAL MEDICINE

## 2021-02-02 PROCEDURE — 93295 DEV INTERROG REMOTE 1/2/MLT: CPT | Performed by: INTERNAL MEDICINE

## 2021-02-03 NOTE — TELEPHONE ENCOUNTER
Pharmacy refill request for;    •  BD PEN NEEDLE KRYSTAL U/F 32G X 4 MM Does not apply Misc, USE AS DIRECTED EVERY DAY, Disp: 100 each, Rfl: 0    •  ONETOUCH DELICA PLUS [id number] Does not apply Misc, CHECK BLOOD SUGARS TWICE DAILY, Disp: 200 each, Rfl: 1

## 2021-02-04 RX ORDER — INSULIN DEGLUDEC INJECTION 100 U/ML
10 INJECTION, SOLUTION SUBCUTANEOUS DAILY
Qty: 9 ML | Refills: 0 | Status: SHIPPED | OUTPATIENT
Start: 2021-02-04 | End: 2021-05-03

## 2021-02-04 RX ORDER — LANCETS 33 GAUGE
1 EACH MISCELLANEOUS 2 TIMES DAILY
Qty: 200 EACH | Refills: 1 | Status: SHIPPED | OUTPATIENT
Start: 2021-02-04 | End: 2021-09-29

## 2021-02-04 RX ORDER — PEN NEEDLE, DIABETIC 32GX 5/32"
1 NEEDLE, DISPOSABLE MISCELLANEOUS DAILY
Qty: 100 EACH | Refills: 0 | Status: SHIPPED | OUTPATIENT
Start: 2021-02-04 | End: 2021-05-05

## 2021-02-11 ENCOUNTER — TELEPHONE (OUTPATIENT)
Dept: ENDOCRINOLOGY CLINIC | Facility: CLINIC | Age: 66
End: 2021-02-11

## 2021-02-11 NOTE — TELEPHONE ENCOUNTER
Pt sent in a copy of her blood sugar readings from November 2020 to January 2021.  A copy has been placed on Ashtyn's desk and the originals have been sent to scanning

## 2021-02-16 NOTE — TELEPHONE ENCOUNTER
BG readings and stable at this time. Please instruct patient to continue with present medication regimen. Thank you!

## 2021-02-17 NOTE — TELEPHONE ENCOUNTER
Tried calling patient, no answer, and line went into a busy tone. No voicemail available. Will try again later.

## 2021-02-18 RX ORDER — GLIPIZIDE 5 MG/1
2.5 TABLET ORAL
Qty: 90 TABLET | Refills: 0 | Status: SHIPPED | OUTPATIENT
Start: 2021-02-18 | End: 2021-03-25

## 2021-02-18 NOTE — TELEPHONE ENCOUNTER
Pharmacy refill request for;    •  glipiZIDE 5 MG Oral Tab, Take 0.5 tablets (2.5 mg total) by mouth 2 (two) times daily before meals. , Disp: 90 tablet, Rfl: 0    Please follow up, thank you.

## 2021-02-23 RX ORDER — SPIRONOLACTONE 25 MG/1
25 TABLET ORAL DAILY
Qty: 90 TABLET | Refills: 0 | Status: SHIPPED | OUTPATIENT
Start: 2021-02-23 | End: 2021-06-01 | Stop reason: SDUPTHER

## 2021-02-23 NOTE — TELEPHONE ENCOUNTER
Called  Shabbir Wilson and relayed SANDRO's message as below. He complained about how he only got 2 boxes of 50's on patient's strips. Looked at the prescription on 1/11/21 and it states to test twice and qty 200.   Spoke to Vin Hamlin from [employer] and she cou

## 2021-03-25 ENCOUNTER — OFFICE VISIT (OUTPATIENT)
Dept: ENDOCRINOLOGY CLINIC | Facility: CLINIC | Age: 66
End: 2021-03-25
Payer: MEDICARE

## 2021-03-25 VITALS
SYSTOLIC BLOOD PRESSURE: 99 MMHG | BODY MASS INDEX: 24 KG/M2 | WEIGHT: 121 LBS | DIASTOLIC BLOOD PRESSURE: 71 MMHG | HEART RATE: 82 BPM

## 2021-03-25 DIAGNOSIS — E11.65 TYPE 2 DIABETES MELLITUS WITH HYPERGLYCEMIA, WITH LONG-TERM CURRENT USE OF INSULIN (HCC): Primary | ICD-10-CM

## 2021-03-25 DIAGNOSIS — Z79.4 TYPE 2 DIABETES MELLITUS WITH HYPERGLYCEMIA, WITH LONG-TERM CURRENT USE OF INSULIN (HCC): Primary | ICD-10-CM

## 2021-03-25 LAB
CARTRIDGE LOT#: ABNORMAL NUMERIC
GLUCOSE BLOOD: 284
HEMOGLOBIN A1C: 8.4 % (ref 4.3–5.6)
TEST STRIP LOT #: NORMAL NUMERIC

## 2021-03-25 PROCEDURE — 36416 COLLJ CAPILLARY BLOOD SPEC: CPT | Performed by: NURSE PRACTITIONER

## 2021-03-25 PROCEDURE — 82947 ASSAY GLUCOSE BLOOD QUANT: CPT | Performed by: NURSE PRACTITIONER

## 2021-03-25 PROCEDURE — 99214 OFFICE O/P EST MOD 30 MIN: CPT | Performed by: NURSE PRACTITIONER

## 2021-03-25 PROCEDURE — 83036 HEMOGLOBIN GLYCOSYLATED A1C: CPT | Performed by: NURSE PRACTITIONER

## 2021-03-25 RX ORDER — GLIPIZIDE 5 MG/1
TABLET ORAL
Qty: 135 TABLET | Refills: 0 | Status: SHIPPED | OUTPATIENT
Start: 2021-03-25 | End: 2021-07-07

## 2021-03-25 NOTE — PATIENT INSTRUCTIONS
A1C: 8.4% today -->increased from 8.0% on 11/20/2020  Blood glucose: 284 in clinic today       Medications:    -continue with Tresiba 10 units once daily   -increase glipizide 2.5-->5mg with breakfast   -continue with glipizide 2.5mg (0.5tablet) with dinne

## 2021-03-25 NOTE — PROGRESS NOTES
Name: Noble Toledo  Date: 3/25/2021    CHIEF COMPLAINT   No chief complaint on file. HISTORY OF PRESENT ILLNESS   Noble Toledo is a 77year old female who presents for follow up on diabetes management.  Patient is accompanied by her  clinic in [place]   History of Neuropathy: no   History of Nephropathy: no     ASSOCIATED COMPLICATIONS:   HTN: no   Hyperlipidemia: yes- on statin   Coronary Artery Disease:  Yes -STEMI  Cerebrovascular Disease: no     DIETARY COMPLIANCE:  Fair- eats 3 me Glucose Blood (CONTOUR NEXT TEST) In Vitro Strip, 1 each by Other route 2 (two) times daily. , Disp: 200 each, Rfl: 0  •  Microlet Lancets Does not apply Misc, Check blood sugar twice daily, Disp: 200 each, Rfl: 0  •  spironolactone 25 MG Oral Tab, Take 25 Running Out of Food in the Last Year:       Ran Out of Food in the Last Year:   Transportation Needs:       Lack of Transportation (Medical):       Lack of Transportation (Non-Medical):   Physical Activity:       Days of Exercise per Week:       Minutes of gain or loss    Labs: Pertinent labs reviewed    ASSESSMENT/PLAN:    -Reviewed with patient the pathogenesis of diabetes, clinical significance of A1c, and common complications such as: microvascular, macrovascular and diabetic ketoacidosis.  Patient verbal instructed to call sooner if they develop Blood glucose readings <75 and/or if they have readings persistently >200. The risks and benefits of my recommendations were discussed with the patient today.  Questions were also answered to the best of my know

## 2021-04-30 ENCOUNTER — TELEPHONE (OUTPATIENT)
Dept: ENDOCRINOLOGY CLINIC | Facility: CLINIC | Age: 66
End: 2021-04-30

## 2021-04-30 NOTE — TELEPHONE ENCOUNTER
Pharmacy refill request for:      •  Insulin Degludec (TRESIBA FLEXTOUCH) 100 UNIT/ML Subcutaneous Solution Pen-injector, Inject 10 Units into the skin daily. , Disp: 9 mL, Rfl: 0    •  Insulin Pen Needle (BD PEN NEEDLE KRYSTAL U/F) 32G X 4 MM Does not apply M

## 2021-05-05 RX ORDER — INSULIN DEGLUDEC INJECTION 100 U/ML
10 INJECTION, SOLUTION SUBCUTANEOUS DAILY
Qty: 15 ML | Refills: 1 | Status: SHIPPED | OUTPATIENT
Start: 2021-05-05 | End: 2021-07-12

## 2021-05-05 RX ORDER — BLOOD SUGAR DIAGNOSTIC
STRIP MISCELLANEOUS
Qty: 200 EACH | Refills: 1 | Status: SHIPPED | OUTPATIENT
Start: 2021-05-05 | End: 2021-07-29

## 2021-05-05 RX ORDER — PEN NEEDLE, DIABETIC 32GX 5/32"
1 NEEDLE, DISPOSABLE MISCELLANEOUS DAILY
Qty: 100 EACH | Refills: 1 | Status: SHIPPED | OUTPATIENT
Start: 2021-05-05 | End: 2021-07-29

## 2021-05-05 NOTE — TELEPHONE ENCOUNTER
Pts  states that pharmacy told him that there is supporting CMN documentation required. Please call.       Current Outpatient Medications:     •  Glucose Blood (ONETOUCH ULTRA BLUE) In Vitro Strip, Use to check blood sugars twice a day., Disp: 200 s

## 2021-05-06 ENCOUNTER — ANCILLARY PROCEDURE (OUTPATIENT)
Dept: CARDIOLOGY | Age: 66
End: 2021-05-06
Attending: INTERNAL MEDICINE

## 2021-05-06 ENCOUNTER — ANCILLARY ORDERS (OUTPATIENT)
Dept: CARDIOLOGY | Age: 66
End: 2021-05-06

## 2021-05-06 DIAGNOSIS — Z45.02 ENCOUNTER FOR ASSESSMENT OF IMPLANTABLE CARDIOVERTER-DEFIBRILLATOR (ICD): ICD-10-CM

## 2021-05-06 PROCEDURE — 93295 DEV INTERROG REMOTE 1/2/MLT: CPT | Performed by: INTERNAL MEDICINE

## 2021-05-06 PROCEDURE — X1114 CARDIAC DEVICE HOME CHECK - REMOTE UNSCHEDULED: HCPCS | Performed by: INTERNAL MEDICINE

## 2021-05-06 NOTE — TELEPHONE ENCOUNTER
Received CMN form and placed on Dr. Ros Lakhani desk for review and signature. APN is on leave but can't sign the CMN anyway being she's Medicare.  Dr. Deacon Vergara consulted the patient inpatient on 2/11/20

## 2021-05-07 NOTE — TELEPHONE ENCOUNTER
Signed CMN form faxed to [phone] [facility] and retail store [phone].

## 2021-05-28 ENCOUNTER — TELEPHONE (OUTPATIENT)
Dept: ENDOCRINOLOGY CLINIC | Facility: CLINIC | Age: 66
End: 2021-05-28

## 2021-05-28 NOTE — TELEPHONE ENCOUNTER
Patient dropped off letter explaining that she has been doing what was recommended at her last visit. \"increase glipizide 2.5-->5mg with breakfast. continue with glipizide 2.5mg (0.5tablet) with dinner\".    Patient also attached BG log with dates ranges

## 2021-06-01 ENCOUNTER — LAB ENCOUNTER (OUTPATIENT)
Dept: LAB | Age: 66
End: 2021-06-01
Attending: NURSE PRACTITIONER
Payer: MEDICARE

## 2021-06-01 ENCOUNTER — TELEPHONE (OUTPATIENT)
Dept: CARDIOLOGY | Age: 66
End: 2021-06-01

## 2021-06-01 DIAGNOSIS — E11.65 TYPE 2 DIABETES MELLITUS WITH HYPERGLYCEMIA, WITH LONG-TERM CURRENT USE OF INSULIN (HCC): ICD-10-CM

## 2021-06-01 DIAGNOSIS — Z79.4 TYPE 2 DIABETES MELLITUS WITH HYPERGLYCEMIA, WITH LONG-TERM CURRENT USE OF INSULIN (HCC): ICD-10-CM

## 2021-06-01 DIAGNOSIS — Z79.4 TYPE 2 DIABETES MELLITUS WITHOUT COMPLICATION, WITH LONG-TERM CURRENT USE OF INSULIN (HCC): ICD-10-CM

## 2021-06-01 DIAGNOSIS — E11.9 TYPE 2 DIABETES MELLITUS WITHOUT COMPLICATION, WITH LONG-TERM CURRENT USE OF INSULIN (HCC): ICD-10-CM

## 2021-06-01 PROCEDURE — 82570 ASSAY OF URINE CREATININE: CPT

## 2021-06-01 PROCEDURE — 36415 COLL VENOUS BLD VENIPUNCTURE: CPT

## 2021-06-01 PROCEDURE — 80061 LIPID PANEL: CPT

## 2021-06-01 PROCEDURE — 80053 COMPREHEN METABOLIC PANEL: CPT

## 2021-06-01 PROCEDURE — 82043 UR ALBUMIN QUANTITATIVE: CPT

## 2021-06-01 RX ORDER — ATORVASTATIN CALCIUM 80 MG/1
80 TABLET, FILM COATED ORAL DAILY
Qty: 90 TABLET | Refills: 3 | Status: CANCELLED | OUTPATIENT
Start: 2021-06-01

## 2021-06-01 RX ORDER — ATORVASTATIN CALCIUM 80 MG/1
80 TABLET, FILM COATED ORAL DAILY
Qty: 90 TABLET | OUTPATIENT
Start: 2021-06-01

## 2021-06-01 RX ORDER — ATORVASTATIN CALCIUM 80 MG/1
80 TABLET, FILM COATED ORAL DAILY
Qty: 30 TABLET | Refills: 1 | Status: SHIPPED | OUTPATIENT
Start: 2021-06-01

## 2021-06-01 RX ORDER — METOPROLOL SUCCINATE 50 MG/1
50 TABLET, EXTENDED RELEASE ORAL DAILY
Qty: 30 TABLET | Refills: 1 | Status: SHIPPED | OUTPATIENT
Start: 2021-06-01 | End: 2021-06-24

## 2021-06-01 RX ORDER — METOPROLOL SUCCINATE 50 MG/1
TABLET, EXTENDED RELEASE ORAL
Qty: 90 TABLET | OUTPATIENT
Start: 2021-06-01

## 2021-06-01 RX ORDER — SPIRONOLACTONE 25 MG/1
25 TABLET ORAL DAILY
Qty: 30 TABLET | Refills: 1 | Status: SHIPPED | OUTPATIENT
Start: 2021-06-01 | End: 2021-06-24

## 2021-06-01 RX ORDER — SPIRONOLACTONE 25 MG/1
TABLET ORAL
Qty: 90 TABLET | OUTPATIENT
Start: 2021-06-01

## 2021-06-01 NOTE — TELEPHONE ENCOUNTER
Hi!  Can we please call this patient and reassure her that we have a lot of room to go up on the dose of glipizide and it is a good medication.      Let us change the dose to the followin whole pill before breakfast (5mg) and   1 whole pill before [facility]

## 2021-06-02 NOTE — TELEPHONE ENCOUNTER
Spoke to patient to relay message below - patient stated understanding and will take 5mg glipizide before breakfast and 5mg glipizide before dinner daily  Patient stated understanding to send BG readings in 2 weeks  Patient denied further questions at this

## 2021-06-09 ENCOUNTER — TELEPHONE (OUTPATIENT)
Dept: ENDOCRINOLOGY CLINIC | Facility: CLINIC | Age: 66
End: 2021-06-09

## 2021-06-09 NOTE — TELEPHONE ENCOUNTER
Please call patient - good news, lab results are stable and at goal.  Danelle salcedo can discuss more in depth at her visit in July. Thanks.

## 2021-06-11 ENCOUNTER — OFFICE VISIT (OUTPATIENT)
Dept: FAMILY MEDICINE CLINIC | Facility: CLINIC | Age: 66
End: 2021-06-11
Payer: MEDICARE

## 2021-06-11 VITALS
TEMPERATURE: 98 F | SYSTOLIC BLOOD PRESSURE: 93 MMHG | HEART RATE: 82 BPM | WEIGHT: 122 LBS | HEIGHT: 60 IN | BODY MASS INDEX: 23.95 KG/M2 | DIASTOLIC BLOOD PRESSURE: 66 MMHG

## 2021-06-11 DIAGNOSIS — H61.23 BILATERAL IMPACTED CERUMEN: ICD-10-CM

## 2021-06-11 DIAGNOSIS — H92.01 ACUTE OTALGIA, RIGHT: Primary | ICD-10-CM

## 2021-06-11 PROCEDURE — 99213 OFFICE O/P EST LOW 20 MIN: CPT | Performed by: FAMILY MEDICINE

## 2021-06-11 NOTE — PROGRESS NOTES
HPI/Subjective:   Patient ID: Brando Dela Cruz is a 77year old female. HPI  Patient presents with:  Ear Problem: right ear pain. Pt c/o hearing problems. History/Other:   Review of Systems   Constitutional: Negative.     HENT: Positive for ear ruth Tab Take 1 tablet (2 mg total) by mouth nightly. 30 tablet 0   • escitalopram 5 MG Oral Tab Take 1 tablet (5 mg total) by mouth nightly.  30 tablet 0   • FreeStyle System Does not apply Kit Contour NEXT meter kit 1 kit 0     Allergies:No Known Allergies

## 2021-06-14 ENCOUNTER — OFFICE VISIT (OUTPATIENT)
Dept: FAMILY MEDICINE CLINIC | Facility: CLINIC | Age: 66
End: 2021-06-14
Payer: MEDICARE

## 2021-06-14 VITALS
DIASTOLIC BLOOD PRESSURE: 61 MMHG | WEIGHT: 122 LBS | BODY MASS INDEX: 23.95 KG/M2 | SYSTOLIC BLOOD PRESSURE: 90 MMHG | HEIGHT: 60 IN | HEART RATE: 80 BPM

## 2021-06-14 DIAGNOSIS — H61.23 BILATERAL IMPACTED CERUMEN: Primary | ICD-10-CM

## 2021-06-14 PROCEDURE — 99213 OFFICE O/P EST LOW 20 MIN: CPT | Performed by: FAMILY MEDICINE

## 2021-06-14 RX ORDER — LOSARTAN POTASSIUM 25 MG/1
25 TABLET ORAL DAILY
COMMUNITY
Start: 2021-04-05

## 2021-06-14 RX ORDER — LATANOPROST 50 UG/ML
1 SOLUTION/ DROPS OPHTHALMIC NIGHTLY
COMMUNITY
Start: 2021-03-09

## 2021-06-14 NOTE — PROGRESS NOTES
HPI/Subjective:   Patient ID: Melody Guillen is a 77year old female. HPI  Bilateral cerumen impaction  History/Other:   Review of Systems   Constitutional: Negative. HENT: Positive for hearing loss.       Current Outpatient Medications   Medicati escitalopram 5 MG Oral Tab Take 1 tablet (5 mg total) by mouth nightly. 30 tablet 0   • FreeStyle System Does not apply Kit Contour NEXT meter kit 1 kit 0   • latanoprost 0.005 % Ophthalmic Solution Place 1 drop into both eyes nightly.      • Losartan Potas

## 2021-06-18 ENCOUNTER — OFFICE VISIT (OUTPATIENT)
Dept: FAMILY MEDICINE CLINIC | Facility: CLINIC | Age: 66
End: 2021-06-18
Payer: MEDICARE

## 2021-06-18 VITALS
SYSTOLIC BLOOD PRESSURE: 96 MMHG | WEIGHT: 122 LBS | DIASTOLIC BLOOD PRESSURE: 63 MMHG | HEIGHT: 60 IN | HEART RATE: 75 BPM | BODY MASS INDEX: 23.95 KG/M2

## 2021-06-18 DIAGNOSIS — R79.89 ELEVATED LIVER FUNCTION TESTS: ICD-10-CM

## 2021-06-18 DIAGNOSIS — I50.23 ACUTE ON CHRONIC HFREF (HEART FAILURE WITH REDUCED EJECTION FRACTION) (HCC): ICD-10-CM

## 2021-06-18 DIAGNOSIS — E78.00 PURE HYPERCHOLESTEROLEMIA: ICD-10-CM

## 2021-06-18 DIAGNOSIS — E11.65 TYPE 2 DIABETES MELLITUS WITH HYPERGLYCEMIA, WITH LONG-TERM CURRENT USE OF INSULIN (HCC): Primary | ICD-10-CM

## 2021-06-18 DIAGNOSIS — Z00.00 ENCOUNTER FOR ANNUAL HEALTH EXAMINATION: ICD-10-CM

## 2021-06-18 DIAGNOSIS — F41.1 GENERALIZED ANXIETY DISORDER: ICD-10-CM

## 2021-06-18 DIAGNOSIS — Z79.4 TYPE 2 DIABETES MELLITUS WITH HYPERGLYCEMIA, WITH LONG-TERM CURRENT USE OF INSULIN (HCC): Primary | ICD-10-CM

## 2021-06-18 DIAGNOSIS — Z95.810 PRESENCE OF CARDIAC DEFIBRILLATOR: ICD-10-CM

## 2021-06-18 DIAGNOSIS — I25.10 CORONARY ARTERY DISEASE INVOLVING NATIVE CORONARY ARTERY OF NATIVE HEART WITHOUT ANGINA PECTORIS: ICD-10-CM

## 2021-06-18 DIAGNOSIS — Z12.31 ENCOUNTER FOR SCREENING MAMMOGRAM FOR MALIGNANT NEOPLASM OF BREAST: ICD-10-CM

## 2021-06-18 DIAGNOSIS — F31.4 SEVERE BIPOLAR I DISORDER, MOST RECENT EPISODE DEPRESSED (HCC): ICD-10-CM

## 2021-06-18 PROBLEM — IMO0002 UNCONTROLLED TYPE 2 DIABETES MELLITUS: Status: RESOLVED | Noted: 2020-02-13 | Resolved: 2021-06-18

## 2021-06-18 PROBLEM — I21.3 ST ELEVATION MYOCARDIAL INFARCTION (STEMI), UNSPECIFIED ARTERY (HCC): Status: RESOLVED | Noted: 2020-02-04 | Resolved: 2021-06-18

## 2021-06-18 PROCEDURE — 90670 PCV13 VACCINE IM: CPT | Performed by: FAMILY MEDICINE

## 2021-06-18 PROCEDURE — G0009 ADMIN PNEUMOCOCCAL VACCINE: HCPCS | Performed by: FAMILY MEDICINE

## 2021-06-18 PROCEDURE — G0439 PPPS, SUBSEQ VISIT: HCPCS | Performed by: FAMILY MEDICINE

## 2021-06-24 RX ORDER — LOSARTAN POTASSIUM 25 MG/1
25 TABLET ORAL DAILY
Qty: 90 TABLET | Refills: 0 | Status: SHIPPED | OUTPATIENT
Start: 2021-06-24

## 2021-06-24 RX ORDER — METOPROLOL SUCCINATE 50 MG/1
TABLET, EXTENDED RELEASE ORAL
Qty: 90 TABLET | Refills: 0 | Status: SHIPPED | OUTPATIENT
Start: 2021-06-24

## 2021-06-24 RX ORDER — SPIRONOLACTONE 25 MG/1
TABLET ORAL
Qty: 90 TABLET | Refills: 0 | Status: SHIPPED | OUTPATIENT
Start: 2021-06-24

## 2021-06-29 ENCOUNTER — HOSPITAL ENCOUNTER (OUTPATIENT)
Dept: MAMMOGRAPHY | Age: 66
Discharge: HOME | End: 2021-06-29
Attending: FAMILY MEDICINE
Payer: MEDICARE

## 2021-06-29 DIAGNOSIS — Z12.31 ENCOUNTER FOR SCREENING MAMMOGRAM FOR MALIGNANT NEOPLASM OF BREAST: ICD-10-CM

## 2021-06-29 PROCEDURE — 77067 SCR MAMMO BI INCL CAD: CPT | Performed by: FAMILY MEDICINE

## 2021-06-29 PROCEDURE — 77063 BREAST TOMOSYNTHESIS BI: CPT | Performed by: FAMILY MEDICINE

## 2021-07-07 RX ORDER — GLIPIZIDE 5 MG/1
5 TABLET ORAL
Qty: 180 TABLET | Refills: 0 | Status: SHIPPED | OUTPATIENT
Start: 2021-07-07 | End: 2021-09-22

## 2021-07-14 NOTE — PROGRESS NOTES
HPI:   Merlinda Kirsten is a 77year old female who presents for a Medicare Initial Preventative Physical Exam (Welcome to Medicare- < 12 months on Medicare).       Annual Physical due on 06/18/2022        Fall/Risk Assessment   She has been screened fo mellitus ([address]     ST elevation myocardial infarction (STEMI) ([address]     Severe bipolar I disorder, most recent episode depressed ([address]     Pure hypercholesterolemia     Generalized anxiety disorder     Ischemic cardiomyopathy     Acute on chronic HFrEF (he by In Vitro route 2 (two) times daily. ONETOUCH ULTRA In Vitro Strip, TEST TWICE DAILY  Glucose Blood (CONTOUR NEXT TEST) In Vitro Strip, 1 each by Other route 2 (two) times daily.   Microlet Lancets Does not apply Misc, Check blood sugar twice daily  spir blurred vision or double vision  HEENT: denies nasal congestion, sinus pain or ST  LUNGS: denies shortness of breath with exertion  CARDIOVASCULAR: denies chest pain on exertion  GI: denies abdominal pain, denies heartburn  : denies dysuria, vaginal disc hear well and fear I will reply improperly: No   Family members and friends have told me they think I may have hearing loss:  No                Visual Acuity  Right Eye Visual Acuity: Corrected Right Eye Chart Acuity: 20/20   Left Eye Visual Acuity: Correct mcg/0.5 ml 04/04/2021   • Pneumococcal (Prevnar 13) 06/18/2021   • TDAP 07/02/2011        ASSESSMENT AND OTHER RELEVANT CHRONIC CONDITIONS:   Noble Toledo is a 77year old female who presents for a Medicare Assessment.      PLAN SUMMARY:   Diagnoses a physician but may not be covered, or covered at this frequency, by your insurer. Please check with your insurance carrier before scheduling to verify coverage.    PREVENTATIVE SERVICES FREQUENCY &  COVERAGE DETAILS LAST COMPLETION DATE   Diabetes Screenin normal risk;  Annually if at high risk -  No recommendations at this time    Chlamydia Annually if high risk -  No recommendations at this time   Screening Mammogram    Mammogram     Recommend annually for all female patients aged 36 and older    One baseli Value Date    BUN 22 (H) 06/01/2021       Drug Serum Conc Annually No results found for: DIGOXIN, DIG, VALP

## 2021-07-14 NOTE — PATIENT INSTRUCTIONS
Emilia Cordoba's SCREENING SCHEDULE   Tests on this list are recommended by your physician but may not be covered, or covered at this frequency, by your insurer. Please check with your insurance carrier before scheduling to verify coverage.    Matias Tony visit.      Annie Wallace Never done   Pap and Pelvic    Pap   Covered every 2 years for women at normal risk;  Annually if at high risk -  No recommendations at this time    Chlamydia Annually if high risk -  No recommendations at this time   Screening Mammogr Component Value Date    CREATSERUM 0.97 06/01/2021         BUN Annually Lab Results   Component Value Date    BUN 22 (H) 06/01/2021       Drug Serum Conc Annually No results found for: DIGOXIN, DIG, VALP              Recommended Websites for Advanced Dir

## 2021-07-15 RX ORDER — INSULIN DEGLUDEC INJECTION 100 U/ML
10 INJECTION, SOLUTION SUBCUTANEOUS DAILY
Qty: 9 ML | Refills: 0 | Status: SHIPPED | OUTPATIENT
Start: 2021-07-15 | End: 2021-07-29

## 2021-07-15 NOTE — TELEPHONE ENCOUNTER
BG readings reviewed and readings are at goal.       Lets continue with current medication regimen at this time.       Please clarify that below medications/doses are what patient is taking    [place] 10 units once daily     Glipizide 5mg with breakfast and
Received letter with attached BG readings. Per letter, patient is using Glipizide 1.5 - 2 tabs daily. She also indicates she needs a refill on glipizide ( refilled 07/07/21 for 90 days) and [place]. Pended 3 month supply. Placed letter on APRN desk.
right flank pain since last night

## 2021-07-26 ENCOUNTER — TELEPHONE (OUTPATIENT)
Dept: CARDIOLOGY | Age: 66
End: 2021-07-26

## 2021-07-27 ENCOUNTER — LAB ENCOUNTER (OUTPATIENT)
Dept: LAB | Age: 66
End: 2021-07-27
Attending: FAMILY MEDICINE
Payer: MEDICARE

## 2021-07-27 DIAGNOSIS — R79.89 ELEVATED LIVER FUNCTION TESTS: ICD-10-CM

## 2021-07-27 LAB
ALBUMIN SERPL-MCNC: 3.5 G/DL (ref 3.4–5)
ALP LIVER SERPL-CCNC: 109 U/L
ALT SERPL-CCNC: 48 U/L
AST SERPL-CCNC: 32 U/L (ref 15–37)
BILIRUB DIRECT SERPL-MCNC: 0.3 MG/DL (ref 0–0.2)
BILIRUB SERPL-MCNC: 1.2 MG/DL (ref 0.1–2)
M PROTEIN MFR SERPL ELPH: 6.7 G/DL (ref 6.4–8.2)

## 2021-07-27 PROCEDURE — 80076 HEPATIC FUNCTION PANEL: CPT

## 2021-07-27 PROCEDURE — 36415 COLL VENOUS BLD VENIPUNCTURE: CPT

## 2021-07-29 ENCOUNTER — OFFICE VISIT (OUTPATIENT)
Dept: ENDOCRINOLOGY CLINIC | Facility: CLINIC | Age: 66
End: 2021-07-29
Payer: MEDICARE

## 2021-07-29 VITALS
BODY MASS INDEX: 24.15 KG/M2 | DIASTOLIC BLOOD PRESSURE: 71 MMHG | HEART RATE: 84 BPM | WEIGHT: 123 LBS | HEIGHT: 60 IN | SYSTOLIC BLOOD PRESSURE: 110 MMHG

## 2021-07-29 DIAGNOSIS — Z79.4 TYPE 2 DIABETES MELLITUS WITH HYPERGLYCEMIA, WITH LONG-TERM CURRENT USE OF INSULIN (HCC): Primary | ICD-10-CM

## 2021-07-29 DIAGNOSIS — E11.65 TYPE 2 DIABETES MELLITUS WITH HYPERGLYCEMIA, WITH LONG-TERM CURRENT USE OF INSULIN (HCC): Primary | ICD-10-CM

## 2021-07-29 LAB
CARTRIDGE LOT#: ABNORMAL NUMERIC
GLUCOSE BLOOD: 147
HEMOGLOBIN A1C: 8 % (ref 4.3–5.6)
TEST STRIP LOT #: NORMAL NUMERIC

## 2021-07-29 PROCEDURE — 36416 COLLJ CAPILLARY BLOOD SPEC: CPT | Performed by: NURSE PRACTITIONER

## 2021-07-29 PROCEDURE — 83036 HEMOGLOBIN GLYCOSYLATED A1C: CPT | Performed by: NURSE PRACTITIONER

## 2021-07-29 PROCEDURE — 99213 OFFICE O/P EST LOW 20 MIN: CPT | Performed by: NURSE PRACTITIONER

## 2021-07-29 PROCEDURE — 82947 ASSAY GLUCOSE BLOOD QUANT: CPT | Performed by: NURSE PRACTITIONER

## 2021-07-29 RX ORDER — INSULIN DEGLUDEC INJECTION 100 U/ML
12 INJECTION, SOLUTION SUBCUTANEOUS DAILY
Qty: 11 ML | Refills: 0 | Status: SHIPPED | OUTPATIENT
Start: 2021-07-29 | End: 2021-11-04

## 2021-07-29 RX ORDER — PEN NEEDLE, DIABETIC 32GX 5/32"
1 NEEDLE, DISPOSABLE MISCELLANEOUS DAILY
Qty: 100 EACH | Refills: 1 | Status: SHIPPED | OUTPATIENT
Start: 2021-07-29 | End: 2021-11-04

## 2021-07-29 RX ORDER — BLOOD SUGAR DIAGNOSTIC
STRIP MISCELLANEOUS
Qty: 270 EACH | Refills: 1 | Status: SHIPPED | OUTPATIENT
Start: 2021-07-29 | End: 2021-11-04

## 2021-07-29 NOTE — PROGRESS NOTES
Name: Wilson Day  Date: 7/29/2021    CHIEF COMPLAINT   No chief complaint on file. HISTORY OF PRESENT ILLNESS   Wilson Day is a 77year old female who presents for follow up on diabetes management.  Patient is accompanied by her  yes-last exam was on  6/2021-- eye clinic in [place]   History of Neuropathy: no   History of Nephropathy: no     ASSOCIATED COMPLICATIONS:   HTN: no   Hyperlipidemia: yes- on statin   Coronary Artery Disease:  Yes -STEMI  Cerebrovascular Disease: no     D insulin use, Disp: 100 each, Rfl: 1  •  Glucose Blood (ONETOUCH ULTRA) In Vitro Strip, Use to check blood sugars twice daily.  DX: E11.65 w/ insulin use, Disp: 200 each, Rfl: 1  •  Lancets (ONETOUCH DELICA PLUS [id number]) Does not apply Misc, 1 strip by In file    Tobacco Use      Smoking status: Former Smoker      Smokeless tobacco: Never Used      Tobacco comment: 2 years    Vaping Use      Vaping Use: Never used    Substance and Sexual Activity      Alcohol use: No      Drug use: No    Other Topics      C pathogenesis of diabetes, clinical significance of A1c, and common complications such as: microvascular, macrovascular and diabetic ketoacidosis. Patient verbalizes understanding of the importance of glycemic control and the goals of therapy.    Per  persistently >200. The risks and benefits of my recommendations were discussed with the patient today. Questions were also answered to the best of my knowledge.  Patient verbalizes understanding of these issues and agrees to the plan.    7/29/2021  Timoteo

## 2021-07-29 NOTE — PATIENT INSTRUCTIONS
A1C: 8.0% today -->improved from 8.4% on 3/25/2021  Blood glucose: 147 in clinic today    Medications:   -Increase Tresiba 10 --> 12 units once daily   -continue with glipizide 5mg with breakfast          5mg with dinner     Blood sugar testing:  Test your

## 2021-08-09 ENCOUNTER — APPOINTMENT (OUTPATIENT)
Dept: CARDIOLOGY | Age: 66
End: 2021-08-09

## 2021-09-22 RX ORDER — GLIPIZIDE 5 MG/1
TABLET ORAL
Qty: 180 TABLET | Refills: 0 | Status: SHIPPED | OUTPATIENT
Start: 2021-09-22 | End: 2021-11-04

## 2021-09-29 RX ORDER — LANCETS 33 GAUGE
EACH MISCELLANEOUS
Qty: 200 EACH | Refills: 1 | Status: SHIPPED | OUTPATIENT
Start: 2021-09-29 | End: 2021-11-04

## 2021-10-18 ENCOUNTER — TELEPHONE (OUTPATIENT)
Dept: ENDOCRINOLOGY CLINIC | Facility: CLINIC | Age: 66
End: 2021-10-18

## 2021-10-18 NOTE — TELEPHONE ENCOUNTER
BG readings reviewed and they look stable. Please call and inform patient to continue with current medication regimen and we will plan to review her readings again at time of visit. Thank you!

## 2021-10-18 NOTE — TELEPHONE ENCOUNTER
Patient dropped off BG readings for the last 64 days. Patient has upcoming appointment on 11/04/21 with our office. Placed on providers desk for review.

## 2021-11-04 ENCOUNTER — OFFICE VISIT (OUTPATIENT)
Dept: ENDOCRINOLOGY CLINIC | Facility: CLINIC | Age: 66
End: 2021-11-04
Payer: MEDICARE

## 2021-11-04 VITALS
SYSTOLIC BLOOD PRESSURE: 115 MMHG | HEIGHT: 60 IN | HEART RATE: 72 BPM | BODY MASS INDEX: 25.32 KG/M2 | WEIGHT: 129 LBS | DIASTOLIC BLOOD PRESSURE: 74 MMHG

## 2021-11-04 DIAGNOSIS — Z79.4 TYPE 2 DIABETES MELLITUS WITHOUT COMPLICATION, WITH LONG-TERM CURRENT USE OF INSULIN (HCC): Primary | ICD-10-CM

## 2021-11-04 DIAGNOSIS — E11.9 TYPE 2 DIABETES MELLITUS WITHOUT COMPLICATION, WITH LONG-TERM CURRENT USE OF INSULIN (HCC): Primary | ICD-10-CM

## 2021-11-04 PROCEDURE — 36416 COLLJ CAPILLARY BLOOD SPEC: CPT | Performed by: NURSE PRACTITIONER

## 2021-11-04 PROCEDURE — 83036 HEMOGLOBIN GLYCOSYLATED A1C: CPT | Performed by: NURSE PRACTITIONER

## 2021-11-04 PROCEDURE — 82947 ASSAY GLUCOSE BLOOD QUANT: CPT | Performed by: NURSE PRACTITIONER

## 2021-11-04 PROCEDURE — 99213 OFFICE O/P EST LOW 20 MIN: CPT | Performed by: NURSE PRACTITIONER

## 2021-11-04 RX ORDER — PEN NEEDLE, DIABETIC 32GX 5/32"
1 NEEDLE, DISPOSABLE MISCELLANEOUS DAILY
Qty: 100 EACH | Refills: 1 | Status: SHIPPED | OUTPATIENT
Start: 2021-11-04

## 2021-11-04 RX ORDER — BLOOD SUGAR DIAGNOSTIC
STRIP MISCELLANEOUS
Qty: 100 EACH | Refills: 1 | Status: SHIPPED | OUTPATIENT
Start: 2021-11-04

## 2021-11-04 RX ORDER — GLIPIZIDE 5 MG/1
5 TABLET ORAL
Qty: 180 TABLET | Refills: 1 | Status: SHIPPED | OUTPATIENT
Start: 2021-11-04

## 2021-11-04 RX ORDER — LANCETS 33 GAUGE
1 EACH MISCELLANEOUS 2 TIMES DAILY
Qty: 100 EACH | Refills: 1 | Status: SHIPPED | OUTPATIENT
Start: 2021-11-04

## 2021-11-04 RX ORDER — INSULIN DEGLUDEC INJECTION 100 U/ML
12 INJECTION, SOLUTION SUBCUTANEOUS DAILY
Qty: 11 ML | Refills: 1 | Status: SHIPPED | OUTPATIENT
Start: 2021-11-04

## 2021-11-04 NOTE — PATIENT INSTRUCTIONS
A1C: 7.7% today -->decreased from 8.0% on 7/29/2021  Blood glucose: 124 in clinic today    Medications:   -continue with Neyda Chatom 12 units once daily   -continue with glipizide 5mg with breakfast          5mg with dinner     -increase walking as much as abl

## 2021-11-04 NOTE — PROGRESS NOTES
Name: Cleo March  Date: 11/4/2021    CHIEF COMPLAINT   Patient presents with:  Diabetes: Pt is here for a follow up. HISTORY OF PRESENT ILLNESS   Cleo March is a 77year old female who presents for follow up on diabetes management.  Kevon Lomax Neuropathy: no   History of Nephropathy: no     ASSOCIATED COMPLICATIONS:   HTN: no   Hyperlipidemia: yes- on statin   Coronary Artery Disease:  Yes -STEMI  Cerebrovascular Disease: no     DIETARY COMPLIANCE:  Fair- eats 3 meals per day; no changes in diet insulin use, Disp: 100 each, Rfl: 1  •  latanoprost 0.005 % Ophthalmic Solution, Place 1 drop into both eyes nightly., Disp: , Rfl:   •  Losartan Potassium 25 MG Oral Tab, Take 25 mg by mouth daily. , Disp: , Rfl:   •  ONETOUCH ULTRA In Vitro Strip, TEST TW No    PAST MEDICAL HISTORY:   Past Medical History:   Diagnosis Date   • Anxiety state    • Bipolar affective ([facility]    • Breast cyst 1977   • Cataract 2012   • Congestive heart disease ([facility]    • COPD (chronic obstructive [facility] disease) [facility]    • Cup older adults (ages 72 and above) who have multiple coexisting chronic illnesses (3 or more; chronic illness may include: arthritis, cancer, congestive heart failure, depression, emphysema, falls, hypertension, incontinence, stage 3 or worse chronic kidney persistently >200. The risks and benefits of my recommendations were discussed with the patient today. Questions were also answered to the best of my knowledge. Patient verbalizes understanding of these issues and agrees to the plan.     11/4/2021  Timoteo

## 2022-01-28 ENCOUNTER — TELEPHONE (OUTPATIENT)
Dept: ENDOCRINOLOGY CLINIC | Facility: CLINIC | Age: 67
End: 2022-01-28

## 2022-03-31 ENCOUNTER — TELEPHONE (OUTPATIENT)
Dept: ENDOCRINOLOGY CLINIC | Facility: CLINIC | Age: 67
End: 2022-03-31

## 2022-03-31 NOTE — TELEPHONE ENCOUNTER
Received a fax from [facility], Diabetic Detailed Written Order, was filled out and placed on providers desk for review and signature.

## 2022-05-03 ENCOUNTER — TELEPHONE (OUTPATIENT)
Dept: ENDOCRINOLOGY CLINIC | Facility: CLINIC | Age: 67
End: 2022-05-03

## 2022-05-03 NOTE — TELEPHONE ENCOUNTER
Received a Diabetic detailed written order from [place], placed order on providers desk for signature and review.

## 2022-05-12 ENCOUNTER — TELEPHONE (OUTPATIENT)
Dept: ENDOCRINOLOGY CLINIC | Facility: CLINIC | Age: 67
End: 2022-05-12

## 2022-05-12 NOTE — TELEPHONE ENCOUNTER
Patient has dropped off BG readings for me to review. Readings reviewed and the following was noted:     Variability with both fasting and before dinner readings    [id number]   Before dinner: [id number]    BG readings are currently stable. No changes needed at this time. Will review readings with patient at upcoming visit on  and review potential option of transitioning to [address] for more optimal glycemic control. Thank you!

## 2022-05-24 ENCOUNTER — OFFICE VISIT (OUTPATIENT)
Dept: FAMILY MEDICINE CLINIC | Facility: CLINIC | Age: 67
End: 2022-05-24
Payer: MEDICARE

## 2022-05-24 ENCOUNTER — TELEPHONE (OUTPATIENT)
Dept: ENDOCRINOLOGY CLINIC | Facility: CLINIC | Age: 67
End: 2022-05-24

## 2022-05-24 ENCOUNTER — OFFICE VISIT (OUTPATIENT)
Dept: ENDOCRINOLOGY CLINIC | Facility: CLINIC | Age: 67
End: 2022-05-24
Payer: MEDICARE

## 2022-05-24 VITALS
HEART RATE: 63 BPM | BODY MASS INDEX: 26.07 KG/M2 | HEIGHT: 60 IN | DIASTOLIC BLOOD PRESSURE: 61 MMHG | WEIGHT: 132.81 LBS | SYSTOLIC BLOOD PRESSURE: 97 MMHG

## 2022-05-24 VITALS
WEIGHT: 132.81 LBS | BODY MASS INDEX: 26 KG/M2 | HEART RATE: 72 BPM | SYSTOLIC BLOOD PRESSURE: 110 MMHG | DIASTOLIC BLOOD PRESSURE: 73 MMHG

## 2022-05-24 DIAGNOSIS — Z12.31 ENCOUNTER FOR SCREENING MAMMOGRAM FOR MALIGNANT NEOPLASM OF BREAST: Primary | ICD-10-CM

## 2022-05-24 DIAGNOSIS — E11.9 TYPE 2 DIABETES MELLITUS WITHOUT COMPLICATION, WITH LONG-TERM CURRENT USE OF INSULIN (HCC): Primary | ICD-10-CM

## 2022-05-24 DIAGNOSIS — N64.4 BREAST PAIN, RIGHT: ICD-10-CM

## 2022-05-24 DIAGNOSIS — Z79.4 TYPE 2 DIABETES MELLITUS WITHOUT COMPLICATION, WITH LONG-TERM CURRENT USE OF INSULIN (HCC): Primary | ICD-10-CM

## 2022-05-24 LAB
CARTRIDGE LOT#: ABNORMAL NUMERIC
GLUCOSE BLOOD: 312
HEMOGLOBIN A1C: 8.6 % (ref 4.3–5.6)
TEST STRIP LOT #: NORMAL NUMERIC

## 2022-05-24 PROCEDURE — 82947 ASSAY GLUCOSE BLOOD QUANT: CPT | Performed by: NURSE PRACTITIONER

## 2022-05-24 PROCEDURE — 99214 OFFICE O/P EST MOD 30 MIN: CPT | Performed by: FAMILY MEDICINE

## 2022-05-24 PROCEDURE — 83036 HEMOGLOBIN GLYCOSYLATED A1C: CPT | Performed by: NURSE PRACTITIONER

## 2022-05-24 PROCEDURE — 99214 OFFICE O/P EST MOD 30 MIN: CPT | Performed by: NURSE PRACTITIONER

## 2022-05-24 RX ORDER — BLOOD SUGAR DIAGNOSTIC
STRIP MISCELLANEOUS
Qty: 100 EACH | Refills: 1 | Status: SHIPPED | OUTPATIENT
Start: 2022-05-24

## 2022-05-24 RX ORDER — INSULIN GLARGINE AND LIXISENATIDE 100; 33 U/ML; UG/ML
15 INJECTION, SOLUTION SUBCUTANEOUS DAILY
Qty: 14 ML | Refills: 0 | Status: SHIPPED | OUTPATIENT
Start: 2022-05-24

## 2022-05-24 RX ORDER — PEN NEEDLE, DIABETIC 32GX 5/32"
1 NEEDLE, DISPOSABLE MISCELLANEOUS DAILY
Qty: 100 EACH | Refills: 1 | Status: SHIPPED | OUTPATIENT
Start: 2022-05-24

## 2022-05-24 RX ORDER — INSULIN GLARGINE AND LIXISENATIDE 100; 33 U/ML; UG/ML
12 INJECTION, SOLUTION SUBCUTANEOUS DAILY
Qty: 11 ML | Refills: 0 | Status: SHIPPED | OUTPATIENT
Start: 2022-05-24 | End: 2022-05-24

## 2022-05-24 NOTE — PATIENT INSTRUCTIONS
A1C: 8.6% today -->increased from 7.7% on 11/4/2021  Blood glucose: 312 in clinic today  [facility] Endocrinology clinic fax: [phone]    Medications:   - stop Jacqueline Simón   -stop glipizide  -start Soliqua 15 units once daily in morning before first meal     -lets get back on track with low carb diet-- avoid cupcakes   -increase walking to 4x weekly for at least 30-45mins       Please call to give me an update on your blood glucose readings in 1 week after starting [address]. A1C goal:   <8.0%    Blood sugar testing:  Test your blood sugar 1 time daily   Recommended times to test: alternate with either bfore breakfast (fasting) or before dinner     Blood sugar targets:  Before breakfast:   (preferably < 110)  Before meals OR 2 hours after meals: <180 (preferably <150)     Call for persistent blood sugars < 75 or > 200.

## 2022-05-25 NOTE — TELEPHONE ENCOUNTER
Pharmacy states they received the signed form but it had a [place] address on there for the pt and now medicare has denied it,, it needs to be re done, signed and sent back with the pt's correct address in [facility].

## 2022-05-25 NOTE — TELEPHONE ENCOUNTER
rn printed cmn scanned 5/11/22 and corrected address and updated aic and faxed to [facility] in [address] [facility]

## 2022-05-26 ENCOUNTER — LAB ENCOUNTER (OUTPATIENT)
Dept: LAB | Age: 67
End: 2022-05-26
Attending: NURSE PRACTITIONER
Payer: MEDICARE

## 2022-05-26 DIAGNOSIS — E11.9 TYPE 2 DIABETES MELLITUS WITHOUT COMPLICATION, WITH LONG-TERM CURRENT USE OF INSULIN (HCC): ICD-10-CM

## 2022-05-26 DIAGNOSIS — Z79.4 TYPE 2 DIABETES MELLITUS WITHOUT COMPLICATION, WITH LONG-TERM CURRENT USE OF INSULIN (HCC): ICD-10-CM

## 2022-05-26 LAB
ALBUMIN SERPL-MCNC: 3.4 G/DL (ref 3.4–5)
ALBUMIN/GLOB SERPL: 1.1 {RATIO} (ref 1–2)
ALP LIVER SERPL-CCNC: 85 U/L
ALT SERPL-CCNC: 60 U/L
ANION GAP SERPL CALC-SCNC: 5 MMOL/L (ref 0–18)
AST SERPL-CCNC: 37 U/L (ref 15–37)
BILIRUB SERPL-MCNC: 0.8 MG/DL (ref 0.1–2)
BUN BLD-MCNC: 29 MG/DL (ref 7–18)
BUN/CREAT SERPL: 27.4 (ref 10–20)
CALCIUM BLD-MCNC: 9.3 MG/DL (ref 8.5–10.1)
CHLORIDE SERPL-SCNC: 107 MMOL/L (ref 98–112)
CHOLEST SERPL-MCNC: 112 MG/DL (ref ?–200)
CO2 SERPL-SCNC: 29 MMOL/L (ref 21–32)
CREAT BLD-MCNC: 1.06 MG/DL
CREAT UR-SCNC: 265 MG/DL
FASTING PATIENT LIPID ANSWER: YES
FASTING STATUS PATIENT QL REPORTED: YES
GLOBULIN PLAS-MCNC: 3.2 G/DL (ref 2.8–4.4)
GLUCOSE BLD-MCNC: 111 MG/DL (ref 70–99)
HDLC SERPL-MCNC: 40 MG/DL (ref 40–59)
LDLC SERPL CALC-MCNC: 53 MG/DL (ref ?–100)
MICROALBUMIN UR-MCNC: 1.15 MG/DL
MICROALBUMIN/CREAT 24H UR-RTO: 4.3 UG/MG (ref ?–30)
NONHDLC SERPL-MCNC: 72 MG/DL (ref ?–130)
OSMOLALITY SERPL CALC.SUM OF ELEC: 299 MOSM/KG (ref 275–295)
POTASSIUM SERPL-SCNC: 4.1 MMOL/L (ref 3.5–5.1)
PROT SERPL-MCNC: 6.6 G/DL (ref 6.4–8.2)
SODIUM SERPL-SCNC: 141 MMOL/L (ref 136–145)
TRIGL SERPL-MCNC: 102 MG/DL (ref 30–149)
VLDLC SERPL CALC-MCNC: 15 MG/DL (ref 0–30)

## 2022-05-26 PROCEDURE — 36415 COLL VENOUS BLD VENIPUNCTURE: CPT

## 2022-05-26 PROCEDURE — 82043 UR ALBUMIN QUANTITATIVE: CPT

## 2022-05-26 PROCEDURE — 80053 COMPREHEN METABOLIC PANEL: CPT

## 2022-05-26 PROCEDURE — 82570 ASSAY OF URINE CREATININE: CPT

## 2022-05-26 PROCEDURE — 80061 LIPID PANEL: CPT

## 2022-05-31 ENCOUNTER — TELEPHONE (OUTPATIENT)
Dept: ENDOCRINOLOGY CLINIC | Facility: CLINIC | Age: 67
End: 2022-05-31

## 2022-05-31 NOTE — TELEPHONE ENCOUNTER
----- Message from LACEY Gagnon sent at 5/31/2022 11:15 AM CDT -----  Please call patient to notify her that her lab results are back and demonstrate the following:    -normal lipid panel (cholesterol)  -normal electrolytes    -slight decrease in kidney function. This may be due to higher BG readings lately and potentially not drinking enough water. During her LOV 5/24 I transitioned patient to [address]. Please get an update on her BG readings after this change. Also Please instruct patient to increase PO water intake to 60 oz per day. We will plan to repeat kidney lab again in 6 weeks. BMP entered for patient to repeat either on 7/12 or any day after. Thank you!

## 2022-06-01 NOTE — TELEPHONE ENCOUNTER
n called patient and spoke with [facility]    Carmen Drake him message by Zoila Wilkes NP   And verbalized understanding of instructions states he   Will take he to do labs 7/14

## 2022-06-01 NOTE — TELEPHONE ENCOUNTER
Please try calling patient again later today. BG readings reviewed. Please instruct patient to increase soliqua 15-->17 units once daily in AM    Thank you!

## 2022-06-01 NOTE — TELEPHONE ENCOUNTER
in has questions about the recent test results and is calling in to to give results of blood sugar levels for pt   5/25 117 pm 5/26 115 am 5/27 141 pm 5/28 147 am 5/29 161 pm 5/31 200 pm 6/1 183 am. Please follow up

## 2022-06-06 ENCOUNTER — TELEPHONE (OUTPATIENT)
Dept: ENDOCRINOLOGY CLINIC | Facility: CLINIC | Age: 67
End: 2022-06-06

## 2022-06-06 RX ORDER — BLOOD SUGAR DIAGNOSTIC
STRIP MISCELLANEOUS
Qty: 200 EACH | Refills: 1 | Status: SHIPPED | OUTPATIENT
Start: 2022-06-06

## 2022-06-06 NOTE — TELEPHONE ENCOUNTER
LOV: 5/24/22  Refilled per protocol. There is already a CMN sent over on 5/256/22 (please see media tab). Spoke with Newton who states script is still getting rejected. RN informed him there is already a CMN form sent. He states there's nothing he can do since it's Medicare's process. RN informed him that other [facility] are able to call Medicare to see if they can process/update the patient's file. He was made aware that patient is running out and asked him to at least try to call their corporate. RN faxed CMN to their store and to [email or web address] again.

## 2022-06-08 NOTE — TELEPHONE ENCOUNTER
Received a Diabetic detailed written order from [facility] requesting for providers signature. Place form on providers desk for review and signature. Cheek-To-Nose Interpolation Flap Text: A decision was made to reconstruct the defect utilizing an interpolation axial flap and a staged reconstruction.  A telfa template was made of the defect.  This telfa template was then used to outline the Cheek-To-Nose Interpolation flap.  The donor area for the pedicle flap was then injected with anesthesia.  The flap was excised through the skin and subcutaneous tissue down to the layer of the underlying musculature.  The interpolation flap was carefully excised within this deep plane to maintain its blood supply.  The edges of the donor site were undermined.   The donor site was closed in a primary fashion.  The pedicle was then rotated into position and sutured.  Once the tube was sutured into place, adequate blood supply was confirmed with blanching and refill.  The pedicle was then wrapped with xeroform gauze and dressed appropriately with a telfa and gauze bandage to ensure continued blood supply and protect the attached pedicle.

## 2022-06-09 RX ORDER — LANCETS 33 GAUGE
EACH MISCELLANEOUS
Qty: 100 EACH | Refills: 1 | Status: SHIPPED | OUTPATIENT
Start: 2022-06-09

## 2022-06-15 ENCOUNTER — TELEPHONE (OUTPATIENT)
Dept: ENDOCRINOLOGY CLINIC | Facility: CLINIC | Age: 67
End: 2022-06-15

## 2022-06-21 ENCOUNTER — OFFICE VISIT (OUTPATIENT)
Dept: FAMILY MEDICINE CLINIC | Facility: CLINIC | Age: 67
End: 2022-06-21
Payer: MEDICARE

## 2022-06-21 ENCOUNTER — TELEPHONE (OUTPATIENT)
Dept: ENDOCRINOLOGY CLINIC | Facility: CLINIC | Age: 67
End: 2022-06-21

## 2022-06-21 VITALS
WEIGHT: 131 LBS | HEART RATE: 65 BPM | DIASTOLIC BLOOD PRESSURE: 66 MMHG | BODY MASS INDEX: 25.72 KG/M2 | HEIGHT: 60 IN | SYSTOLIC BLOOD PRESSURE: 99 MMHG

## 2022-06-21 DIAGNOSIS — I25.10 CORONARY ARTERY DISEASE INVOLVING NATIVE CORONARY ARTERY OF NATIVE HEART WITHOUT ANGINA PECTORIS: ICD-10-CM

## 2022-06-21 DIAGNOSIS — I25.5 ISCHEMIC CARDIOMYOPATHY: ICD-10-CM

## 2022-06-21 DIAGNOSIS — F31.4 SEVERE BIPOLAR I DISORDER, MOST RECENT EPISODE DEPRESSED (HCC): ICD-10-CM

## 2022-06-21 DIAGNOSIS — Z12.11 COLON CANCER SCREENING: ICD-10-CM

## 2022-06-21 DIAGNOSIS — R79.89 ELEVATED LIVER FUNCTION TESTS: ICD-10-CM

## 2022-06-21 DIAGNOSIS — F41.1 GENERALIZED ANXIETY DISORDER: ICD-10-CM

## 2022-06-21 DIAGNOSIS — E11.9 TYPE 2 DIABETES MELLITUS WITHOUT COMPLICATION, WITH LONG-TERM CURRENT USE OF INSULIN (HCC): ICD-10-CM

## 2022-06-21 DIAGNOSIS — Z00.00 ENCOUNTER FOR ANNUAL HEALTH EXAMINATION: ICD-10-CM

## 2022-06-21 DIAGNOSIS — E78.00 PURE HYPERCHOLESTEROLEMIA: ICD-10-CM

## 2022-06-21 DIAGNOSIS — Z79.4 TYPE 2 DIABETES MELLITUS WITHOUT COMPLICATION, WITH LONG-TERM CURRENT USE OF INSULIN (HCC): ICD-10-CM

## 2022-06-21 DIAGNOSIS — I50.22 CHRONIC SYSTOLIC HEART FAILURE (HCC): Primary | ICD-10-CM

## 2022-06-21 DIAGNOSIS — Z95.810 PRESENCE OF CARDIAC DEFIBRILLATOR: ICD-10-CM

## 2022-06-21 PROBLEM — I50.23 ACUTE ON CHRONIC HFREF (HEART FAILURE WITH REDUCED EJECTION FRACTION) (HCC): Status: RESOLVED | Noted: 2020-03-03 | Resolved: 2022-06-21

## 2022-06-21 PROBLEM — I21.3 ST ELEVATION MYOCARDIAL INFARCTION (STEMI) (HCC): Status: RESOLVED | Noted: 2020-02-04 | Resolved: 2022-06-21

## 2022-06-21 PROCEDURE — G0439 PPPS, SUBSEQ VISIT: HCPCS | Performed by: FAMILY MEDICINE

## 2022-06-21 PROCEDURE — 1126F AMNT PAIN NOTED NONE PRSNT: CPT | Performed by: FAMILY MEDICINE

## 2022-06-21 NOTE — TELEPHONE ENCOUNTER
Pharm calling to f/up on fax sent yesterday for CMN for test strips. Pharm states that pt has been waiting for 1 week.

## 2022-06-21 NOTE — TELEPHONE ENCOUNTER
Form filled and faxed to [facility] and medicare    rn called [facility] to confirm received,   They sent to medicare

## 2022-06-28 ENCOUNTER — LAB ENCOUNTER (OUTPATIENT)
Dept: LAB | Age: 67
End: 2022-06-28
Attending: FAMILY MEDICINE
Payer: MEDICARE

## 2022-06-28 DIAGNOSIS — Z12.11 COLON CANCER SCREENING: ICD-10-CM

## 2022-06-28 LAB — HEMOCCULT STL QL: NEGATIVE

## 2022-06-28 PROCEDURE — 82274 ASSAY TEST FOR BLOOD FECAL: CPT

## 2022-07-08 ENCOUNTER — TELEPHONE (OUTPATIENT)
Dept: ENDOCRINOLOGY CLINIC | Facility: CLINIC | Age: 67
End: 2022-07-08

## 2022-07-08 NOTE — TELEPHONE ENCOUNTER
Received letter from pt and pt  that is dated June 18 2022. States that the  has been having some difficulty in refilling the test strips. On the handwritten letter, it states medicare sent the strips to the wrong address. Letter also states they had to buy strips due to the address situation. Attached is also Blood Glucose readings with another letter attached of how the medication changes have been helping. After some investigating, pt address in [facility] is the same as it is on the handwritten letter. See TE 6/21/2022. Strips were sent to [facility], to which Martha sent to Medicare. Placed both handwritten notes on providers desk for review.

## 2022-07-12 ENCOUNTER — HOSPITAL ENCOUNTER (OUTPATIENT)
Dept: MAMMOGRAPHY | Age: 67
Discharge: HOME | End: 2022-07-12
Attending: FAMILY MEDICINE
Payer: MEDICARE

## 2022-07-12 DIAGNOSIS — Z12.31 ENCOUNTER FOR SCREENING MAMMOGRAM FOR MALIGNANT NEOPLASM OF BREAST: ICD-10-CM

## 2022-07-12 PROCEDURE — 77063 BREAST TOMOSYNTHESIS BI: CPT | Performed by: FAMILY MEDICINE

## 2022-07-12 PROCEDURE — 77067 SCR MAMMO BI INCL CAD: CPT | Performed by: FAMILY MEDICINE

## 2022-07-14 ENCOUNTER — LAB ENCOUNTER (OUTPATIENT)
Dept: LAB | Age: 67
End: 2022-07-14
Attending: NURSE PRACTITIONER
Payer: MEDICARE

## 2022-07-14 DIAGNOSIS — Z79.4 TYPE 2 DIABETES MELLITUS WITH HYPERGLYCEMIA, WITH LONG-TERM CURRENT USE OF INSULIN (HCC): ICD-10-CM

## 2022-07-14 DIAGNOSIS — E11.65 TYPE 2 DIABETES MELLITUS WITH HYPERGLYCEMIA, WITH LONG-TERM CURRENT USE OF INSULIN (HCC): ICD-10-CM

## 2022-07-14 LAB
ANION GAP SERPL CALC-SCNC: 6 MMOL/L (ref 0–18)
BUN BLD-MCNC: 18 MG/DL (ref 7–18)
BUN/CREAT SERPL: 17 (ref 10–20)
CALCIUM BLD-MCNC: 8.9 MG/DL (ref 8.5–10.1)
CHLORIDE SERPL-SCNC: 108 MMOL/L (ref 98–112)
CO2 SERPL-SCNC: 28 MMOL/L (ref 21–32)
CREAT BLD-MCNC: 1.06 MG/DL
FASTING STATUS PATIENT QL REPORTED: YES
GLUCOSE BLD-MCNC: 147 MG/DL (ref 70–99)
OSMOLALITY SERPL CALC.SUM OF ELEC: 299 MOSM/KG (ref 275–295)
POTASSIUM SERPL-SCNC: 4.5 MMOL/L (ref 3.5–5.1)
SODIUM SERPL-SCNC: 142 MMOL/L (ref 136–145)

## 2022-07-14 PROCEDURE — 36415 COLL VENOUS BLD VENIPUNCTURE: CPT

## 2022-07-14 PROCEDURE — 80048 BASIC METABOLIC PNL TOTAL CA: CPT

## 2022-07-15 ENCOUNTER — TELEPHONE (OUTPATIENT)
Dept: ENDOCRINOLOGY CLINIC | Facility: CLINIC | Age: 67
End: 2022-07-15

## 2022-07-15 NOTE — TELEPHONE ENCOUNTER
Received fax from [facility]. Attached is pt most recent eye exam results, placed on provider desk for review.

## 2022-07-18 ENCOUNTER — MED REC SCAN ONLY (OUTPATIENT)
Dept: FAMILY MEDICINE CLINIC | Facility: CLINIC | Age: 67
End: 2022-07-18

## 2022-08-25 ENCOUNTER — OFFICE VISIT (OUTPATIENT)
Dept: ENDOCRINOLOGY CLINIC | Facility: CLINIC | Age: 67
End: 2022-08-25
Payer: MEDICARE

## 2022-08-25 VITALS
WEIGHT: 130.19 LBS | BODY MASS INDEX: 25 KG/M2 | DIASTOLIC BLOOD PRESSURE: 50 MMHG | SYSTOLIC BLOOD PRESSURE: 87 MMHG | HEART RATE: 63 BPM

## 2022-08-25 DIAGNOSIS — Z79.4 TYPE 2 DIABETES MELLITUS WITHOUT COMPLICATION, WITH LONG-TERM CURRENT USE OF INSULIN (HCC): Primary | ICD-10-CM

## 2022-08-25 DIAGNOSIS — E11.9 TYPE 2 DIABETES MELLITUS WITHOUT COMPLICATION, WITH LONG-TERM CURRENT USE OF INSULIN (HCC): Primary | ICD-10-CM

## 2022-08-25 LAB
CARTRIDGE LOT#: ABNORMAL NUMERIC
GLUCOSE BLOOD: 160
HEMOGLOBIN A1C: 7.9 % (ref 4.3–5.6)
TEST STRIP LOT #: NORMAL NUMERIC

## 2022-08-25 PROCEDURE — 99214 OFFICE O/P EST MOD 30 MIN: CPT | Performed by: NURSE PRACTITIONER

## 2022-08-25 PROCEDURE — 82947 ASSAY GLUCOSE BLOOD QUANT: CPT | Performed by: NURSE PRACTITIONER

## 2022-08-25 PROCEDURE — 83036 HEMOGLOBIN GLYCOSYLATED A1C: CPT | Performed by: NURSE PRACTITIONER

## 2022-08-25 RX ORDER — LANCETS 33 GAUGE
1 EACH MISCELLANEOUS 2 TIMES DAILY
Qty: 100 EACH | Refills: 1 | Status: SHIPPED | OUTPATIENT
Start: 2022-08-25

## 2022-08-25 NOTE — PATIENT INSTRUCTIONS
A1C: 7.9% today -->decreased from 8.6% on 5/24/2022  Blood glucose: 160 in clinic today    Medications:   -Increase Soliqua 20-->22 units once daily in AM (before first meal)     -start walking 4x per week for a minimum of 30 mins     -increase water intake to 4 bottles (12oz) per day     Weight:  Wt Readings from Last 6 Encounters:  08/25/22 : 130 lb 3.2 oz (59.1 kg)  06/21/22 : 131 lb (59.4 kg)  05/24/22 : 132 lb 12.8 oz (60.2 kg)  05/24/22 : 132 lb 12.8 oz (60.2 kg)  11/04/21 : 129 lb (58.5 kg)  07/29/21 : 123 lb (55.8 kg)    A1C goal:  <7.5%    Blood sugar testing:  Test your blood sugar 1 time daily   Recommended times to test: Before breakfast (fasting) or before dinner     Blood sugar targets:  Before breakfast:   (preferably < 110)  Before meals OR 2 hours after meals: <180 (preferably <150)     Call for persistent blood sugars < 75 or > 200

## 2022-09-12 RX ORDER — INSULIN GLARGINE AND LIXISENATIDE 100; 33 U/ML; UG/ML
INJECTION, SOLUTION SUBCUTANEOUS
Qty: 15 ML | Refills: 0 | Status: SHIPPED | OUTPATIENT
Start: 2022-09-12 | End: 2022-09-12

## 2022-09-12 RX ORDER — INSULIN GLARGINE AND LIXISENATIDE 100; 33 U/ML; UG/ML
22 INJECTION, SOLUTION SUBCUTANEOUS DAILY
Qty: 20 ML | Refills: 0 | Status: SHIPPED | OUTPATIENT
Start: 2022-09-12 | End: 2022-12-11

## 2022-11-08 RX ORDER — PEN NEEDLE, DIABETIC 32GX 5/32"
NEEDLE, DISPOSABLE MISCELLANEOUS
Qty: 100 EACH | Refills: 1 | Status: SHIPPED | OUTPATIENT
Start: 2022-11-08

## 2022-11-14 NOTE — PLAN OF CARE
Patient stated that he tested positive for Covid, yesterday.     Symptoms are as follows;     Cough:   Yes   Headache: yes   Weakness: yes     Patient was started on low dose lisinopril 2.5 mg today and noted to have low BP this afternoon 76/49 with no associated symptoms. Patient with no new current complaints. Per RN, patient with minimal po intake today, reports poor appetite.     Will

## 2022-12-01 RX ORDER — INSULIN GLARGINE AND LIXISENATIDE 100; 33 U/ML; UG/ML
22 INJECTION, SOLUTION SUBCUTANEOUS DAILY
Qty: 20 ML | Refills: 0 | Status: SHIPPED | OUTPATIENT
Start: 2022-12-01 | End: 2023-03-01

## 2022-12-05 ENCOUNTER — TELEPHONE (OUTPATIENT)
Dept: ENDOCRINOLOGY CLINIC | Facility: CLINIC | Age: 67
End: 2022-12-05

## 2022-12-05 NOTE — TELEPHONE ENCOUNTER
A letter with patient's BG log was received. BG readings per below:      Fasting  Before dinner   11/12 144    11/11   160   11/10 137   11/9   168  11/8 138  11/7   136  11/6 140  11/5   115  11/4 112  11/3   114  11/2 93    11/1   161  10/31 135  10/30   147  10/29 138  10/28   91  10/27 111    10/26   129  10/25 X    10/24   153      BG readings above are all stable. Please call and notify patient/ that fatemeh continues with the same medication regimen. No changes at this time. Thank you!

## 2022-12-06 NOTE — TELEPHONE ENCOUNTER
Medication regimen:  - Soliqua 22 units once daily in AM     Patient verbalized understanding and acknowledged. No further questions.

## 2022-12-29 ENCOUNTER — TELEPHONE (OUTPATIENT)
Dept: ENDOCRINOLOGY CLINIC | Facility: CLINIC | Age: 67
End: 2022-12-29

## 2022-12-29 NOTE — TELEPHONE ENCOUNTER
Pt mailed in a copy of her sugar readings. Dates: 11/15-12/5    Placed in folder for review.     Next OV:01/26/2023

## 2022-12-29 NOTE — TELEPHONE ENCOUNTER
BG readings reviewed and are stable. Please notify patient to continue with current medication regimen. Thank you!

## 2023-01-26 ENCOUNTER — OFFICE VISIT (OUTPATIENT)
Dept: ENDOCRINOLOGY CLINIC | Facility: CLINIC | Age: 68
End: 2023-01-26

## 2023-01-26 VITALS
HEART RATE: 77 BPM | WEIGHT: 125.38 LBS | DIASTOLIC BLOOD PRESSURE: 74 MMHG | SYSTOLIC BLOOD PRESSURE: 111 MMHG | BODY MASS INDEX: 24 KG/M2

## 2023-01-26 DIAGNOSIS — Z79.4 TYPE 2 DIABETES MELLITUS WITHOUT COMPLICATION, WITH LONG-TERM CURRENT USE OF INSULIN (HCC): Primary | ICD-10-CM

## 2023-01-26 DIAGNOSIS — E11.9 TYPE 2 DIABETES MELLITUS WITHOUT COMPLICATION, WITH LONG-TERM CURRENT USE OF INSULIN (HCC): Primary | ICD-10-CM

## 2023-01-26 LAB
CARTRIDGE LOT#: ABNORMAL NUMERIC
GLUCOSE BLOOD: 128
HEMOGLOBIN A1C: 7.7 % (ref 4.3–5.6)
TEST STRIP LOT #: NORMAL NUMERIC

## 2023-01-26 PROCEDURE — 99213 OFFICE O/P EST LOW 20 MIN: CPT | Performed by: NURSE PRACTITIONER

## 2023-01-26 PROCEDURE — 83036 HEMOGLOBIN GLYCOSYLATED A1C: CPT | Performed by: NURSE PRACTITIONER

## 2023-01-26 PROCEDURE — 82947 ASSAY GLUCOSE BLOOD QUANT: CPT | Performed by: NURSE PRACTITIONER

## 2023-01-26 RX ORDER — LANCETS 33 GAUGE
1 EACH MISCELLANEOUS DAILY
Qty: 100 EACH | Refills: 1 | Status: SHIPPED | OUTPATIENT
Start: 2023-01-26

## 2023-01-26 NOTE — PATIENT INSTRUCTIONS
A1C: 7.7% today --> slight decrease from 7.9% on 8/25/2022  Blood glucose: 128 in clinic today    Medications:   - continue with Soliqua 22 units once daily in AM (before first meal)     -have labs draw in May 2023  -continue with low carb diet   -continue with current exercise regimen     Weight:  Wt Readings from Last 6 Encounters:  01/26/23 : 125 lb 6.4 oz (56.9 kg)  08/25/22 : 130 lb 3.2 oz (59.1 kg)  06/21/22 : 131 lb (59.4 kg)  05/24/22 : 132 lb 12.8 oz (60.2 kg)  05/24/22 : 132 lb 12.8 oz (60.2 kg)  11/04/21 : 129 lb (58.5 kg)    A1C goal:  <7.5%    Blood sugar testing:  Test your blood sugar 1 time daily   Recommended times to test: alternate with before breakfast (fasting) or before dinner     Blood sugar targets:  Before breakfast:  (preferably < 110)  Before meals OR 2 hours after meals: <180 (preferably <150)     Call for persistent blood sugars < 75 or > 200.

## 2023-03-01 RX ORDER — INSULIN GLARGINE AND LIXISENATIDE 100; 33 U/ML; UG/ML
INJECTION, SOLUTION SUBCUTANEOUS
Qty: 21 ML | Refills: 0 | Status: SHIPPED | OUTPATIENT
Start: 2023-03-01

## 2023-03-17 ENCOUNTER — TELEPHONE (OUTPATIENT)
Dept: FAMILY MEDICINE CLINIC | Facility: CLINIC | Age: 68
End: 2023-03-17

## 2023-03-17 DIAGNOSIS — Z12.11 SCREENING FOR COLON CANCER: Primary | ICD-10-CM

## 2023-03-21 ENCOUNTER — TELEPHONE (OUTPATIENT)
Dept: ENDOCRINOLOGY CLINIC | Facility: CLINIC | Age: 68
End: 2023-03-21

## 2023-03-21 NOTE — TELEPHONE ENCOUNTER
Letter received from patient with an update on her BG readings:     Fastin- 120s/130s; on rare occasion 146 or 162  Before dinner: 100-130s; on rare occasion 141      BG readings we well controlled and at target. Please call /patient and notify them that they should CPM.     Thank you!

## 2023-04-13 RX ORDER — PEN NEEDLE, DIABETIC 32GX 5/32"
NEEDLE, DISPOSABLE MISCELLANEOUS
Qty: 100 EACH | Refills: 1 | Status: SHIPPED | OUTPATIENT
Start: 2023-04-13

## 2023-04-21 DIAGNOSIS — Z79.4 TYPE 2 DIABETES MELLITUS WITHOUT COMPLICATION, WITH LONG-TERM CURRENT USE OF INSULIN (HCC): Primary | ICD-10-CM

## 2023-04-21 DIAGNOSIS — E11.9 TYPE 2 DIABETES MELLITUS WITHOUT COMPLICATION, WITH LONG-TERM CURRENT USE OF INSULIN (HCC): Primary | ICD-10-CM

## 2023-04-23 RX ORDER — BLOOD SUGAR DIAGNOSTIC
STRIP MISCELLANEOUS
Qty: 200 STRIP | Refills: 1 | Status: SHIPPED | OUTPATIENT
Start: 2023-04-23

## 2023-05-02 ENCOUNTER — LAB ENCOUNTER (OUTPATIENT)
Dept: LAB | Age: 68
End: 2023-05-02
Attending: NURSE PRACTITIONER
Payer: MEDICARE

## 2023-05-02 DIAGNOSIS — E11.9 TYPE 2 DIABETES MELLITUS WITHOUT COMPLICATION, WITH LONG-TERM CURRENT USE OF INSULIN (HCC): ICD-10-CM

## 2023-05-02 DIAGNOSIS — Z79.4 TYPE 2 DIABETES MELLITUS WITHOUT COMPLICATION, WITH LONG-TERM CURRENT USE OF INSULIN (HCC): ICD-10-CM

## 2023-05-02 LAB
ALBUMIN SERPL-MCNC: 3.4 G/DL (ref 3.4–5)
ALBUMIN/GLOB SERPL: 1.1 {RATIO} (ref 1–2)
ALP LIVER SERPL-CCNC: 88 U/L
ALT SERPL-CCNC: 65 U/L
ANION GAP SERPL CALC-SCNC: 6 MMOL/L (ref 0–18)
AST SERPL-CCNC: 38 U/L (ref 15–37)
BILIRUB SERPL-MCNC: 0.6 MG/DL (ref 0.1–2)
BUN BLD-MCNC: 25 MG/DL (ref 7–18)
BUN/CREAT SERPL: 24.3 (ref 10–20)
CALCIUM BLD-MCNC: 9.2 MG/DL (ref 8.5–10.1)
CHLORIDE SERPL-SCNC: 111 MMOL/L (ref 98–112)
CHOLEST SERPL-MCNC: 102 MG/DL (ref ?–200)
CO2 SERPL-SCNC: 27 MMOL/L (ref 21–32)
CREAT BLD-MCNC: 1.03 MG/DL
CREAT UR-SCNC: 325 MG/DL
FASTING PATIENT LIPID ANSWER: YES
FASTING STATUS PATIENT QL REPORTED: YES
GFR SERPLBLD BASED ON 1.73 SQ M-ARVRAT: 59 ML/MIN/1.73M2 (ref 60–?)
GLOBULIN PLAS-MCNC: 3.1 G/DL (ref 2.8–4.4)
GLUCOSE BLD-MCNC: 123 MG/DL (ref 70–99)
HDLC SERPL-MCNC: 48 MG/DL (ref 40–59)
LDLC SERPL CALC-MCNC: 40 MG/DL (ref ?–100)
MICROALBUMIN UR-MCNC: 3.84 MG/DL
MICROALBUMIN/CREAT 24H UR-RTO: 11.8 UG/MG (ref ?–30)
NONHDLC SERPL-MCNC: 54 MG/DL (ref ?–130)
OSMOLALITY SERPL CALC.SUM OF ELEC: 304 MOSM/KG (ref 275–295)
POTASSIUM SERPL-SCNC: 4.3 MMOL/L (ref 3.5–5.1)
PROT SERPL-MCNC: 6.5 G/DL (ref 6.4–8.2)
SODIUM SERPL-SCNC: 144 MMOL/L (ref 136–145)
TRIGL SERPL-MCNC: 61 MG/DL (ref 30–149)
VLDLC SERPL CALC-MCNC: 8 MG/DL (ref 0–30)

## 2023-05-02 PROCEDURE — 80053 COMPREHEN METABOLIC PANEL: CPT

## 2023-05-02 PROCEDURE — 82043 UR ALBUMIN QUANTITATIVE: CPT

## 2023-05-02 PROCEDURE — 82570 ASSAY OF URINE CREATININE: CPT

## 2023-05-02 PROCEDURE — 36415 COLL VENOUS BLD VENIPUNCTURE: CPT

## 2023-05-02 PROCEDURE — 80061 LIPID PANEL: CPT

## 2023-05-08 ENCOUNTER — TELEPHONE (OUTPATIENT)
Dept: ENDOCRINOLOGY CLINIC | Facility: CLINIC | Age: 68
End: 2023-05-08

## 2023-05-08 NOTE — TELEPHONE ENCOUNTER
----- Message from LACEY Cm sent at 5/7/2023 11:19 PM CDT -----  Endo staff:    Please call patient to inform her of her lab results. - normal electrolytes     - stable kidney function, however does appear a bit dehydrated-- please ask patient to increase her water intake to 6-8 glasses of water per day. - normal protein in urine    - normal lipid/cholesterol panel     - mildly elevated liver enzymes. Looks like she has had elevated liver enzymes in the past. Please ask her to follow up with Dr. Katelin Kwon as she has done in the past.       Thank you!

## 2023-05-16 ENCOUNTER — TELEPHONE (OUTPATIENT)
Dept: ENDOCRINOLOGY CLINIC | Facility: CLINIC | Age: 68
End: 2023-05-16

## 2023-05-16 NOTE — TELEPHONE ENCOUNTER
Received updated BG log via mail. BG readings reviewed and noted. Fastin, 110, 114, 137, 120, 147, 101, 110, 104, 89, 107  Before dinner: 125, 139, 135, 150, 148, 100, 151, 118, 164, 121, 164,         Please call patient to notify her that I have reviewed her BG readings and they are at goal. She should continue with current medication regimen. No changes. Thank you!

## 2023-05-17 NOTE — TELEPHONE ENCOUNTER
Spoke to patient to relay message below - patient stated understanding and will continue on current regimen

## 2023-05-19 ENCOUNTER — OFFICE VISIT (OUTPATIENT)
Dept: FAMILY MEDICINE CLINIC | Facility: CLINIC | Age: 68
End: 2023-05-19

## 2023-05-19 VITALS
DIASTOLIC BLOOD PRESSURE: 70 MMHG | HEIGHT: 60 IN | HEART RATE: 80 BPM | SYSTOLIC BLOOD PRESSURE: 105 MMHG | WEIGHT: 126.19 LBS | BODY MASS INDEX: 24.77 KG/M2

## 2023-05-19 DIAGNOSIS — R79.89 ELEVATED LIVER FUNCTION TESTS: Primary | ICD-10-CM

## 2023-05-19 DIAGNOSIS — E11.65 TYPE 2 DIABETES MELLITUS WITH HYPERGLYCEMIA, WITH LONG-TERM CURRENT USE OF INSULIN (HCC): ICD-10-CM

## 2023-05-19 DIAGNOSIS — Z79.4 TYPE 2 DIABETES MELLITUS WITH HYPERGLYCEMIA, WITH LONG-TERM CURRENT USE OF INSULIN (HCC): ICD-10-CM

## 2023-05-19 PROCEDURE — 99214 OFFICE O/P EST MOD 30 MIN: CPT | Performed by: FAMILY MEDICINE

## 2023-05-19 PROCEDURE — 3072F LOW RISK FOR RETINOPATHY: CPT | Performed by: FAMILY MEDICINE

## 2023-05-20 NOTE — TELEPHONE ENCOUNTER
Action Requested: Summary for Provider     []  Critical Lab, Recommendations Needed  [] Need Additional Advice  []   FYI    []   Need Orders  [] Need Medications Sent to Pharmacy  []  Other     SUMMARY:    I suggested to call 911 but the patient was scream 29 yo m pw lower abd pain RLQ aching mod in severity began over the last 2 hr radiating to the R lower abd pain w/o vomiting woke pt up from sleep, no vomiting, no change in stools, no dysuria, no urgency, no frequency.    I have reviewed available current nursing and previous documentation of past medical, surgical, family, and/or social history. 27 yo m pw lower abd pain RLQ aching mod in severity began over the last 2 hr radiating to the R lower abd pain radiating to the R flank w/o vomiting woke pt up from sleep, no vomiting, no change in stools, no dysuria, no urgency, no frequency.    I have reviewed available current nursing and previous documentation of past medical, surgical, family, and/or social history.

## 2023-05-23 ENCOUNTER — TELEPHONE (OUTPATIENT)
Dept: ENDOCRINOLOGY CLINIC | Facility: CLINIC | Age: 68
End: 2023-05-23

## 2023-05-23 NOTE — TELEPHONE ENCOUNTER
Also see 5/8/2023 SWATI Renteria has additional questions regarding lab results. Please call. Thank you.

## 2023-05-25 NOTE — TELEPHONE ENCOUNTER
MI - [place]    [phone] spoke to  Ventura Lira, (full verbal). He just wanted clarification as to why pt needed to see PCP - the PCP told pt/ that liver enzyme numbers are not concerning until they are 20 points above normal.     RN explained to  that NP Pretty Hartman was being cautious due to the liver not being her specialty. Further explained that as we get older and our medication list gets longer, there is always a chance that a person's liver might not like it, better to be on the safe side.  v/u and thanked us for the call back.

## 2023-05-26 NOTE — TELEPHONE ENCOUNTER
Pt  was called and informed of Dr. Francisco Diaz message below and he verbalized understanding.  Pt will have the blood work done on Thursday

## 2023-05-30 RX ORDER — INSULIN GLARGINE AND LIXISENATIDE 100; 33 U/ML; UG/ML
INJECTION, SOLUTION SUBCUTANEOUS
Qty: 21 ML | Refills: 0 | Status: SHIPPED | OUTPATIENT
Start: 2023-05-30 | End: 2023-06-05

## 2023-05-30 NOTE — TELEPHONE ENCOUNTER
LOV: 1/26/2023    RTC: 6 months     F/U: 8/3/2023    LACEY Hernandes-- orders pending, approve if appropriate.

## 2023-06-01 ENCOUNTER — LAB ENCOUNTER (OUTPATIENT)
Dept: LAB | Age: 68
End: 2023-06-01
Attending: FAMILY MEDICINE
Payer: MEDICARE

## 2023-06-01 DIAGNOSIS — R79.89 ELEVATED LIVER FUNCTION TESTS: ICD-10-CM

## 2023-06-01 LAB
ALBUMIN SERPL-MCNC: 3.4 G/DL (ref 3.4–5)
ALP LIVER SERPL-CCNC: 91 U/L
ALT SERPL-CCNC: 57 U/L
AST SERPL-CCNC: 33 U/L (ref 15–37)
BILIRUB DIRECT SERPL-MCNC: 0.2 MG/DL (ref 0–0.2)
BILIRUB SERPL-MCNC: 0.6 MG/DL (ref 0.1–2)
HAV AB SER QL IA: NONREACTIVE
HBV CORE AB SERPL QL IA: NONREACTIVE
HBV SURFACE AB SER QL: NONREACTIVE
HBV SURFACE AB SERPL IA-ACNC: <3.1 MIU/ML
HBV SURFACE AG SERPL QL IA: NONREACTIVE
HCV AB SERPL QL IA: NONREACTIVE
PROT SERPL-MCNC: 6.5 G/DL (ref 6.4–8.2)

## 2023-06-01 PROCEDURE — 87340 HEPATITIS B SURFACE AG IA: CPT

## 2023-06-01 PROCEDURE — 80076 HEPATIC FUNCTION PANEL: CPT

## 2023-06-01 PROCEDURE — 86706 HEP B SURFACE ANTIBODY: CPT

## 2023-06-01 PROCEDURE — 86803 HEPATITIS C AB TEST: CPT

## 2023-06-01 PROCEDURE — 86704 HEP B CORE ANTIBODY TOTAL: CPT

## 2023-06-01 PROCEDURE — 80503 PATH CLIN CONSLTJ SF 5-20: CPT

## 2023-06-01 PROCEDURE — 86708 HEPATITIS A ANTIBODY: CPT

## 2023-06-01 PROCEDURE — 36415 COLL VENOUS BLD VENIPUNCTURE: CPT

## 2023-06-05 RX ORDER — INSULIN GLARGINE AND LIXISENATIDE 100; 33 U/ML; UG/ML
INJECTION, SOLUTION SUBCUTANEOUS
Qty: 21 ML | Refills: 0 | Status: SHIPPED | OUTPATIENT
Start: 2023-06-05

## 2023-06-19 ENCOUNTER — ORDER TRANSCRIPTION (OUTPATIENT)
Dept: ADMINISTRATIVE | Facility: HOSPITAL | Age: 68
End: 2023-06-19

## 2023-06-19 DIAGNOSIS — Z12.31 ENCOUNTER FOR SCREENING MAMMOGRAM FOR MALIGNANT NEOPLASM OF BREAST: Primary | ICD-10-CM

## 2023-06-20 ENCOUNTER — TELEPHONE (OUTPATIENT)
Dept: ENDOCRINOLOGY CLINIC | Facility: CLINIC | Age: 68
End: 2023-06-20

## 2023-06-20 NOTE — TELEPHONE ENCOUNTER
BG readings received via mail and were reviewed. Fasting readings range in 90s to 120s; 1x reading noted of 142  Before dinner readings range between 90s to 120s; 1x reading noted of 159      These readings are all stable. Would recommend that she continues with current medication regimen/doses. No changes needed. Thank you!

## 2023-06-21 NOTE — TELEPHONE ENCOUNTER
Spoke with patient and let her know readings are stable and continue with current medication regimen. Pt agreeable to plan.

## 2023-07-19 ENCOUNTER — HOSPITAL ENCOUNTER (OUTPATIENT)
Dept: MAMMOGRAPHY | Age: 68
Discharge: HOME | End: 2023-07-19
Attending: FAMILY MEDICINE
Payer: MEDICARE

## 2023-07-19 DIAGNOSIS — Z12.31 ENCOUNTER FOR SCREENING MAMMOGRAM FOR MALIGNANT NEOPLASM OF BREAST: ICD-10-CM

## 2023-07-19 PROCEDURE — 77067 SCR MAMMO BI INCL CAD: CPT | Performed by: FAMILY MEDICINE

## 2023-07-19 PROCEDURE — 77063 BREAST TOMOSYNTHESIS BI: CPT | Performed by: FAMILY MEDICINE

## 2023-08-03 ENCOUNTER — OFFICE VISIT (OUTPATIENT)
Dept: ENDOCRINOLOGY CLINIC | Facility: CLINIC | Age: 68
End: 2023-08-03

## 2023-08-03 VITALS
SYSTOLIC BLOOD PRESSURE: 134 MMHG | HEIGHT: 60 IN | BODY MASS INDEX: 24.94 KG/M2 | HEART RATE: 66 BPM | WEIGHT: 127 LBS | DIASTOLIC BLOOD PRESSURE: 75 MMHG

## 2023-08-03 DIAGNOSIS — Z79.4 TYPE 2 DIABETES MELLITUS WITH HYPERGLYCEMIA, WITH LONG-TERM CURRENT USE OF INSULIN (HCC): Primary | ICD-10-CM

## 2023-08-03 DIAGNOSIS — E11.65 TYPE 2 DIABETES MELLITUS WITH HYPERGLYCEMIA, WITH LONG-TERM CURRENT USE OF INSULIN (HCC): Primary | ICD-10-CM

## 2023-08-03 LAB
CARTRIDGE LOT#: ABNORMAL NUMERIC
GLUCOSE BLOOD: 137
HEMOGLOBIN A1C: 8 % (ref 4.3–5.6)
TEST STRIP LOT #: NORMAL NUMERIC

## 2023-08-03 PROCEDURE — 83036 HEMOGLOBIN GLYCOSYLATED A1C: CPT | Performed by: NURSE PRACTITIONER

## 2023-08-03 PROCEDURE — 99214 OFFICE O/P EST MOD 30 MIN: CPT | Performed by: NURSE PRACTITIONER

## 2023-08-03 PROCEDURE — 82947 ASSAY GLUCOSE BLOOD QUANT: CPT | Performed by: NURSE PRACTITIONER

## 2023-08-03 NOTE — PATIENT INSTRUCTIONS
A1C: 8.0% today --> increased from 7.7% on 1/26/2023  Blood glucose: 137 in clinic today    Medications:   -continue with Soliqua 22 units once daily in the morning       - lets work on reducing rice portion to 1/2 and reduce bread intake   - lets increase water intake to 60oz per day   - increase walking to 5x weekly for at least 30-45mins each session     A1C goal:  <7.5%    Blood sugar testing:  Test your blood sugar 1 time daily   Recommended times to test: Before breakfast (fasting) or before dinner     Blood sugar targets:  Before breakfast:  (preferably < 110)  Before meals OR 2 hours after meals: <180 (preferably <150)     Call for persistent blood sugars < 75 or > 200.

## 2023-09-04 ENCOUNTER — APPOINTMENT (OUTPATIENT)
Dept: GENERAL RADIOLOGY | Age: 68
End: 2023-09-04
Attending: NURSE PRACTITIONER
Payer: MEDICARE

## 2023-09-04 ENCOUNTER — HOSPITAL ENCOUNTER (OUTPATIENT)
Age: 68
Discharge: HOME | End: 2023-09-04
Payer: MEDICARE

## 2023-09-04 VITALS
TEMPERATURE: 98 F | OXYGEN SATURATION: 98 % | HEART RATE: 65 BPM | RESPIRATION RATE: 16 BRPM | SYSTOLIC BLOOD PRESSURE: 142 MMHG | DIASTOLIC BLOOD PRESSURE: 66 MMHG

## 2023-09-04 DIAGNOSIS — S79.911A INJURY OF RIGHT HIP, INITIAL ENCOUNTER: ICD-10-CM

## 2023-09-04 DIAGNOSIS — S93.601A SPRAIN OF RIGHT FOOT, INITIAL ENCOUNTER: ICD-10-CM
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
## 2023-09-04 DIAGNOSIS — S93.401A MILD SPRAIN OF RIGHT ANKLE, INITIAL ENCOUNTER: ICD-10-CM

## 2023-09-04 PROCEDURE — 73502 X-RAY EXAM HIP UNI 2-3 VIEWS: CPT | Performed by: NURSE PRACTITIONER

## 2023-09-04 PROCEDURE — 73630 X-RAY EXAM OF FOOT: CPT | Performed by: NURSE PRACTITIONER

## 2023-09-04 PROCEDURE — 73610 X-RAY EXAM OF ANKLE: CPT | Performed by: NURSE PRACTITIONER

## 2023-09-04 PROCEDURE — 99213 OFFICE O/P EST LOW 20 MIN: CPT | Performed by: NURSE PRACTITIONER

## 2023-09-04 NOTE — ED INITIAL ASSESSMENT (HPI)
Pt with R foot and R hip pain since last night. Pt stated that she tripped over a fan and fell to ground. Pt denied hitting head and LOC.

## 2023-09-04 NOTE — DISCHARGE INSTRUCTIONS
Ace wrap/p in 2 to 3 days. Follow-up with orthopedic specialist if you continue to have foot or ankle or hip pain. If you wake up toes are blue and not pink cannot feel your lower extremities inability to ambulate or any new or worsening symptoms you unintentionally urinate or have a bowel movement on yourself or develop pain where you did not initially have it go to the nearest emergency department. Ostop shoe off at night and sleep with the leg elevated on a pillow. You may apply ice pack to the foot ankle or hip 2-3 times per day make sure the ice pack is inside of a pillowcase or cloth only for 20 minutes and then off for 20 minutes do not apply ice directly to the skin. Take over-the-counter Tylenol extra strength which is 500 mg 1 tablet every 8 hours for pain.   Follow-up with your primary care provider

## 2023-09-22 ENCOUNTER — OFFICE VISIT (OUTPATIENT)
Dept: FAMILY MEDICINE CLINIC | Facility: CLINIC | Age: 68
End: 2023-09-22

## 2023-09-22 VITALS
OXYGEN SATURATION: 98 % | HEIGHT: 60 IN | HEART RATE: 78 BPM | BODY MASS INDEX: 24.9 KG/M2 | DIASTOLIC BLOOD PRESSURE: 61 MMHG | TEMPERATURE: 97 F | WEIGHT: 126.81 LBS | SYSTOLIC BLOOD PRESSURE: 94 MMHG

## 2023-09-22 DIAGNOSIS — S93.601D SPRAIN OF RIGHT FOOT, SUBSEQUENT ENCOUNTER: Primary | ICD-10-CM

## 2023-09-22 PROCEDURE — 99213 OFFICE O/P EST LOW 20 MIN: CPT | Performed by: FAMILY MEDICINE

## 2023-09-27 ENCOUNTER — TELEPHONE (OUTPATIENT)
Dept: ENDOCRINOLOGY CLINIC | Facility: CLINIC | Age: 68
End: 2023-09-27

## 2023-10-14 ENCOUNTER — TELEPHONE (OUTPATIENT)
Dept: FAMILY MEDICINE CLINIC | Facility: CLINIC | Age: 68
End: 2023-10-14

## 2023-10-14 NOTE — TELEPHONE ENCOUNTER
Due for dexa scan, medicare phyiscal, colonoscopy, dm A1c, covid flu shingles   Pneumo       Completed   DM eye exam 3/2/2024   Mammogram 7/19/2024  Dm foot exam 8/3/2023      See TE 10/14/2023 letter mailed, called pt spoke with  to schedule appt on 11/6/2023 at 6 pm

## 2023-10-20 NOTE — TELEPHONE ENCOUNTER
Spoke to , on verbal release, verbalized understanding and acknowledged. Confirmed that they will be attending 11/9/23 appt.

## 2023-10-30 ENCOUNTER — TELEPHONE (OUTPATIENT)
Dept: ENDOCRINOLOGY CLINIC | Facility: CLINIC | Age: 68
End: 2023-10-30

## 2023-10-30 NOTE — TELEPHONE ENCOUNTER
Received fax from [employer] attached is [address] form for Diabetes supplies, form placed in provider folder for review and signature.

## 2023-11-06 ENCOUNTER — OFFICE VISIT (OUTPATIENT)
Dept: FAMILY MEDICINE CLINIC | Facility: CLINIC | Age: 68
End: 2023-11-06

## 2023-11-06 VITALS
BODY MASS INDEX: 25.06 KG/M2 | HEIGHT: 60 IN | DIASTOLIC BLOOD PRESSURE: 57 MMHG | HEART RATE: 72 BPM | SYSTOLIC BLOOD PRESSURE: 91 MMHG | WEIGHT: 127.63 LBS | TEMPERATURE: 98 F | OXYGEN SATURATION: 98 %

## 2023-11-06 DIAGNOSIS — I50.22 CHRONIC SYSTOLIC HEART FAILURE (HCC): ICD-10-CM

## 2023-11-06 DIAGNOSIS — Z00.00 ENCOUNTER FOR ANNUAL HEALTH EXAMINATION: ICD-10-CM

## 2023-11-06 DIAGNOSIS — E11.65 TYPE 2 DIABETES MELLITUS WITH HYPERGLYCEMIA, WITH LONG-TERM CURRENT USE OF INSULIN (HCC): Primary | ICD-10-CM

## 2023-11-06 DIAGNOSIS — E78.00 PURE HYPERCHOLESTEROLEMIA: ICD-10-CM

## 2023-11-06 DIAGNOSIS — Z95.810 PRESENCE OF CARDIAC DEFIBRILLATOR: ICD-10-CM

## 2023-11-06 DIAGNOSIS — I25.10 CORONARY ARTERY DISEASE INVOLVING NATIVE CORONARY ARTERY OF NATIVE HEART WITHOUT ANGINA PECTORIS: ICD-10-CM

## 2023-11-06 DIAGNOSIS — Z79.4 TYPE 2 DIABETES MELLITUS WITH HYPERGLYCEMIA, WITH LONG-TERM CURRENT USE OF INSULIN (HCC): Primary | ICD-10-CM

## 2023-11-06 DIAGNOSIS — Z78.0 POST-MENOPAUSAL: ICD-10-CM

## 2023-11-06 DIAGNOSIS — I25.5 ISCHEMIC CARDIOMYOPATHY: ICD-10-CM

## 2023-11-06 DIAGNOSIS — F41.1 GENERALIZED ANXIETY DISORDER: ICD-10-CM

## 2023-11-06 DIAGNOSIS — F31.4 SEVERE BIPOLAR I DISORDER, MOST RECENT EPISODE DEPRESSED (HCC): ICD-10-CM

## 2023-11-09 ENCOUNTER — OFFICE VISIT (OUTPATIENT)
Dept: ENDOCRINOLOGY CLINIC | Facility: CLINIC | Age: 68
End: 2023-11-09

## 2023-11-09 VITALS
WEIGHT: 124 LBS | SYSTOLIC BLOOD PRESSURE: 97 MMHG | BODY MASS INDEX: 24.35 KG/M2 | DIASTOLIC BLOOD PRESSURE: 64 MMHG | HEART RATE: 80 BPM | HEIGHT: 60 IN

## 2023-11-09 DIAGNOSIS — E11.9 TYPE 2 DIABETES MELLITUS WITHOUT COMPLICATION, WITH LONG-TERM CURRENT USE OF INSULIN (HCC): Primary | ICD-10-CM

## 2023-11-09 DIAGNOSIS — Z79.4 TYPE 2 DIABETES MELLITUS WITHOUT COMPLICATION, WITH LONG-TERM CURRENT USE OF INSULIN (HCC): Primary | ICD-10-CM

## 2023-11-09 LAB
CARTRIDGE LOT#: ABNORMAL NUMERIC
GLUCOSE BLOOD: 186
HEMOGLOBIN A1C: 8.3 % (ref 4.3–5.6)
TEST STRIP LOT #: NORMAL NUMERIC

## 2023-11-09 PROCEDURE — 82947 ASSAY GLUCOSE BLOOD QUANT: CPT | Performed by: NURSE PRACTITIONER

## 2023-11-09 PROCEDURE — 83036 HEMOGLOBIN GLYCOSYLATED A1C: CPT | Performed by: NURSE PRACTITIONER

## 2023-11-09 PROCEDURE — 99214 OFFICE O/P EST MOD 30 MIN: CPT | Performed by: NURSE PRACTITIONER

## 2023-11-09 RX ORDER — METFORMIN HYDROCHLORIDE 500 MG/1
500 TABLET, EXTENDED RELEASE ORAL
Qty: 30 TABLET | Refills: 0 | Status: SHIPPED | OUTPATIENT
Start: 2023-11-09 | End: 2023-11-13

## 2023-11-13 ENCOUNTER — TELEPHONE (OUTPATIENT)
Dept: ENDOCRINOLOGY CLINIC | Facility: CLINIC | Age: 68
End: 2023-11-13

## 2023-11-13 RX ORDER — METFORMIN HYDROCHLORIDE 500 MG/1
500 TABLET, EXTENDED RELEASE ORAL
Qty: 90 TABLET | Refills: 0 | Status: SHIPPED | OUTPATIENT
Start: 2023-11-13

## 2023-11-13 NOTE — TELEPHONE ENCOUNTER
Pharmacy asking for a 90 day fill per pt insurance.        metFORMIN  MG Oral Tablet 24 Hr, Take 1 tablet (500 mg total) by mouth daily with dinner., Disp: 30 tablet, Rfl: 0

## 2023-11-14 ENCOUNTER — TELEPHONE (OUTPATIENT)
Dept: ENDOCRINOLOGY CLINIC | Facility: CLINIC | Age: 68
End: 2023-11-14

## 2023-11-14 NOTE — TELEPHONE ENCOUNTER
Pts  called with sugar readings:     11/10/23 104  10am   11/11/23  157  5pm   11/12/23  129 10am   11/13/23  156 5pm   11/14/23  109 10am     Please call.

## 2023-11-15 NOTE — TELEPHONE ENCOUNTER
SANDRO Loyola advise updated BG    -continue with Soliqua 22 units once daily in AM (before first meal)   - start Metformin ER 500mg with dinner meal   ---> (take during or right after dinner meal)

## 2023-11-15 NOTE — TELEPHONE ENCOUNTER
Gretchen Orta noted. BG readings improved. Please ask patient to increase soliqua dose to 25 units daily. Thank you!

## 2023-11-16 NOTE — TELEPHONE ENCOUNTER
Spoke to patient's  on verbal release and gave insulin adjustments per Merrick Gottlieb below. Patient verbalized understanding, did not have any additional questions.

## 2023-12-05 RX ORDER — INSULIN GLARGINE AND LIXISENATIDE 100; 33 U/ML; UG/ML
INJECTION, SOLUTION SUBCUTANEOUS
Qty: 21 ML | Refills: 0 | Status: SHIPPED | OUTPATIENT
Start: 2023-12-05

## 2023-12-05 RX ORDER — PEN NEEDLE, DIABETIC 32GX 5/32"
NEEDLE, DISPOSABLE MISCELLANEOUS
Qty: 100 EACH | Refills: 1 | Status: SHIPPED | OUTPATIENT
Start: 2023-12-05

## 2023-12-11 ENCOUNTER — TELEPHONE (OUTPATIENT)
Dept: ENDOCRINOLOGY CLINIC | Facility: CLINIC | Age: 68
End: 2023-12-11

## 2023-12-11 RX ORDER — LANCETS 33 GAUGE
1 EACH MISCELLANEOUS 2 TIMES DAILY
Qty: 200 EACH | Refills: 0 | Status: SHIPPED | OUTPATIENT
Start: 2023-12-11

## 2023-12-11 RX ORDER — BLOOD SUGAR DIAGNOSTIC
2 STRIP MISCELLANEOUS 2 TIMES DAILY
Qty: 200 STRIP | Refills: 0 | Status: SHIPPED | OUTPATIENT
Start: 2023-12-11

## 2023-12-13 NOTE — TELEPHONE ENCOUNTER
Reviewed readings. She did have a few sugars int eh 70's fasting. Please ask her to continue current dose of Metformin but decrease Soliqua to 23 units subcutaneous daily.

## 2023-12-18 NOTE — TELEPHONE ENCOUNTER
Spoke to patient and relayed message below. Patient verbalized understanding and had no further questions at this time.

## 2024-02-27 RX ORDER — INSULIN GLARGINE AND LIXISENATIDE 100; 33 U/ML; UG/ML
20 INJECTION, SOLUTION SUBCUTANEOUS DAILY
Qty: 18 ML | Refills: 0 | Status: SHIPPED | OUTPATIENT
Start: 2024-02-27

## 2024-02-27 NOTE — TELEPHONE ENCOUNTER
LOV: 11/09/2023    RTC: 3 Months    FU: 03/07/2024    Last Refill: 12/05/2023    3 Month Supply Pending      Please approve if applicable

## 2024-03-07 ENCOUNTER — OFFICE VISIT (OUTPATIENT)
Dept: ENDOCRINOLOGY CLINIC | Facility: CLINIC | Age: 69
End: 2024-03-07

## 2024-03-07 VITALS
WEIGHT: 128 LBS | BODY MASS INDEX: 25 KG/M2 | HEART RATE: 89 BPM | SYSTOLIC BLOOD PRESSURE: 103 MMHG | DIASTOLIC BLOOD PRESSURE: 66 MMHG

## 2024-03-07 DIAGNOSIS — E11.65 TYPE 2 DIABETES MELLITUS WITH HYPERGLYCEMIA, WITH LONG-TERM CURRENT USE OF INSULIN (HCC): Primary | ICD-10-CM

## 2024-03-07 DIAGNOSIS — Z79.4 TYPE 2 DIABETES MELLITUS WITH HYPERGLYCEMIA, WITH LONG-TERM CURRENT USE OF INSULIN (HCC): Primary | ICD-10-CM

## 2024-03-07 LAB
CARTRIDGE LOT#: ABNORMAL NUMERIC
GLUCOSE BLOOD: 123
HEMOGLOBIN A1C: 8 % (ref 4.3–5.6)
TEST STRIP LOT #: NORMAL NUMERIC

## 2024-03-07 PROCEDURE — 82947 ASSAY GLUCOSE BLOOD QUANT: CPT | Performed by: NURSE PRACTITIONER

## 2024-03-07 PROCEDURE — 99214 OFFICE O/P EST MOD 30 MIN: CPT | Performed by: NURSE PRACTITIONER

## 2024-03-07 PROCEDURE — 83036 HEMOGLOBIN GLYCOSYLATED A1C: CPT | Performed by: NURSE PRACTITIONER

## 2024-03-07 RX ORDER — METFORMIN HYDROCHLORIDE 500 MG/1
1000 TABLET, EXTENDED RELEASE ORAL
Qty: 180 TABLET | Refills: 0 | Status: SHIPPED | OUTPATIENT
Start: 2024-03-07 | End: 2024-06-05

## 2024-03-07 NOTE — PATIENT INSTRUCTIONS
A1C: 8.0% today --> decreased from 8.3% on 11/9/2023  Blood glucose: 123 in clinic today    Medications:   -continue with Soliqua 22 units once daily in AM (before first meal)   - increase Metformin  --> 1,000mg with dinner meal     -continue to work on following a low carb diet   -increase walking as much as safely able     Weight:  Wt Readings from Last 6 Encounters:   03/07/24 128 lb (58.1 kg)   11/09/23 124 lb (56.2 kg)   11/06/23 127 lb 9.6 oz (57.9 kg)   09/22/23 126 lb 12.8 oz (57.5 kg)   08/03/23 127 lb (57.6 kg)   05/19/23 126 lb 3.2 oz (57.2 kg)     A1C goal:  <8.0%    Blood sugar testing:  Test your blood sugar 1 time daily   Recommended times to test: alternate with before breakfast (fasting) or 2hrs after meals     Blood sugar targets:  Before breakfast:   (preferably < 110)  Before meals OR 2 hours after meals: <180 (preferably <150)     Call for persistent blood sugars < 75 or > 200

## 2024-03-14 RX ORDER — LANCETS 33 GAUGE
1 EACH MISCELLANEOUS 2 TIMES DAILY
Qty: 200 EACH | Refills: 0 | Status: SHIPPED | OUTPATIENT
Start: 2024-03-14

## 2024-04-09 ENCOUNTER — TELEPHONE (OUTPATIENT)
Dept: ENDOCRINOLOGY CLINIC | Facility: CLINIC | Age: 69
End: 2024-04-09

## 2024-04-09 NOTE — TELEPHONE ENCOUNTER
Called pt and spoke with  per MATTHEW. Advised on decrease of MTF to 500 mg with dinner and to keep Soliqua 22 units. Pt verbalized understanding. No further questions at this time.

## 2024-04-09 NOTE — TELEPHONE ENCOUNTER
Noted. I am sorry to hear that the higher dose of MTF has not made her feel well.     Ok to decrease MTF to 1 tablet (500mg) after dinner.     - continue with current Soliqua dose (22 units daily).       Thank you!

## 2024-04-09 NOTE — TELEPHONE ENCOUNTER
Spoke with patient's  and he reported these blood sugars and also side effects of metformin.       3/25/24              fastin  3/26/24              before dinner: 103  3/28/24              before dinner: 99  3/29/24              fastin  3/30/24              before dinner: 127   3/31/24              fastin  24                before dinner: 123  24                fastin  4/3/24                before dinner: 95  24                fastin   before dinner: 123  24                fastin  24                before dinner: 109  24                fastin  24                before dinner: 118   24                fastin    Confirms medications:   Soliqua: 22 units every morning  Metformin 1,000mg after dinner  - She started having diarrhea on and off since starting increased metformin dose. Most severe diarrhea this morning. She has lost 3 pounds he thinks from diarrhea. Very minimal side effects on 500mg metformin dose.

## 2024-04-09 NOTE — TELEPHONE ENCOUNTER
Pts  states that pt has been taking 2 tabs of Metformin after eating and is having diarrhea after that.  She did not have this issue when taking 1 tab.  She has lost 3 lbs.     3/25/24  86  3/26/24  103  3/28/24  99  3/29/24  102  3/30/24  127   4/1/24  123  4/2/24  89  4/3/24  95  4/4/24  AM 74   4/5/24  107  4/6/24  109  4/7/24  128  4/8/24  118   4/9/24  87    Please call.

## 2024-04-11 ENCOUNTER — TELEPHONE (OUTPATIENT)
Dept: FAMILY MEDICINE CLINIC | Facility: CLINIC | Age: 69
End: 2024-04-11

## 2024-04-22 ENCOUNTER — HOSPITAL ENCOUNTER (OUTPATIENT)
Dept: BONE DENSITY | Age: 69
Discharge: HOME | End: 2024-04-22
Attending: FAMILY MEDICINE
Payer: MEDICARE

## 2024-04-22 ENCOUNTER — TELEPHONE (OUTPATIENT)
Dept: FAMILY MEDICINE CLINIC | Facility: CLINIC | Age: 69
End: 2024-04-22

## 2024-04-22 DIAGNOSIS — Z78.0 POST-MENOPAUSAL: ICD-10-CM

## 2024-04-22 PROCEDURE — 77080 DXA BONE DENSITY AXIAL: CPT | Performed by: FAMILY MEDICINE

## 2024-04-22 NOTE — TELEPHONE ENCOUNTER
Patient's  Chano (on MATTHEW) states patient got a reminder in [place] to get dialated eye exam.  Patient goes to [facility] every year and has an appointment in July. Chart updated

## 2024-05-20 ENCOUNTER — OFFICE VISIT (OUTPATIENT)
Dept: FAMILY MEDICINE CLINIC | Facility: CLINIC | Age: 69
End: 2024-05-20

## 2024-05-20 VITALS
DIASTOLIC BLOOD PRESSURE: 58 MMHG | HEART RATE: 81 BPM | BODY MASS INDEX: 24.38 KG/M2 | SYSTOLIC BLOOD PRESSURE: 110 MMHG | WEIGHT: 124.19 LBS | HEIGHT: 60 IN | OXYGEN SATURATION: 96 %

## 2024-05-20 DIAGNOSIS — M81.0 AGE-RELATED OSTEOPOROSIS WITHOUT CURRENT PATHOLOGICAL FRACTURE: Primary | ICD-10-CM

## 2024-05-20 PROCEDURE — 99214 OFFICE O/P EST MOD 30 MIN: CPT | Performed by: FAMILY MEDICINE

## 2024-05-20 RX ORDER — ALENDRONATE SODIUM 35 MG/1
35 TABLET ORAL
Qty: 4 TABLET | Refills: 11 | Status: SHIPPED | OUTPATIENT
Start: 2024-05-20

## 2024-05-20 RX ORDER — PEN NEEDLE, DIABETIC 32GX 5/32"
NEEDLE, DISPOSABLE MISCELLANEOUS
Qty: 100 EACH | Refills: 1 | Status: SHIPPED | OUTPATIENT
Start: 2024-05-20

## 2024-05-20 NOTE — TELEPHONE ENCOUNTER
Endocrine Refill protocol for Glucose testing supplies       Protocol Criteria:  Appointment with Endocrinology completed in the last 12 months or scheduled in the next 6 months     Verify appointment has been completed or scheduled in the appropriate timeline. If so can send a 90 day supply with 1 refill.        Last completed office visit: 03/07/24  Next scheduled Follow up: 06/13/24  Future Appointments   Date Time Provider Department Center   5/20/2024  4:30 PM [facility]   6/13/2024 11:00 AM Sherita Anaya APRN [facility]

## 2024-05-20 NOTE — PROGRESS NOTES
Subjective:   Yessy Cordoba is a 69 year old female who presents for Test Results (Pt wants to discuss Dexa results )   Abnormal DEXA scan    History/Other:    Chief Complaint Reviewed and Verified  Nursing Notes Reviewed and   Verified  Tobacco Reviewed  Allergies Reviewed  Medications Reviewed    Problem List Reviewed  Medical History Reviewed  Surgical History   Reviewed  OB Status Reviewed  Family History Reviewed  Social History   Reviewed         Tobacco:  She smoked tobacco in the past but quit greater than 12 months ago.  Social History     Tobacco Use   Smoking Status Former   Smokeless Tobacco Never   Tobacco Comments    2 years        Current Outpatient Medications   Medication Sig Dispense Refill    alendronate 35 MG Oral Tab Take 1 tablet (35 mg total) by mouth every 7 days. 4 tablet 11    Lancets (ONETOUCH DELICA PLUS [id number]) Does not apply Misc 1 strip by In Vitro route 2 (two) times daily. 200 each 0    metFORMIN  MG Oral Tablet 24 Hr Take 2 tablets (1,000 mg total) by mouth daily with dinner. 180 tablet 0    Glucose Blood (ONETOUCH ULTRA) In Vitro Strip 2 each by Other route 2 (two) times daily. 200 strip 0    Glucose Blood (ONETOUCH ULTRA) In Vitro Strip USE AS DIRECTED TWICE DAILY 200 strip 1    Microlet Lancets Does not apply Misc Check blood sugar twice daily 200 each 0    spironolactone 25 MG Oral Tab Take 1 tablet (25 mg total) by mouth daily.      atorvastatin 80 MG Oral Tab Take 1 tablet (80 mg total) by mouth nightly. 30 tablet 3    aspirin 81 MG Oral Tab EC Take 1 tablet (81 mg total) by mouth daily. 30 tablet 3    Metoprolol Succinate ER 50 MG Oral Tablet 24 Hr Take 1 tablet (50 mg total) by mouth Daily Beta Blocker. 30 tablet 3    escitalopram 5 MG Oral Tab Take 1 tablet (5 mg total) by mouth nightly. 30 tablet 0    FreeStyle System Does not apply Kit Contour NEXT meter kit 1 kit 0    BD PEN NEEDLE KRYSTAL 2ND GEN 32G X 4 MM Does not apply Misc USE DAILY AS DIRECTED  100 each 1    Insulin Glargine-Lixisenatide (SOLIQUA) 100-33 UNT-MCG/ML Subcutaneous Solution Pen-injector Inject 20 Units into the skin daily. (Patient not taking: Reported on 5/20/2024) 18 mL 0         Review of Systems:  Review of Systems   Constitutional: Negative.    HENT: Negative.     Eyes: Negative.    Respiratory: Negative.     Cardiovascular: Negative.    Gastrointestinal: Negative.    Genitourinary: Negative.    Musculoskeletal: Negative.    Skin: Negative.    Neurological: Negative.    Psychiatric/Behavioral: Negative.             Objective:   /58 (BP Location: Right arm, Patient Position: Sitting, Cuff Size: adult)   Pulse 81   Ht 5' (1.524 m)   Wt 124 lb 3.2 oz (56.3 kg)   SpO2 96%   BMI 24.26 kg/m²  Estimated body mass index is 24.26 kg/m² as calculated from the following:    Height as of this encounter: 5' (1.524 m).    Weight as of this encounter: 124 lb 3.2 oz (56.3 kg).  Physical Exam  Vitals reviewed.   Constitutional:       Appearance: Normal appearance.   Neurological:      Mental Status: She is alert.             Assessment & Plan:   1. Age-related osteoporosis without current pathological fracture (Primary)  Other orders  -     Alendronate Sodium; Take 1 tablet (35 mg total) by mouth every 7 days.  Dispense: 4 tablet; Refill: 11    Reviewed and explained test results.  Reviewed medication to be started, instructions and side effects.  Also, start calcium 500mg daily, vitamin D3 1,000 units daily and exercise.  Recheck DEXA in 2 years. Likely five years treatment.        No follow-ups on file.    Miguel Figueroa DO, 5/20/2024, 5:07 PM

## 2024-05-28 RX ORDER — INSULIN GLARGINE AND LIXISENATIDE 100; 33 U/ML; UG/ML
20 INJECTION, SOLUTION SUBCUTANEOUS DAILY
Qty: 18 ML | Refills: 1 | Status: SHIPPED | OUTPATIENT
Start: 2024-05-28

## 2024-05-28 NOTE — TELEPHONE ENCOUNTER
Endocrine refill protocol for basal insulins     pass    Protocol Criteria:  - Appointment with Endocrinology completed in the last 6 months or scheduled in the next 3 months    - A1c result completed in the last 6 months and is <8.5%     Verify appointment has been completed or scheduled in the appropriate timeline. If so can send a 90 day supply with 1 refill per provider protocol.    Verify A1c has been completed within the last 6 months and is below 8.5%     Last completed office visit:  Next scheduled Follow up: 06/13/2024     Last A1c result: Last A1c value was 8% done 3/7/2024.     Per last OV pt is taking 22 units not 20 units, please advise in re: to refill request which is request 20 units. Please adjust medication refill if applicable thanks

## 2024-06-12 RX ORDER — LANCETS 33 GAUGE
EACH MISCELLANEOUS
Qty: 200 EACH | Refills: 0 | Status: SHIPPED | OUTPATIENT
Start: 2024-06-12

## 2024-06-12 RX ORDER — METFORMIN HYDROCHLORIDE 500 MG/1
1000 TABLET, EXTENDED RELEASE ORAL
Qty: 180 TABLET | Refills: 0 | Status: SHIPPED | OUTPATIENT
Start: 2024-06-12

## 2024-06-12 NOTE — TELEPHONE ENCOUNTER
Endocrine Refill protocol for Glucose testing supplies       Protocol Criteria:pass  Appointment with Endocrinology completed in the last 12 months or scheduled in the next 6 months     Verify appointment has been completed or scheduled in the appropriate timeline. If so can send a 90 day supply with 1 refill.        Last completed office visit: 03/07/24  Next scheduled Follow up: 06/13/24

## 2024-06-13 ENCOUNTER — OFFICE VISIT (OUTPATIENT)
Dept: ENDOCRINOLOGY CLINIC | Facility: CLINIC | Age: 69
End: 2024-06-13

## 2024-06-13 VITALS
HEART RATE: 77 BPM | DIASTOLIC BLOOD PRESSURE: 74 MMHG | WEIGHT: 128 LBS | SYSTOLIC BLOOD PRESSURE: 110 MMHG | HEIGHT: 62 IN | BODY MASS INDEX: 23.55 KG/M2

## 2024-06-13 DIAGNOSIS — E78.00 PURE HYPERCHOLESTEROLEMIA: ICD-10-CM

## 2024-06-13 DIAGNOSIS — E11.9 TYPE 2 DIABETES MELLITUS WITHOUT COMPLICATION, WITH LONG-TERM CURRENT USE OF INSULIN (HCC): Primary | ICD-10-CM

## 2024-06-13 DIAGNOSIS — Z79.4 TYPE 2 DIABETES MELLITUS WITHOUT COMPLICATION, WITH LONG-TERM CURRENT USE OF INSULIN (HCC): Primary | ICD-10-CM

## 2024-06-13 LAB
GLUCOSE BLOOD: 155
HEMOGLOBIN A1C: 7.1 % (ref 4.3–5.6)
TEST STRIP LOT #: NORMAL NUMERIC

## 2024-06-13 PROCEDURE — 82947 ASSAY GLUCOSE BLOOD QUANT: CPT | Performed by: NURSE PRACTITIONER

## 2024-06-13 PROCEDURE — 99213 OFFICE O/P EST LOW 20 MIN: CPT | Performed by: NURSE PRACTITIONER

## 2024-06-13 PROCEDURE — 83036 HEMOGLOBIN GLYCOSYLATED A1C: CPT | Performed by: NURSE PRACTITIONER

## 2024-06-13 NOTE — PROGRESS NOTES
Name: Yessy Cordoba  Date: 2024    CHIEF COMPLAINT   Chief Complaint   Patient presents with    Diabetes     Pt is here for follow up for diabetes and A1c check       HISTORY OF PRESENT ILLNESS   Yessy Cordoba is a 69 year old female who presents for follow up on diabetes management. Patient is accompanied by her , Jose Alberto (), who is also helping answer questions.   HbA1C: 7.1% at POC today. This is increased from 8.0% on 3/7/2024.   Blood glucose is: 155 in clinic today.   Patient notes that she has been feeling well and denies any health changes or taking new medications since last office visit. She continues to follow a low carb diet and staying active per usual. She has been compliant with taking all diabetes medications.     DIABETES HISTORY  Diagnosed: approx. 9 years ago, was prediabetic and had attended classes at Diabetes [facility]. At that time was testing her BG readings, however she was told that she was fine and so she stopped checking since.   During hospitalization on 2020 for STEMI she was diagnosed with diabetes.     Prior HbA, C or glycohemoglobin were 12.7% 2020; 7.9% 2020; 8.6% 2020; 8.0% 2020; 8.4% 3/2021; 8.0% 21; 7.7% 2021; 8.6% 2022; 7.9% 2022; 7.7% 2023; 8.0% 8/3/2023; 8.3% 2023; 8.0% at POC today;     Patient has not had hospitalizations for blood sugar issues and denies any history of pancreatitis    PREVIOUS MEDICATION FOR DM:  Invokanna -d/c'd due to high cost  Tresiba - d/c'ed due to transition to soliqua   Glipizide -d/c'ed due to transition to soliqua     CURRENT MEDICATIONS FOR DM:  -Soliqua 22 units once daily in AM   -Metformin ER 500mg with dinner -tolerating well; denies GI s/e - was not able to tolerate higher dose due to GI s/e    HOME GLUCOSE READINGS:  Testing BG x 1 daily  Log book today: yes  Fastin, 133, 116, 92, 127, 136, 141, 128, 116, 129, 118, 89, 111, 97, 128, 137, 101, 127  Before dinner:  129, 135, 139, 147, 130, 124, 140, 120, 136, 129, 130, 88, 151, 148, 140, 139  Hypoglycemia: no     HISTORY OF DIABETES COMPLICATIONS:  History of Retinopathy: no - last eye exam within the last 12 months: yes-last exam was on  10/5/2023 with Dr. Boston ( [facility])    History of Neuropathy: no   History of Nephropathy: no     ASSOCIATED COMPLICATIONS:   HTN: no   Hyperlipidemia: yes- on statin   Coronary Artery Disease:  Yes -STEMI  Cerebrovascular Disease: no     DIETARY COMPLIANCE:  Fair; eating 2 meals per day (10am and 5pm)  First meal:    - 3 strip BLT (whole wheat bread) with salad;    - ham or tuna sandwich    - scabbed eggs    - toast with peanut butter   - 3 pancakes   Second meal:  - steak; spaghetti; cheese pizza (thin crust); pork loin; fish; hamburger; hot dog; salads; stir stokes veggies with rice     -on occasion eating small cone ice cream   - popcorn or BBQ chips   - on occasion drinking sugar free beverages    EXERCISE:   Yes - walking daily recently (on occasion going to the [employer] for walking).     Polyuria, polyphagia, polydipsia: no  Paresthesias: no  Blurred vision: no  Recent steroids, illness or infections: no     REVIEW OF SYSTEMS  Constitutional: Negative for: weight change, fever, fatigue, cold/heat intolerance  Eyes: Negative for:  Visual changes, proptosis, blurring  Respiratory: Negative for: hemoptysis, shortness of breath, cough, or dyspnea.  Cardiovascular: Negative for:  chest pain, chest discomfort, palpitations  GI: Negative for:  abdominal pain, nausea, vomiting, diarrhea, heartburn, constipation, bleeding  Neurology: Negative for: headache, dizziness, syncope, numbness/tingling, or weakness.   Genito-Urinary: Negative for: dysuria, frequency or hematuria   Hematology/Lymphatics: Negative for: bruising, easy bleeding, lower extremity edema  Endocrine: Negative for: polyuria, polydipsia. No thyroid disease. No osteoporosis.   Skin: Negative for: rash, blister,  infection or ulcers.    MEDICATIONS:     Current Outpatient Medications:     ONETOUCH DELICA PLUS [id number] Does not apply Misc, USE 1 STRIP FOR TESTING TWICE DAILY, Disp: 200 each, Rfl: 0    METFORMIN  MG Oral Tablet 24 Hr, TAKE 2 TABLETS(1000 MG) BY MOUTH DAILY WITH DINNER, Disp: 180 tablet, Rfl: 0    SOLIQUA 100-33 UNT-MCG/ML Subcutaneous Solution Pen-injector, ADMINISTER 20 UNITS UNDER THE SKIN DAILY, Disp: 18 mL, Rfl: 1    BD PEN NEEDLE KRYSTAL 2ND GEN 32G X 4 MM Does not apply Misc, USE DAILY AS DIRECTED, Disp: 100 each, Rfl: 1    alendronate 35 MG Oral Tab, Take 1 tablet (35 mg total) by mouth every 7 days., Disp: 4 tablet, Rfl: 11    Glucose Blood (ONETOUCH ULTRA) In Vitro Strip, 2 each by Other route 2 (two) times daily., Disp: 200 strip, Rfl: 0    Glucose Blood (ONETOUCH ULTRA) In Vitro Strip, USE AS DIRECTED TWICE DAILY, Disp: 200 strip, Rfl: 1    Microlet Lancets Does not apply Misc, Check blood sugar twice daily, Disp: 200 each, Rfl: 0    spironolactone 25 MG Oral Tab, Take 1 tablet (25 mg total) by mouth daily., Disp: , Rfl:     atorvastatin 80 MG Oral Tab, Take 1 tablet (80 mg total) by mouth nightly., Disp: 30 tablet, Rfl: 3    aspirin 81 MG Oral Tab EC, Take 1 tablet (81 mg total) by mouth daily., Disp: 30 tablet, Rfl: 3    Metoprolol Succinate ER 50 MG Oral Tablet 24 Hr, Take 1 tablet (50 mg total) by mouth Daily Beta Blocker., Disp: 30 tablet, Rfl: 3    escitalopram 5 MG Oral Tab, Take 1 tablet (5 mg total) by mouth nightly., Disp: 30 tablet, Rfl: 0    FreeStyle System Does not apply Kit, Contour NEXT meter kit, Disp: 1 kit, Rfl: 0  ALLERGIES:   Allergies   Allergen Reactions    Metformin OTHER (SEE COMMENTS) and UNKNOWN     Causes vomiting        SOCIAL HISTORY:   Social History     Socioeconomic History    Marital status:    Tobacco Use    Smoking status: Former    Smokeless tobacco: Never    Tobacco comments:     2 years   Vaping Use    Vaping status: Never Used   Substance and Sexual  Activity    Alcohol use: No    Drug use: No   Other Topics Concern    Caffeine Concern No     PAST MEDICAL HISTORY:   Past Medical History:    Anxiety state    Bipolar affective (HCC)    Breast cyst    Cataract    Congestive heart disease (HCC)    COPD (chronic obstructive pulmonary disease) (HCC)    Cup to disc asymmetry    increased C/D ratio    CVA (cerebral vascular accident) (HCC)    Diabetes (HCC)    Heart attack (HCC)    Pacemaker    Hemorrhoids    High blood pressure    High cholesterol    No diabetic retinopathy OU    Shingles    Stroke (HCC)     PAST SURGICAL HISTORY:   Past Surgical History:   Procedure Laterality Date    Breast surgery  1977    excision breast cyst    Colonoscopy  2011    Kp localization wire 1 site left (cpt=19281)         PHYSICAL EXAM:   Vitals:    06/13/24 1053   BP: 110/74   Pulse: 77   Weight: 128 lb (58.1 kg)   Height: 5' 2\" (1.575 m)     BMI:   Body mass index is 23.41 kg/m².      General Appearance:  alert, well developed, in no acute distress  Nutritional:  no extreme weight gain or loss  Head: Atraumatic  Eyes:  normal conjunctivae, sclera., normal sclera and normal pupils  Throat/Neck: normal sound to voice. Normal hearing, normal speech  Back: no kyphosis  Respiratory:  Speaking in full sentences, non-labored. no increased work of breathing, no audible wheezing    Skin:  normal moisture and skin texture, no visible lesions  Hair and nails: normal scalp hair  Hematologic:  no excessive bruising  Neuro: motor grossly intact, moving all extremities without difficulty  Psychiatric:  oriented to time, self, and place  Extremities: no obvious extremity swelling, no lesions    Diabetes Foot Exam:  Bilateral barefoot skin diabetic exam is normal, visualized feet and the appearance is normal.  Bilateral monofilament/sensation of both feet is normal.  Pulsation pedal pulse exam of both lower legs/feet is normal as well.      Labs: Pertinent labs reviewed    ASSESSMENT/PLAN:     -Reviewed with patient the pathogenesis of diabetes, clinical significance of A1c, and common complications such as: microvascular, macrovascular and diabetic ketoacidosis. Patient verbalizes understanding of the importance of glycemic control and the goals of therapy.   Per ADA 2024 guidelines: older adults (ages 65 and above) who have multiple coexisting chronic illnesses (3 or more; chronic illness may include: arthritis, cancer, congestive heart failure, depression, emphysema, falls, hypertension, incontinence, stage 3 or worse chronic kidney disease, myocardial infarction, and stroke), or have mild to moderate cognitive impairment, and/or have +2 functional dependence should have less-stringent glycemic goals. A1C target goal: <8.0%; fasting blood sugar readings  and bedtime blood sugar readings 100-180.   1.Type 2 Diabetes Mellitus, controlled   -LAB DATA  HbA,C:  7.1% today   a) Medications  -continue with Soliqua 22 units once daily in AM (before first meal)   - continue with Metformin ER 500mg with dinner meal     -discussed to continue to follow a low carb diet   -discussed to continue walking daily   -reviewed target goal BG readings and A1C  -reviewed when to call with abnormal BG readings.   -unable to use sglt-2 or glp- 1due to high cost.     b) Nephropathy: GFR 59 on 23 and urine MA: 11.8 on 2023 -> repeat labs   c) UTD with annual eye exam with optho. - followed by Dr. Boston; last exam was on 10/5/2023  d) continue testing BG reading 1x daily -alternate with fasting and before dinner   E)foot exam: normal today 2024  f) Life style changes reviewed.     2. Hypertension   -on metoprolol succinate ER 50mg daily, valsartan and spironolactone 12.5mg daily   -normotensive today     -followed by cardiology - Dr. Wright      3.hyperlipidemia   -LDL: 40 and tri on 2023  -on atorvastatin 80mg daily   - repeat lipid panel      RTC in 4 months   Patient instructed to call sooner if  they develop Blood glucose readings <75 and/or if they have readings persistently >200.     The risks and benefits of my recommendations were discussed with the patient today. Questions were also answered to the best of my knowledge. Patient verbalizes understanding of these issues and agrees to the plan.    6/13/2024  LACEY Louis

## 2024-06-13 NOTE — PATIENT INSTRUCTIONS
A1C: 7.1% today --> decreased from 8.0% on 3/7/2024  Blood glucose: 155 in clinic today    Medications:   - continue with Soliqua 22 units once daily in AM (before first meal)   - continue with  Metformin ER 500mg with dinner     - start eating protein with all meals  - continue to follow a low carb diet   - continue to stay active with walking daily   - repeat annual labs - fast for 10hrs prior     Weight:  Wt Readings from Last 6 Encounters:   06/13/24 128 lb (58.1 kg)   05/20/24 124 lb 3.2 oz (56.3 kg)   03/07/24 128 lb (58.1 kg)   11/09/23 124 lb (56.2 kg)   11/06/23 127 lb 9.6 oz (57.9 kg)   09/22/23 126 lb 12.8 oz (57.5 kg)     A1C goal:  <7.5%    Blood sugar testing:  Test your blood sugar 1 time daily   Recommended times to test: Before breakfast (fasting) or 2hrs after meals     Blood sugar targets:  Before breakfast:  (preferably < 110)  Before meals OR 2 hours after meals: <180 (preferably <150)     Call for persistent blood sugars < 75 or > 200

## 2024-06-19 ENCOUNTER — LAB ENCOUNTER (OUTPATIENT)
Dept: LAB | Age: 69
End: 2024-06-19
Attending: NURSE PRACTITIONER

## 2024-06-19 DIAGNOSIS — E11.9 TYPE 2 DIABETES MELLITUS WITHOUT COMPLICATION, WITH LONG-TERM CURRENT USE OF INSULIN (HCC): ICD-10-CM

## 2024-06-19 DIAGNOSIS — Z79.4 TYPE 2 DIABETES MELLITUS WITHOUT COMPLICATION, WITH LONG-TERM CURRENT USE OF INSULIN (HCC): ICD-10-CM

## 2024-06-19 LAB
ALBUMIN SERPL-MCNC: 4.1 G/DL (ref 3.2–4.8)
ALBUMIN/GLOB SERPL: 1.9 {RATIO} (ref 1–2)
ALP LIVER SERPL-CCNC: 91 U/L
ALT SERPL-CCNC: 28 U/L
ANION GAP SERPL CALC-SCNC: 6 MMOL/L (ref 0–18)
AST SERPL-CCNC: 24 U/L (ref ?–34)
BILIRUB SERPL-MCNC: 0.8 MG/DL (ref 0.2–1.1)
BUN BLD-MCNC: 21 MG/DL (ref 9–23)
BUN/CREAT SERPL: 21.2 (ref 10–20)
CALCIUM BLD-MCNC: 9.5 MG/DL (ref 8.7–10.4)
CHLORIDE SERPL-SCNC: 110 MMOL/L (ref 98–112)
CHOLEST SERPL-MCNC: 105 MG/DL (ref ?–200)
CO2 SERPL-SCNC: 28 MMOL/L (ref 21–32)
CREAT BLD-MCNC: 0.99 MG/DL
CREAT UR-SCNC: 88.2 MG/DL
EGFRCR SERPLBLD CKD-EPI 2021: 62 ML/MIN/1.73M2 (ref 60–?)
FASTING PATIENT LIPID ANSWER: YES
FASTING STATUS PATIENT QL REPORTED: YES
GLOBULIN PLAS-MCNC: 2.2 G/DL (ref 2–3.5)
GLUCOSE BLD-MCNC: 111 MG/DL (ref 70–99)
HDLC SERPL-MCNC: 40 MG/DL (ref 40–59)
LDLC SERPL CALC-MCNC: 51 MG/DL (ref ?–100)
MICROALBUMIN UR-MCNC: <0.3 MG/DL
NONHDLC SERPL-MCNC: 65 MG/DL (ref ?–130)
OSMOLALITY SERPL CALC.SUM OF ELEC: 302 MOSM/KG (ref 275–295)
POTASSIUM SERPL-SCNC: 4.1 MMOL/L (ref 3.5–5.1)
PROT SERPL-MCNC: 6.3 G/DL (ref 5.7–8.2)
SODIUM SERPL-SCNC: 144 MMOL/L (ref 136–145)
TRIGL SERPL-MCNC: 67 MG/DL (ref 30–149)
VLDLC SERPL CALC-MCNC: 10 MG/DL (ref 0–30)

## 2024-06-19 PROCEDURE — 36415 COLL VENOUS BLD VENIPUNCTURE: CPT

## 2024-06-19 PROCEDURE — 82043 UR ALBUMIN QUANTITATIVE: CPT

## 2024-06-19 PROCEDURE — 80053 COMPREHEN METABOLIC PANEL: CPT

## 2024-06-19 PROCEDURE — 80061 LIPID PANEL: CPT

## 2024-06-19 PROCEDURE — 82570 ASSAY OF URINE CREATININE: CPT

## 2024-06-21 ENCOUNTER — TELEPHONE (OUTPATIENT)
Dept: ENDOCRINOLOGY CLINIC | Facility: CLINIC | Age: 69
End: 2024-06-21

## 2024-06-21 NOTE — TELEPHONE ENCOUNTER
Endo staff: Please call patient and notify her that her lab results are back and they demonstrate the following (she does not have MyChart)    -Normal electrolytes  -Normal kidney function -mild dehydration was seen in the labs however this is likely due to fasting for the lab.  Please remind patient that she should try to drink around 60 ounces of water per day    -Normal liver function  -Normal cholesterol panel  -Normal amount of protein found in urine      Thank you

## 2024-06-21 NOTE — TELEPHONE ENCOUNTER
Spoke to pt and . Provided results and instructions as written below by Ashtyn RAHMAN. They verbalized understanding. No other questions or concerns.

## 2024-06-24 ENCOUNTER — TELEPHONE (OUTPATIENT)
Dept: FAMILY MEDICINE CLINIC | Facility: CLINIC | Age: 69
End: 2024-06-24

## 2024-06-24 DIAGNOSIS — Z12.31 ENCOUNTER FOR SCREENING MAMMOGRAM FOR MALIGNANT NEOPLASM OF BREAST: Primary | ICD-10-CM

## 2024-06-24 NOTE — TELEPHONE ENCOUNTER
Called patient's  to inform screening mammogram has been order.  Had phone number to schedule        7/19/23 RECOMMENDATIONS:   ROUTINE MAMMOGRAM AND CLINICAL EVALUATION IN 12 MONTHS.

## 2024-06-24 NOTE — TELEPHONE ENCOUNTER
Patient's  Chano calling to request orders from Dr. Figueroa for a mammogram.    Our records show previous mammograms as DEXTER ANJANA 2D+3D SCREENING BILAT ([id number])     Please call back to let him know when orders are available.

## 2024-07-30 ENCOUNTER — HOSPITAL ENCOUNTER (OUTPATIENT)
Dept: MAMMOGRAPHY | Age: 69
Discharge: HOME | End: 2024-07-30
Attending: FAMILY MEDICINE
Payer: MEDICARE

## 2024-07-30 DIAGNOSIS — Z12.31 ENCOUNTER FOR SCREENING MAMMOGRAM FOR MALIGNANT NEOPLASM OF BREAST: ICD-10-CM

## 2024-07-30 PROCEDURE — 77063 BREAST TOMOSYNTHESIS BI: CPT | Performed by: FAMILY MEDICINE

## 2024-07-30 PROCEDURE — 77067 SCR MAMMO BI INCL CAD: CPT | Performed by: FAMILY MEDICINE

## 2024-07-31 ENCOUNTER — TELEPHONE (OUTPATIENT)
Dept: ENDOCRINOLOGY CLINIC | Facility: CLINIC | Age: 69
End: 2024-07-31

## 2024-08-01 NOTE — TELEPHONE ENCOUNTER
BG reading log reviewed and glucose readings are overall at goal.     No episodes of hypoglycemia or hyperglycemia noted.       Please inform patient to continue with current medication regimen/doses.       Thank you

## 2024-08-02 NOTE — TELEPHONE ENCOUNTER
Spoke to pt. Provided results and medication instructions as written below by Sherita RAHMAN. Pt verbalized understanding. No other questions or concerns.

## 2024-09-18 RX ORDER — LANCETS 33 GAUGE
EACH MISCELLANEOUS
Qty: 200 EACH | Refills: 1 | Status: SHIPPED | OUTPATIENT
Start: 2024-09-18

## 2024-09-18 NOTE — TELEPHONE ENCOUNTER
Endocrine Refill protocol for Glucose testing supplies     Protocol Criteria: PASSED Reason: N/A  Appointment with Endocrinology completed in the last 12 months or scheduled in the next 6 months     Verify appointment has been completed or scheduled in the appropriate timeline. If so can send a 90 day supply with 1 refill.     Last completed office visit: 6/13/2024 Sherita Anaya APRN   Next scheduled Follow up:   Future Appointments   Date Time Provider Department Center   11/13/2024 11:45 AM Sherita Anaya APRN [facility]

## 2024-09-23 ENCOUNTER — TELEPHONE (OUTPATIENT)
Dept: ENDOCRINOLOGY CLINIC | Facility: CLINIC | Age: 69
End: 2024-09-23

## 2024-09-27 NOTE — TELEPHONE ENCOUNTER
Spoke to pt. Provided instructions as written below by Sherita RAHMAN. Pt verbalized understanding. Denies further questions or concerns.

## 2024-09-27 NOTE — TELEPHONE ENCOUNTER
BG log reviewed and glucose readings at all at goal.     Please instruct patient/ that she should continue with current medication doses. No changes needed.     Thank you!

## 2024-10-08 RX ORDER — BLOOD SUGAR DIAGNOSTIC
STRIP MISCELLANEOUS 2 TIMES DAILY
Qty: 200 STRIP | Refills: 1 | Status: SHIPPED | OUTPATIENT
Start: 2024-10-08

## 2024-10-08 NOTE — TELEPHONE ENCOUNTER
Endocrine Refill protocol for Glucose testing supplies     Protocol Criteria: PASSED Reason: N/A    If below requirement is met, send a 90-day supply with 1 refill per provider protocol.    Verify appointment with Endocrinology completed in the last 6 months or scheduled in the next 3 months.    Last completed office visit: 6/13/2024 Sherita Anaya APRN   Next scheduled Follow up:   Future Appointments   Date Time Provider            11/13/2024 11:45 AM Sherita Anaya APRN [facility]

## 2024-10-14 ENCOUNTER — TELEPHONE (OUTPATIENT)
Dept: ENDOCRINOLOGY CLINIC | Facility: CLINIC | Age: 69
End: 2024-10-14

## 2024-10-14 NOTE — TELEPHONE ENCOUNTER
Received patient's Diabetes Blood Glucose Record by mail. Placed in providers folder for review.

## 2024-10-16 ENCOUNTER — TELEPHONE (OUTPATIENT)
Dept: ENDOCRINOLOGY CLINIC | Facility: CLINIC | Age: 69
End: 2024-10-16

## 2024-10-16 NOTE — TELEPHONE ENCOUNTER
[facility] requires prior authorization for One Touch Ultra Blue Test Strips.  Please call.  Thank you.

## 2024-10-17 NOTE — TELEPHONE ENCOUNTER
Spoke with pharmacy. States that updated CMN form is needed and pharmacy will have Medicare form faxed to office.     Awaiting fax.

## 2024-10-23 ENCOUNTER — TELEPHONE (OUTPATIENT)
Dept: ENDOCRINOLOGY CLINIC | Facility: CLINIC | Age: 69
End: 2024-10-23

## 2024-10-23 NOTE — TELEPHONE ENCOUNTER
Received fax from [employer] diabetic standard written order dated on 10/22/2024. Placed in providers folder for a signature.

## 2024-11-07 ENCOUNTER — OFFICE VISIT (OUTPATIENT)
Dept: FAMILY MEDICINE CLINIC | Facility: CLINIC | Age: 69
End: 2024-11-07

## 2024-11-07 VITALS
WEIGHT: 129 LBS | DIASTOLIC BLOOD PRESSURE: 75 MMHG | HEART RATE: 75 BPM | HEIGHT: 62 IN | OXYGEN SATURATION: 97 % | BODY MASS INDEX: 23.74 KG/M2 | SYSTOLIC BLOOD PRESSURE: 115 MMHG

## 2024-11-07 DIAGNOSIS — I25.5 ISCHEMIC CARDIOMYOPATHY: ICD-10-CM

## 2024-11-07 DIAGNOSIS — E11.65 TYPE 2 DIABETES MELLITUS WITH HYPERGLYCEMIA, WITH LONG-TERM CURRENT USE OF INSULIN (HCC): ICD-10-CM

## 2024-11-07 DIAGNOSIS — E78.00 PURE HYPERCHOLESTEROLEMIA: ICD-10-CM

## 2024-11-07 DIAGNOSIS — I50.22 CHRONIC SYSTOLIC HEART FAILURE (HCC): Primary | ICD-10-CM

## 2024-11-07 DIAGNOSIS — I25.10 CORONARY ARTERY DISEASE INVOLVING NATIVE CORONARY ARTERY OF NATIVE HEART WITHOUT ANGINA PECTORIS: ICD-10-CM

## 2024-11-07 DIAGNOSIS — M81.0 AGE-RELATED OSTEOPOROSIS WITHOUT CURRENT PATHOLOGICAL FRACTURE: ICD-10-CM

## 2024-11-07 DIAGNOSIS — F41.1 GENERALIZED ANXIETY DISORDER: ICD-10-CM

## 2024-11-07 DIAGNOSIS — Z95.810 PRESENCE OF CARDIAC DEFIBRILLATOR: ICD-10-CM

## 2024-11-07 DIAGNOSIS — F31.4 SEVERE BIPOLAR I DISORDER, MOST RECENT EPISODE DEPRESSED (HCC): ICD-10-CM

## 2024-11-07 DIAGNOSIS — Z12.11 COLON CANCER SCREENING: ICD-10-CM

## 2024-11-07 DIAGNOSIS — Z79.4 TYPE 2 DIABETES MELLITUS WITH HYPERGLYCEMIA, WITH LONG-TERM CURRENT USE OF INSULIN (HCC): ICD-10-CM

## 2024-11-07 NOTE — PROGRESS NOTES
Subjective:   Yessy Cordoba is a 69 year old female who presents for a Subsequent Annual Wellness visit (Pt already had Initial Annual Wellness) and scheduled follow up of multiple significant but stable problems.       History/Other:   Fall Risk Assessment:   She has been screened for Falls and is low risk.      Cognitive Assessment:   She had a completely normal cognitive assessment - see flowsheet entries     Functional Ability/Status:   Yessy Cordoba has some abnormal functions as listed below:  She has Driving difficulties based on screening of functional status. She has difficulties Managing Money/Bills based on screening of functional status.She has difficulties Shopping for Groceries based on screening of functional status. She has difficulties Affording Meds based on screening of functional status.       Depression Screening (PHQ):  PHQ-2 SCORE: 0  , done 11/7/2024             Advanced Directives:   She does have a Living Will but we do NOT have it on file in Epic.    She does have a POA but we do NOT have it on file in Epic.    Discussed Advance Care Planning with patient (and family/surrogate if present). Standard forms made available to patient in After Visit Summary.      Patient Active Problem List   Diagnosis    Type 2 diabetes mellitus with hyperglycemia, with long-term current use of insulin (HCC)    Severe bipolar I disorder, most recent episode depressed (HCC)    Pure hypercholesterolemia    Generalized anxiety disorder    Ischemic cardiomyopathy    Chronic systolic heart failure (HCC)    Coronary artery disease involving native coronary artery of native heart without angina pectoris    Presence of cardiac defibrillator    Age-related osteoporosis without current pathological fracture     Allergies:  She is allergic to metformin.    Current Medications:  Outpatient Medications Marked as Taking for the 11/7/24 encounter (Office Visit) with Miguel Figueroa, DO   Medication Sig    ONETOUCH  ULTRA In Vitro Strip USE TWICE DAILY    ONETOUCH DELICA PLUS [id number] Does not apply Misc USE 1 STRIP FOR TESTING TWICE DAILY    Insulin Glargine-Lixisenatide (SOLIQUA) 100-33 UNT-MCG/ML Subcutaneous Solution Pen-injector Inject 22 Units/day into the skin daily.    METFORMIN  MG Oral Tablet 24 Hr TAKE 2 TABLETS(1000 MG) BY MOUTH DAILY WITH DINNER    BD PEN NEEDLE KRYSTAL 2ND GEN 32G X 4 MM Does not apply Misc USE DAILY AS DIRECTED    alendronate 35 MG Oral Tab Take 1 tablet (35 mg total) by mouth every 7 days.    Glucose Blood (ONETOUCH ULTRA) In Vitro Strip USE AS DIRECTED TWICE DAILY    Microlet Lancets Does not apply Misc Check blood sugar twice daily    spironolactone 25 MG Oral Tab Take 1 tablet (25 mg total) by mouth daily.    atorvastatin 80 MG Oral Tab Take 1 tablet (80 mg total) by mouth nightly.    aspirin 81 MG Oral Tab EC Take 1 tablet (81 mg total) by mouth daily.    Metoprolol Succinate ER 50 MG Oral Tablet 24 Hr Take 1 tablet (50 mg total) by mouth Daily Beta Blocker.    escitalopram 5 MG Oral Tab Take 1 tablet (5 mg total) by mouth nightly.    FreeStyle System Does not apply Kit Contour NEXT meter kit       Medical History:  She  has a past medical history of Anxiety state, Bipolar affective (HCC), Breast cyst (1977), Cataract (2012), Congestive heart disease (HCC), COPD (chronic obstructive pulmonary disease) (HCC), Cup to disc asymmetry (2012), CVA (cerebral vascular accident) (HCC), Diabetes (HCC), Heart attack ([facility]) (2020), Hemorrhoids (2011), High blood pressure, High cholesterol, No diabetic retinopathy OU (2012), Shingles (2011), and Stroke ([facility]).  Surgical History:  She  has a past surgical history that includes Breast Surgery (1977); colonoscopy (2011); and kenneth localization wire 1 site left (cpt=19281).   Family History:  Her family history includes Diabetes in her mother and paternal grandmother; Heart Disease in her father.  Social History:  She  reports that she has quit smoking. She  has never used smokeless tobacco. She reports that she does not drink alcohol and does not use drugs.    Tobacco:  She smoked tobacco in the past but quit greater than 12 months ago.  Social History     Tobacco Use   Smoking Status Former   Smokeless Tobacco Never   Tobacco Comments    2 years        CAGE Alcohol Screen:   CAGE screening score of 0 on 11/7/2024, showing low risk of alcohol abuse.      Patient Care Team:  Miguel Figueroa DO as PCP - General (Family Practice)    Review of Systems   Constitutional: Negative.    HENT: Negative.     Eyes: Negative.    Respiratory: Negative.     Cardiovascular: Negative.    Gastrointestinal: Negative.    Genitourinary: Negative.    Musculoskeletal: Negative.    Skin: Negative.    Neurological: Negative.    Psychiatric/Behavioral: Negative.            Objective:   Physical Exam  Vitals reviewed.   Constitutional:       Appearance: Normal appearance. She is well-developed.   HENT:      Head: Normocephalic.      Right Ear: Hearing, tympanic membrane and ear canal normal.      Left Ear: Hearing, tympanic membrane and ear canal normal.      Nose: Nose normal.   Eyes:      Conjunctiva/sclera: Conjunctivae normal.      Pupils: Pupils are equal, round, and reactive to light.      Funduscopic exam:     Right eye: No hemorrhage or AV nicking.         Left eye: No hemorrhage or AV nicking.   Neck:      Thyroid: No thyroid mass or thyromegaly.   Cardiovascular:      Heart sounds: Normal heart sounds.   Pulmonary:      Breath sounds: Normal breath sounds.   Abdominal:      Tenderness: There is no abdominal tenderness.   Musculoskeletal:      Cervical back: Normal range of motion and neck supple.      Lumbar back: Normal.   Lymphadenopathy:      Upper Body:      Right upper body: No supraclavicular adenopathy.      Left upper body: No supraclavicular adenopathy.   Skin:     Comments: No suspicious findings waist up exam   Neurological:      Mental Status: She is alert and oriented to  person, place, and time.      Sensory: No sensory deficit.      Deep Tendon Reflexes: Reflexes are normal and symmetric.   Psychiatric:         Mood and Affect: Mood is not anxious or depressed.            /75   Pulse 75   Ht 5' 2\" (1.575 m)   Wt 129 lb (58.5 kg)   SpO2 97%   BMI 23.59 kg/m²  Estimated body mass index is 23.59 kg/m² as calculated from the following:    Height as of this encounter: 5' 2\" (1.575 m).    Weight as of this encounter: 129 lb (58.5 kg).    Medicare Hearing Assessment:   Hearing Screening    Screening Method: Questionnaire  I have a problem hearing over the telephone: No I have trouble following the conversations when two or more people are talking at the same time: No   I have trouble understanding things on the TV: No I have to strain to understand conversations: No   I have to worry about missing the telephone ring or doorbell: No I have trouble hearing conversations in a noisy background such as a crowded room or restaurant: No   I get confused about where sounds come from: No I misunderstand some words in a sentence and need to ask people to repeat themselves: No   Many people I talk to seem to mumble (or don't speak clearly): No People get annoyed because I misunderstand what they say: No   I misunderstand what others are saying and make inappropriate responses: No I avoid social activities because I cannot hear well and fear I will reply improperly: No   Family members and friends have told me they think I may have hearing loss: No                   Assessment & Plan:   Yessy Crodoba is a 69 year old female who presents for a Medicare Assessment.     1. Chronic systolic heart failure (HCC) (Primary)  Stable. Sees Cards.    2. Coronary artery disease involving native coronary artery of native heart without angina pectoris  Overview:  S/p MATTI to LAD 2/6/20  Asymptomatic. Seeing Card    3. Generalized anxiety disorder  Stable and sees Psych    4. Ischemic  cardiomyopathy  Seeing Card.  Stable.     5. Presence of cardiac defibrillator  No discharges.  Sees Card    6. Pure hypercholesterolemia  -     CBC With Differential With Platelet; Future; Expected date: 11/07/2024  Labs and treatment per Card    7. Severe bipolar I disorder, most recent episode depressed (HCC)  Stable. And sees Psych    8. Type 2 diabetes mellitus with hyperglycemia, with long-term current use of insulin (HCC)  Stable. Sees Endo    9. Age-related osteoporosis without current pathological fracture  CPM tolerating.  Not due for DEXA    10. Colon cancer screening  -     Occult Blood, Fecal, FIT Immunoassay; Future; Expected date: 11/07/2024  Other orders  -     Prevnar 20 (PCV20) [31802]    The patient indicates understanding of these issues and agrees to the plan.  Lab work ordered.  Reinforced healthy diet, lifestyle, and exercise.      Return in about 1 year (around 11/7/2025).     Miguel Figueroa DO, 11/7/2024     Supplementary Documentation:   General Health:  In the past six months, have you lost more than 10 pounds without trying?: 2 - No  Has your appetite been poor?: No  Type of Diet: Vegetarian;Low Salt;Low Carb  How would you describe your daily physical activity?: Light  How would you describe your current health state?: Good  How do you maintain positive mental well-being?: Social Interaction  On a scale of 0 to 10, with 0 being no pain and 10 being severe pain, what is your pain level?: 0 - (None)  In the past six months, have you experienced urine leakage?: 0-No  At any time do you feel concerned for the safety/well-being of yourself and/or your children, in your home or elsewhere?: No  Have you had any immunizations at another office such as Influenza, Hepatitis B, Tetanus, or Pneumococcal?: No    Health Maintenance   Topic Date Due    Zoster Vaccines (1 of 2) Never done    Colorectal Cancer Screening  06/28/2023    Influenza Vaccine (1) Never done    Annual Physical  11/06/2024     Diabetes Care Dilated Eye Exam  09/01/2025 (Originally 3/2/2024)    Diabetes Care A1C  12/13/2024    COVID-19 Vaccine (6 - 2023-24 season) 01/14/2025    Diabetes Care Foot Exam  06/13/2025    Diabetes Care: GFR  06/19/2025    Diabetes Care: Microalb/Creat Ratio  06/19/2025    Mammogram  07/30/2025    DEXA Scan  Completed    Annual Depression Screening  Completed    Fall Risk Screening (Annual)  Completed    Pneumococcal Vaccine: 65+ Years  Completed

## 2024-11-13 ENCOUNTER — OFFICE VISIT (OUTPATIENT)
Dept: ENDOCRINOLOGY CLINIC | Facility: CLINIC | Age: 69
End: 2024-11-13

## 2024-11-13 ENCOUNTER — TELEPHONE (OUTPATIENT)
Dept: ENDOCRINOLOGY CLINIC | Facility: CLINIC | Age: 69
End: 2024-11-13

## 2024-11-13 VITALS
HEART RATE: 79 BPM | HEIGHT: 62 IN | DIASTOLIC BLOOD PRESSURE: 76 MMHG | SYSTOLIC BLOOD PRESSURE: 119 MMHG | WEIGHT: 129 LBS | BODY MASS INDEX: 23.74 KG/M2

## 2024-11-13 DIAGNOSIS — Z79.4 TYPE 2 DIABETES MELLITUS WITHOUT COMPLICATION, WITH LONG-TERM CURRENT USE OF INSULIN (HCC): Primary | ICD-10-CM

## 2024-11-13 DIAGNOSIS — E11.9 TYPE 2 DIABETES MELLITUS WITHOUT COMPLICATION, WITH LONG-TERM CURRENT USE OF INSULIN (HCC): Primary | ICD-10-CM

## 2024-11-13 LAB
GLUCOSE BLOOD: 176
HEMOGLOBIN A1C: 7.4 % (ref 4.3–5.6)
TEST STRIP LOT #: NORMAL NUMERIC

## 2024-11-13 PROCEDURE — 83036 HEMOGLOBIN GLYCOSYLATED A1C: CPT | Performed by: NURSE PRACTITIONER

## 2024-11-13 PROCEDURE — 99213 OFFICE O/P EST LOW 20 MIN: CPT | Performed by: NURSE PRACTITIONER

## 2024-11-13 PROCEDURE — 82947 ASSAY GLUCOSE BLOOD QUANT: CPT | Performed by: NURSE PRACTITIONER

## 2024-11-13 RX ORDER — INSULIN GLARGINE AND LIXISENATIDE 100; 33 U/ML; UG/ML
20 INJECTION, SOLUTION SUBCUTANEOUS DAILY
Qty: 18 ML | Refills: 0 | Status: SHIPPED | OUTPATIENT
Start: 2024-11-13

## 2024-11-13 NOTE — TELEPHONE ENCOUNTER
Received letter from pt with diabetes blood glucose record dated on 10/16- 10/29. Placed in providers folder for review.

## 2024-11-13 NOTE — PROGRESS NOTES
Name: Yessy Cordoba  Date: 2024    CHIEF COMPLAINT   Chief Complaint   Patient presents with    Diabetes     Diabetic follow up     HISTORY OF PRESENT ILLNESS   Yessy Cordoba is a 69 year old female who presents for follow up on diabetes management. Patient is accompanied by her , Jose Alberto (), who is also helping answer questions.   HbA1C: 7.4% at POC today. Previously was 7.1% on 2024.   Blood glucose is: 176 in clinic today.   Patient notes that she has been feeling well and denies any health changes or taking new medications since last office visit. She continues to follow a low carb diet and staying active per usual. She has been compliant with taking all diabetes medications.     DIABETES HISTORY  Diagnosed: approx. 9 years ago, was prediabetic and had attended classes at [facility]. At that time was testing her BG readings, however she was told that she was fine and so she stopped checking since.   During hospitalization on 2020 for STEMI she was diagnosed with diabetes.     Prior HbA, C or glycohemoglobin were 12.7% 2020; 7.9% 2020; 8.6% 2020; 8.0% 2020; 8.4% 3/2021; 8.0% 21; 7.7% 2021; 8.6% 2022; 7.9% 2022; 7.7% 2023; 8.0% 8/3/2023; 8.3% 2023; 8.0% 3/7/2024; 7.1% 2024; 7.4% at POC today;     Patient has not had hospitalizations for blood sugar issues and denies any history of pancreatitis    PREVIOUS MEDICATION FOR DM:  Invokanna -d/c'd due to high cost  Tresiba - d/c'ed due to transition to soliqua   Glipizide -d/c'ed due to transition to soliqua     CURRENT MEDICATIONS FOR DM:  -Soliqua 22 units once daily in AM   -Metformin ER 500mg with dinner -tolerating well; denies GI s/e - was not able to tolerate higher dose due to GI s/e    HOME GLUCOSE READINGS:  Testing BG x 1 daily  Log book today: yes  Fastin, 121, 130, 107, 75, 126  Before dinner: 122, 141, 106, 172, 139, 409    HISTORY OF DIABETES  COMPLICATIONS:  History of Retinopathy: no - last eye exam within the last 12 months: yes-last exam was on  10/5/2023 with Dr. Boston ( [facility])    History of Neuropathy: no   History of Nephropathy: no     ASSOCIATED COMPLICATIONS:   HTN: no   Hyperlipidemia: yes- on statin   Coronary Artery Disease:  Yes -STEMI  Cerebrovascular Disease: no     DIETARY COMPLIANCE:  Fair; ties to eat a low carb diet     EXERCISE:   Yes - walking daily recently (on occasion going to the [email or web address] for walking) 3x weekly     Polyuria, polyphagia, polydipsia: no  Paresthesias: no  Blurred vision: no  Recent steroids, illness or infections: no     REVIEW OF SYSTEMS  Constitutional: Negative for: weight change, fever, fatigue, cold/heat intolerance  Eyes: Negative for:  Visual changes, proptosis, blurring  Respiratory: Negative for: hemoptysis, shortness of breath, cough, or dyspnea.  Cardiovascular: Negative for:  chest pain, chest discomfort, palpitations  GI: Negative for:  abdominal pain, nausea, vomiting, diarrhea, heartburn, constipation, bleeding  Neurology: Negative for: headache, dizziness, syncope, numbness/tingling, or weakness.   Genito-Urinary: Negative for: dysuria, frequency or hematuria   Hematology/Lymphatics: Negative for: bruising, easy bleeding, lower extremity edema  Endocrine: Negative for: polyuria, polydipsia. No thyroid disease. No osteoporosis.   Skin: Negative for: rash, blister, infection or ulcers.    MEDICATIONS:     Current Outpatient Medications:     ONETOUCH ULTRA In Vitro Strip, USE TWICE DAILY, Disp: 200 strip, Rfl: 1    ONETOUCH DELICA PLUS [id number] Does not apply Misc, USE 1 STRIP FOR TESTING TWICE DAILY, Disp: 200 each, Rfl: 1    Insulin Glargine-Lixisenatide (SOLIQUA) 100-33 UNT-MCG/ML Subcutaneous Solution Pen-injector, Inject 22 Units/day into the skin daily., Disp: 21 mL, Rfl: 1    METFORMIN  MG Oral Tablet 24 Hr, TAKE 2 TABLETS(1000 MG) BY MOUTH DAILY WITH DINNER, Disp: 180  tablet, Rfl: 0    BD PEN NEEDLE KRYSTAL 2ND GEN 32G X 4 MM Does not apply Misc, USE DAILY AS DIRECTED, Disp: 100 each, Rfl: 1    alendronate 35 MG Oral Tab, Take 1 tablet (35 mg total) by mouth every 7 days., Disp: 4 tablet, Rfl: 11    Glucose Blood (ONETOUCH ULTRA) In Vitro Strip, USE AS DIRECTED TWICE DAILY, Disp: 200 strip, Rfl: 1    Microlet Lancets Does not apply Misc, Check blood sugar twice daily, Disp: 200 each, Rfl: 0    spironolactone 25 MG Oral Tab, Take 1 tablet (25 mg total) by mouth daily., Disp: , Rfl:     atorvastatin 80 MG Oral Tab, Take 1 tablet (80 mg total) by mouth nightly., Disp: 30 tablet, Rfl: 3    aspirin 81 MG Oral Tab EC, Take 1 tablet (81 mg total) by mouth daily., Disp: 30 tablet, Rfl: 3    Metoprolol Succinate ER 50 MG Oral Tablet 24 Hr, Take 1 tablet (50 mg total) by mouth Daily Beta Blocker., Disp: 30 tablet, Rfl: 3    escitalopram 5 MG Oral Tab, Take 1 tablet (5 mg total) by mouth nightly., Disp: 30 tablet, Rfl: 0    FreeStyle System Does not apply Kit, Contour NEXT meter kit, Disp: 1 kit, Rfl: 0  ALLERGIES:   Allergies   Allergen Reactions    Metformin OTHER (SEE COMMENTS) and UNKNOWN     Causes vomiting        SOCIAL HISTORY:   Social History     Socioeconomic History    Marital status:    Tobacco Use    Smoking status: Former    Smokeless tobacco: Never    Tobacco comments:     2 years   Vaping Use    Vaping status: Never Used   Substance and Sexual Activity    Alcohol use: No    Drug use: No   Other Topics Concern    Caffeine Concern No     PAST MEDICAL HISTORY:   Past Medical History:    Anxiety state    Bipolar affective (HCC)    Breast cyst    Cataract    Congestive heart disease (HCC)    COPD (chronic obstructive pulmonary disease) (HCC)    Cup to disc asymmetry    increased C/D ratio    CVA (cerebral vascular accident) (HCC)    Diabetes (HCC)    Heart attack (HCC)    Pacemaker    Hemorrhoids    High blood pressure    High cholesterol    No diabetic retinopathy OU     Shingles    Stroke (HCC)     PAST SURGICAL HISTORY:   Past Surgical History:   Procedure Laterality Date    Breast surgery  1977    excision breast cyst    Colonoscopy  2011    Kp localization wire 1 site left (cpt=19281)         PHYSICAL EXAM:   Vitals:    11/13/24 1131   BP: 119/76   BP Location: Right arm   Patient Position: Sitting   Cuff Size: adult   Pulse: 79   Weight: 129 lb (58.5 kg)   Height: 5' 2\" (1.575 m)     BMI:   Body mass index is 23.59 kg/m².      General Appearance:  alert, well developed, in no acute distress  Nutritional:  no extreme weight gain or loss  Head: Atraumatic  Eyes:  normal conjunctivae, sclera., normal sclera and normal pupils  Throat/Neck: normal sound to voice. Normal hearing, normal speech  Back: no kyphosis  Respiratory:  Speaking in full sentences, non-labored. no increased work of breathing, no audible wheezing    Skin:  normal moisture and skin texture, no visible lesions  Hair and nails: normal scalp hair  Hematologic:  no excessive bruising  Neuro: motor grossly intact, moving all extremities without difficulty  Psychiatric:  oriented to time, self, and place  Extremities: no obvious extremity swelling, no lesions      Labs: Pertinent labs reviewed    ASSESSMENT/PLAN:    -Reviewed with patient the pathogenesis of diabetes, clinical significance of A1c, and common complications such as: microvascular, macrovascular and diabetic ketoacidosis. Patient verbalizes understanding of the importance of glycemic control and the goals of therapy.   Per ADA 2024 guidelines: older adults (ages 65 and above) who have multiple coexisting chronic illnesses (3 or more; chronic illness may include: arthritis, cancer, congestive heart failure, depression, emphysema, falls, hypertension, incontinence, stage 3 or worse chronic kidney disease, myocardial infarction, and stroke), or have mild to moderate cognitive impairment, and/or have +2 functional dependence should have less-stringent  glycemic goals. A1C target goal: <8.0%; fasting blood sugar readings  and bedtime blood sugar readings 100-180.   1.Type 2 Diabetes Mellitus, controlled   -LAB DATA  HbA,C:  7.4% today   a) Medications  -continue with Soliqua 22 units once daily in AM (before first meal)   - continue with Metformin ER 500mg with dinner meal     If able to take Farxiga:   -continue with Soliqua 22 units daily   -stop Metformin   - start Farxiga 10mg daily       -discussed to continue to follow a low carb diet   -discussed to increase walking to 5x weekly   -reviewed target goal BG readings and A1C  -reviewed when to call with abnormal BG readings.   -unable to use glp- 1 due to high cost.   - does not qualify for patient assistance programs for medications    b) Nephropathy: GFR 62 on 2024 and urine MA: <0.3mg/dL on 2024  c) UTD with annual eye exam with optho. - followed by Dr. Boston; last exam was on 10/5/2023  d) continue testing BG reading 1x daily -alternate with fasting and before dinner   E)foot exam: normal on 2024  f) Life style changes reviewed.     2. Hypertension   -on metoprolol succinate ER 50mg daily, valsartan and spironolactone 12.5mg daily   -normotensive today     -followed by cardiology      3.hyperlipidemia   -LDL: 51 and tri on 2024  -on atorvastatin 80mg daily       RTC in 4 months   Patient instructed to call sooner if they develop Blood glucose readings <75 and/or if they have readings persistently >200.     The risks and benefits of my recommendations were discussed with the patient today. Questions were also answered to the best of my knowledge. Patient verbalizes understanding of these issues and agrees to the plan.    2024  LACEY Louis

## 2024-11-13 NOTE — TELEPHONE ENCOUNTER
Endocrine refill protocol for rapid acting, regular, intermediate, and mixed insulin:    Protocol Criteria:  PASSED Reason: N/A    If all below requirements are met, send a 90-day supply with 1 refill per provider protocol.    Verify appointment with Endocrinology completed in the last 6 months or scheduled in the next 3 months.  Verify A1C has been completed within the last 6 months and is below 8.5%    -May substitute prescriptions for Novolog and Humalog unless documented allergy (pens and vials) at the same dose and concentration per insurance preference and provider protocol.   -May substitute prescriptions for Novolin R and Humulin R unless documented allergy (pens and vials) at the same dose and concentration per insurance preference and provider protocol.   -May substitute prescriptions for Novolin N and Humulin N unless documented allergy (pens and vials) at the same dose and concentration per insurance preference and provider protocol.   -May substitute prescriptions for Humulin and Novolin 70/30 insulin unless documented allergy at the same dose and concentration per insurance preference and provider protocol.    Last completed office visit: 6/13/2024 Sherita Anaya APRN   Next scheduled Follow up:   Future Appointments   Date Time Provider Department Center          3/12/2025 11:45 AM Sherita Anaya APRN [facility]      Last A1C result: 7.4% done 11/13/2024.

## 2024-11-13 NOTE — PATIENT INSTRUCTIONS
A1C: 7.4% today --> previously was 7.1% on 6/13/2024  Blood glucose: 176 in clinic today    Medications:   -continue with Soliqua 22 units once daily in AM (before first meal)   - continue with Metformin ER 500mg with dinner meal     - increase walking to 5x weekly   - continue to follow a low carb diet       If able to take Farxiga:   -take Soliqua 22 units daily   -stop Metformin   - start Farxiga 10mg daily       Weight:  Wt Readings from Last 6 Encounters:   11/13/24 129 lb (58.5 kg)   11/07/24 129 lb (58.5 kg)   06/13/24 128 lb (58.1 kg)   05/20/24 124 lb 3.2 oz (56.3 kg)   03/07/24 128 lb (58.1 kg)   11/09/23 124 lb (56.2 kg)       A1C goal:  <7.0%    Blood sugar testing:  Test your blood sugar 1 time daily   Recommended times to test: alternate with before breakfast (fasting) or 2hrs after meals     Blood sugar targets:  Before breakfast:  (preferably < 110)  2 hours after meals: <150     Call for persistent blood sugars < 75 or > 200

## 2024-11-14 ENCOUNTER — LAB ENCOUNTER (OUTPATIENT)
Dept: LAB | Age: 69
End: 2024-11-14
Attending: FAMILY MEDICINE
Payer: MEDICARE

## 2024-11-14 DIAGNOSIS — E78.00 PURE HYPERCHOLESTEROLEMIA: ICD-10-CM

## 2024-11-14 DIAGNOSIS — Z12.11 COLON CANCER SCREENING: ICD-10-CM

## 2024-11-14 LAB
BASOPHILS # BLD AUTO: 0.09 X10(3) UL (ref 0–0.2)
BASOPHILS NFR BLD AUTO: 1 %
DEPRECATED RDW RBC AUTO: 48.9 FL (ref 35.1–46.3)
EOSINOPHIL # BLD AUTO: 0.25 X10(3) UL (ref 0–0.7)
EOSINOPHIL NFR BLD AUTO: 2.8 %
ERYTHROCYTE [DISTWIDTH] IN BLOOD BY AUTOMATED COUNT: 14.5 % (ref 11–15)
HCT VFR BLD AUTO: 38.9 %
HEMOCCULT STL QL: NEGATIVE
HGB BLD-MCNC: 13.2 G/DL
IMM GRANULOCYTES # BLD AUTO: 0.04 X10(3) UL (ref 0–1)
IMM GRANULOCYTES NFR BLD: 0.4 %
LYMPHOCYTES # BLD AUTO: 2.44 X10(3) UL (ref 1–4)
LYMPHOCYTES NFR BLD AUTO: 27.4 %
MCH RBC QN AUTO: 31.2 PG (ref 26–34)
MCHC RBC AUTO-ENTMCNC: 33.9 G/DL (ref 31–37)
MCV RBC AUTO: 92 FL
MONOCYTES # BLD AUTO: 0.73 X10(3) UL (ref 0.1–1)
MONOCYTES NFR BLD AUTO: 8.2 %
NEUTROPHILS # BLD AUTO: 5.35 X10 (3) UL (ref 1.5–7.7)
NEUTROPHILS # BLD AUTO: 5.35 X10(3) UL (ref 1.5–7.7)
NEUTROPHILS NFR BLD AUTO: 60.2 %
PLATELET # BLD AUTO: 179 10(3)UL (ref 150–450)
RBC # BLD AUTO: 4.23 X10(6)UL
WBC # BLD AUTO: 8.9 X10(3) UL (ref 4–11)

## 2024-11-14 PROCEDURE — 85025 COMPLETE CBC W/AUTO DIFF WBC: CPT

## 2024-11-14 PROCEDURE — 82274 ASSAY TEST FOR BLOOD FECAL: CPT

## 2024-11-14 PROCEDURE — 36415 COLL VENOUS BLD VENIPUNCTURE: CPT

## 2024-11-20 ENCOUNTER — TELEPHONE (OUTPATIENT)
Dept: ENDOCRINOLOGY CLINIC | Facility: CLINIC | Age: 69
End: 2024-11-20

## 2024-11-20 NOTE — TELEPHONE ENCOUNTER
Letter received in the mail from patient notifying us that soliqua PA will  on 24.     Please start new PA for soliqua to avoid care gap.     Thank you!

## 2024-12-04 ENCOUNTER — TELEPHONE (OUTPATIENT)
Dept: ENDOCRINOLOGY CLINIC | Facility: CLINIC | Age: 69
End: 2024-12-04

## 2024-12-04 NOTE — TELEPHONE ENCOUNTER
Received mailed copy of pt's blood sugars for the month of November.   Blood sugar log, placed in provider's bin for review.

## 2024-12-14 RX ORDER — PEN NEEDLE, DIABETIC 32GX 5/32"
1 NEEDLE, DISPOSABLE MISCELLANEOUS AS DIRECTED
Qty: 100 EACH | Refills: 1 | Status: SHIPPED | OUTPATIENT
Start: 2024-12-14

## 2024-12-14 NOTE — TELEPHONE ENCOUNTER
Endocrine Refill protocol for Glucose testing supplies     Protocol Criteria: PASSED Reason: N/A    If below requirement is met, send a 90-day supply with 1 refill per provider protocol.    Verify appointment with Endocrinology completed in the last 6 months or scheduled in the next 3 months.    Last completed office visit: 11/13/2024 Sherita Anaya APRN   Next scheduled Follow up:   Future Appointments   Date Time Provider Department Center   12/24/2024 11:10 AM Radha Hagen MD [facility]   3/12/2025 11:45 AM Sherita Anaya APRN [facility]

## 2024-12-24 ENCOUNTER — OFFICE VISIT (OUTPATIENT)
Dept: OTOLARYNGOLOGY | Facility: CLINIC | Age: 69
End: 2024-12-24

## 2024-12-24 VITALS — HEIGHT: 62 IN | WEIGHT: 129 LBS | BODY MASS INDEX: 23.74 KG/M2

## 2024-12-24 DIAGNOSIS — H61.23 CERUMEN DEBRIS ON TYMPANIC MEMBRANE OF BOTH EARS: Primary | ICD-10-CM

## 2024-12-24 PROCEDURE — 69210 REMOVE IMPACTED EAR WAX UNI: CPT | Performed by: SPECIALIST

## 2024-12-24 RX ORDER — CALCIUM CARBONATE/VITAMIN D3 500 MG-10
TABLET ORAL
COMMUNITY
Start: 2024-12-11

## 2025-01-10 ENCOUNTER — TELEPHONE (OUTPATIENT)
Dept: ENDOCRINOLOGY CLINIC | Facility: CLINIC | Age: 70
End: 2025-01-10

## 2025-01-14 NOTE — TELEPHONE ENCOUNTER
BG log reviewed and all glucose readings are stable. Please instruct patient to continue with current diabetes medications/doses. No changes.     Thank you

## 2025-01-27 RX ORDER — EMPAGLIFLOZIN 25 MG/1
25 TABLET, FILM COATED ORAL DAILY
Qty: 90 TABLET | Refills: 0 | Status: SHIPPED | OUTPATIENT
Start: 2025-01-27

## 2025-01-27 NOTE — TELEPHONE ENCOUNTER
Endocrine Refill protocol for oral and injectable diabetic medications    Protocol Criteria:  PASSED      If all below requirements are met, send a 90-day supply with 1 refill per provider protocol.    Verify appointment with Endocrinology completed in the last 6 months or scheduled in the next 3 months.  Verify A1C has been completed within the last 6 months and is below 8.5%     Last completed office visit: 11/13/2024 Sherita Anaya APRN   Next scheduled Follow up:   Future Appointments   Date Time Provider Department Center   3/12/2025 11:45 AM Sherita Anaya APRN [facility]      Last A1c result: Last A1c value was 7.4% done 11/13/2024.

## 2025-02-13 ENCOUNTER — TELEPHONE (OUTPATIENT)
Dept: INTERNAL MEDICINE CLINIC | Facility: CLINIC | Age: 70
End: 2025-02-13

## 2025-02-18 NOTE — TELEPHONE ENCOUNTER
[facility]  [address]  [address]  [place] [place] [address]  Phone:  [phone]  Fax: [phone]       Chief Complaint   Patient presents with    Medicare AW   .      SUBJECTIVE:      History of Present Illness  The patient presents for evaluation of diabetes mellitus, hypertension, and nocturia.    She reports overall good health, with the exception of her blood glucose levels, which have been unmonitored due to a lack of a device. She is currently on a regimen of 80 units of insulin, administered in the morning, although her prescription indicates nighttime administration. She has been experiencing balance issues, which she attributes to her diabetes.    She has recently started taking carvedilol 3.125 mg and has been on Entresto for an extended period, with a recent increase in dosage. She reports no shortness of breath.    She experiences frequent urination at night, up to four times, and also during the day. She is currently on Myrbetriq 50 mg for this issue.    SOCIAL HISTORY  The patient stopped smoking 10 years ago.    MEDICATIONS  Current: Insulin, carvedilol, Entresto, Myrbetriq         Current Outpatient Medications   Medication Sig Dispense Refill    carvedilol (COREG) 3.125 MG tablet TAKE 1 TABLET BY MOUTH TWICE A  tablet 1    FARXIGA 10 MG tablet TAKE 1 TABLET BY MOUTH EVERY DAY 30 tablet 4    spironolactone (ALDACTONE) 25 MG tablet TAKE 1 TABLET BY MOUTH EVERY DAY 90 tablet 1    isosorbide mononitrate (IMDUR) 30 MG extended release tablet TAKE 1 TABLET BY MOUTH EVERY DAY IN THE MORNING 90 tablet 1    allopurinol (ZYLOPRIM) 100 MG tablet TAKE 1 TABLET BY MOUTH EVERY DAY 90 tablet 3    sacubitril-valsartan (ENTRESTO) 49-51 MG per tablet Take 1 tablet by mouth 2 times daily 60 tablet 3    Insulin Pen Needle (PEN NEEDLES 31GX5/16\") 31G X 8 MM MISC Use to inject insulin daily. Dx.e11.9 100 each 11    insulin glargine (LANTUS SOLOSTAR) 100 UNIT/ML injection pen  RN spoke with  (EC) at length about note below.   verbalized understanding to take Tresiba 10 units in the morning and add new medication, Glipizide 2.5 mg (1/2 tab) daily always WITH food/breakfast.  wrote down instructions and read ba

## 2025-02-20 ENCOUNTER — TELEPHONE (OUTPATIENT)
Dept: ENDOCRINOLOGY CLINIC | Facility: CLINIC | Age: 70
End: 2025-02-20

## 2025-02-20 NOTE — TELEPHONE ENCOUNTER
Received diabetes blood glucose record dated from 1/12/25- 2/2/25. Placed in providers folder for review.

## 2025-02-20 NOTE — TELEPHONE ENCOUNTER
BG reading log reviewed and readings are all stable.     Fasting ranges between 80s- 110s  Before dinner: 100sto 110s; occ 130;151    Please instruct patient to continue with current medication regimen. No changes needed.      Thank you!

## 2025-02-24 NOTE — TELEPHONE ENCOUNTER
Spoke to pt. Provided recommendations written below by LACEY. She verbalized understanding, denies further questions or concerns.

## 2025-03-12 ENCOUNTER — OFFICE VISIT (OUTPATIENT)
Dept: ENDOCRINOLOGY CLINIC | Facility: CLINIC | Age: 70
End: 2025-03-12

## 2025-03-12 VITALS
SYSTOLIC BLOOD PRESSURE: 107 MMHG | DIASTOLIC BLOOD PRESSURE: 74 MMHG | HEART RATE: 79 BPM | WEIGHT: 125 LBS | HEIGHT: 62 IN | BODY MASS INDEX: 23 KG/M2

## 2025-03-12 DIAGNOSIS — E11.9 TYPE 2 DIABETES MELLITUS WITHOUT COMPLICATION, WITH LONG-TERM CURRENT USE OF INSULIN (HCC): Primary | ICD-10-CM

## 2025-03-12 DIAGNOSIS — E78.00 PURE HYPERCHOLESTEROLEMIA: ICD-10-CM

## 2025-03-12 DIAGNOSIS — Z79.4 TYPE 2 DIABETES MELLITUS WITHOUT COMPLICATION, WITH LONG-TERM CURRENT USE OF INSULIN (HCC): Primary | ICD-10-CM

## 2025-03-12 LAB
GLUCOSE BLOOD: 132
HEMOGLOBIN A1C: 7 % (ref 4.3–5.6)
TEST STRIP LOT #: NORMAL NUMERIC

## 2025-03-12 PROCEDURE — 99213 OFFICE O/P EST LOW 20 MIN: CPT | Performed by: NURSE PRACTITIONER

## 2025-03-12 PROCEDURE — 83036 HEMOGLOBIN GLYCOSYLATED A1C: CPT | Performed by: NURSE PRACTITIONER

## 2025-03-12 PROCEDURE — 82947 ASSAY GLUCOSE BLOOD QUANT: CPT | Performed by: NURSE PRACTITIONER

## 2025-03-12 NOTE — PROGRESS NOTES
Name: Yessy Cordoba  Date: 3/12/2025    CHIEF COMPLAINT   Chief Complaint   Patient presents with    Diabetes     Patient is in to follow up on diabetes, A1c check up       HISTORY OF PRESENT ILLNESS   Yessy Cordoba is a 70 year old female who presents for follow up on diabetes management. Patient is accompanied by her , Jose Alberto (), who is also helping answer questions.   HbA1C: 7.0% at POC today. Previously was 7.4% on 2024.   Blood glucose is: 132 in clinic today.   Patient notes that she has been feeling well and denies any health changes or taking new medications since last office visit. She continues to follow a low carb diet and staying active per usual. She has been compliant with taking all diabetes medications.     DIABETES HISTORY  Diagnosed: approx. 9 years ago, was prediabetic and had attended classes at [facility]. At that time was testing her BG readings, however she was told that she was fine and so she stopped checking since.   During hospitalization on 2020 for STEMI she was diagnosed with diabetes.     Prior HbA, C or glycohemoglobin were 12.7% 2020; 7.9% 2020; 8.6% 2020; 8.0% 2020; 8.4% 3/2021; 8.0% 21; 7.7% 2021; 8.6% 2022; 7.9% 2022; 7.7% 2023; 8.0% 8/3/2023; 8.3% 2023; 8.0% 3/7/2024; 7.1% 2024; 7.4% 2024; 7.0% at POC today;     Patient has not had hospitalizations for blood sugar issues and denies any history of pancreatitis    PREVIOUS MEDICATION FOR DM:  Invokanna -d/c'd due to high cost  Tresiba - d/c'ed due to transition to soliqua   Glipizide -d/c'ed due to transition to soliqua   Metformin - was only able to tolerate 500mg dose; developed GI intolerance symptoms with higher doses.     CURRENT MEDICATIONS FOR DM:  -Soliqua 22 units once daily in AM   -Jardiance 25mg once daily in the morning     HOME GLUCOSE READINGS:  Testing BG x 1 daily  Log book today: yes  Fastin, 114, 101, 96, 117, 121,  117  Before dinner: 120, 140, 112, 114, 119, 107, 115  Hypoglycemia: no - denies any symptoms of hypoglycemia     HISTORY OF DIABETES COMPLICATIONS:  History of Retinopathy: no - last eye exam within the last 12 months: yes-last exam was on 8/29/2024 with Dr. Boston ( [facility])    History of Neuropathy: no   History of Nephropathy: no     ASSOCIATED COMPLICATIONS:   HTN: no   Hyperlipidemia: yes- on statin   Coronary Artery Disease:  Yes -STEMI  Cerebrovascular Disease: no     DIETARY COMPLIANCE:  Breakfast around 10am: cereal; 2 pieces of jelly toast; tuna; salami with cheese; scrambled eggs; knowles   Lunch: skipping meal   Dinner around 5pm: pizza or steak; pork chops; spaghetti; chili; chicken sandwich; BLT sandwich; tuna plate; hot dog     -Occ eating sweets; sugar free items usually  - sugar free beverage; denies drinking soda     EXERCISE:   Yes - walking daily (weather permitting) - 1/4 mile   - walks around [facility] 2-3x weekly      Polyuria, polyphagia, polydipsia: no  Paresthesias: no  Blurred vision: no  Recent steroids, illness or infections: no     REVIEW OF SYSTEMS  Constitutional: Negative for: weight change, fever, fatigue, cold/heat intolerance  Eyes: Negative for:  Visual changes, proptosis, blurring  Respiratory: Negative for: hemoptysis, shortness of breath, cough, or dyspnea.  Cardiovascular: Negative for:  chest pain, chest discomfort, palpitations  GI: Negative for:  abdominal pain, nausea, vomiting, diarrhea, heartburn, constipation, bleeding  Neurology: Negative for: headache, dizziness, syncope, numbness/tingling, or weakness.   Genito-Urinary: Negative for: dysuria, frequency or hematuria   Hematology/Lymphatics: Negative for: bruising, easy bleeding, lower extremity edema  Endocrine: Negative for: polyuria, polydipsia. No thyroid disease. No osteoporosis.   Skin: Negative for: rash, blister, infection or ulcers.    MEDICATIONS:     Current Outpatient Medications:      JARDIANCE 25 MG Oral Tab, TAKE 1 TABLET(25 MG) BY MOUTH DAILY, Disp: 90 tablet, Rfl: 0    Calcium Carb-Cholecalciferol 500-10 MG-MCG Oral Tab, Calcium 500 + D 500 mg-10 mcg (400 unit) tablet, [RxNorm: 223693], Disp: , Rfl:     BD PEN NEEDLE KRYSTAL 2ND GEN 32G X 4 MM Does not apply Misc, USE ONCE DAILY AS DIRECTED., Disp: 100 each, Rfl: 1    SOLIQUA 100-33 UNT-MCG/ML Subcutaneous Solution Pen-injector, ADMINISTER 20 UNITS UNDER THE SKIN DAILY, Disp: 18 mL, Rfl: 0    ONETOUCH ULTRA In Vitro Strip, USE TWICE DAILY, Disp: 200 strip, Rfl: 1    ONETOUCH DELICA PLUS [id number] Does not apply Misc, USE 1 STRIP FOR TESTING TWICE DAILY, Disp: 200 each, Rfl: 1    METFORMIN  MG Oral Tablet 24 Hr, TAKE 2 TABLETS(1000 MG) BY MOUTH DAILY WITH DINNER, Disp: 180 tablet, Rfl: 0    alendronate 35 MG Oral Tab, Take 1 tablet (35 mg total) by mouth every 7 days., Disp: 4 tablet, Rfl: 11    Glucose Blood (ONETOUCH ULTRA) In Vitro Strip, USE AS DIRECTED TWICE DAILY, Disp: 200 strip, Rfl: 1    Microlet Lancets Does not apply Misc, Check blood sugar twice daily, Disp: 200 each, Rfl: 0    spironolactone 25 MG Oral Tab, Take 1 tablet (25 mg total) by mouth daily., Disp: , Rfl:     atorvastatin 80 MG Oral Tab, Take 1 tablet (80 mg total) by mouth nightly., Disp: 30 tablet, Rfl: 3    aspirin 81 MG Oral Tab EC, Take 1 tablet (81 mg total) by mouth daily., Disp: 30 tablet, Rfl: 3    Metoprolol Succinate ER 50 MG Oral Tablet 24 Hr, Take 1 tablet (50 mg total) by mouth Daily Beta Blocker., Disp: 30 tablet, Rfl: 3    escitalopram 5 MG Oral Tab, Take 1 tablet (5 mg total) by mouth nightly., Disp: 30 tablet, Rfl: 0    FreeStyle System Does not apply Kit, Contour NEXT meter kit, Disp: 1 kit, Rfl: 0  ALLERGIES:   Allergies   Allergen Reactions    Metformin OTHER (SEE COMMENTS) and UNKNOWN     Causes vomiting        SOCIAL HISTORY:   Social History     Socioeconomic History    Marital status:    Tobacco Use    Smoking status: Former    Smokeless  tobacco: Never    Tobacco comments:     2 years   Vaping Use    Vaping status: Never Used   Substance and Sexual Activity    Alcohol use: No    Drug use: No   Other Topics Concern    Caffeine Concern No     PAST MEDICAL HISTORY:   Past Medical History:    Anxiety state    Bipolar affective (HCC)    Breast cyst    Cataract    Congestive heart disease (HCC)    COPD (chronic obstructive pulmonary disease) (HCC)    Cup to disc asymmetry    increased C/D ratio    CVA (cerebral vascular accident) (HCC)    Diabetes (HCC)    Heart attack (HCC)    Pacemaker    Hemorrhoids    High blood pressure    High cholesterol    No diabetic retinopathy OU    Shingles    Stroke (HCC)     PAST SURGICAL HISTORY:   Past Surgical History:   Procedure Laterality Date    Breast surgery  1977    excision breast cyst    Colonoscopy  2011    Kp localization wire 1 site left (cpt=19281)         PHYSICAL EXAM:   Vitals:    03/12/25 1134   BP: 107/74   Pulse: 79   Weight: 125 lb (56.7 kg)   Height: 5' 2\" (1.575 m)     BMI:   Body mass index is 22.86 kg/m².      General Appearance:  alert, well developed, in no acute distress  Nutritional:  no extreme weight gain or loss  Head: Atraumatic  Eyes:  normal conjunctivae, sclera., normal sclera and normal pupils  Throat/Neck: normal sound to voice. Normal hearing, normal speech  Back: no kyphosis  Respiratory:  Speaking in full sentences, non-labored. no increased work of breathing, no audible wheezing    Skin:  normal moisture and skin texture, no visible lesions  Hair and nails: normal scalp hair  Hematologic:  no excessive bruising  Neuro: motor grossly intact, moving all extremities without difficulty  Psychiatric:  oriented to time, self, and place  Extremities: no obvious extremity swelling, no lesions    Diabetes Foot Exam:  Bilateral barefoot skin diabetic exam is normal, visualized feet and the appearance is normal.  Bilateral monofilament/sensation of both feet is normal.  Pulsation pedal pulse  exam of both lower legs/feet is normal as well.      Labs: Pertinent labs reviewed    ASSESSMENT/PLAN:    -Reviewed with patient the pathogenesis of diabetes, clinical significance of A1c, and common complications such as: microvascular, macrovascular and diabetic ketoacidosis. Patient verbalizes understanding of the importance of glycemic control and the goals of therapy.     1.Type 2 Diabetes Mellitus, controlled   -LAB DATA  HbA,C:  7.0% today   a) Medications  - continue with Soliqua 22 units daily   - continue with Jardiance 25mg daily in the morning     -discussed to continue to follow a low carb diet   -continue to exercise as currently doing   -reviewed target goal BG readings and A1C  -reviewed when to call with abnormal BG readings.   -unable to use glp- 1 due to high cost.   - does not qualify for patient assistance programs for medications    b) Nephropathy: GFR 62 on 2024 and urine MA: <0.3mg/dL on 2024 --> repeat labs before next f/u visit   c) UTD with annual eye exam with optho. - followed by Dr. Boston; last exam was on 2024  d) continue testing BG reading 1x daily -alternate with fasting and before dinner   E)foot exam: normal today 3/12/2025  f) Life style changes reviewed.     2. Hypertension   -on metoprolol succinate ER 50mg daily, valsartan and spironolactone 12.5mg daily   -normotensive today     -followed by cardiology      3.hyperlipidemia   -LDL: 51 and tri on 2024  -on atorvastatin 80mg daily   - repeat lipid panel before next f/u visit       RTC in 6 months   Patient instructed to call sooner if they develop Blood glucose readings <75 and/or if they have readings persistently >200.     The risks and benefits of my recommendations were discussed with the patient today. Questions were also answered to the best of my knowledge. Patient verbalizes understanding of these issues and agrees to the plan.    3/12/2025  LACEY Louis

## 2025-03-25 ENCOUNTER — TELEPHONE (OUTPATIENT)
Dept: ENDOCRINOLOGY CLINIC | Facility: CLINIC | Age: 70
End: 2025-03-25

## 2025-03-25 NOTE — TELEPHONE ENCOUNTER
Blood glucose log reviewed.     Glucose readings are all at goal. Please inform patient to continue with current medication regimen. No changes.       Thank you

## 2025-03-25 NOTE — TELEPHONE ENCOUNTER
Received Pt's Blood Glucose Records from 02/04/2025 to 02/26/2025 on 03/25/2025. Placed in Provider's folder for review.

## 2025-04-03 RX ORDER — ALENDRONATE SODIUM 35 MG/1
35 TABLET ORAL
Qty: 12 TABLET | Refills: 3 | Status: SHIPPED | OUTPATIENT
Start: 2025-04-03

## 2025-04-03 NOTE — TELEPHONE ENCOUNTER
Refill Per Protocol     Requested Prescriptions   Pending Prescriptions Disp Refills    ALENDRONATE 35 MG Oral Tab [Pharmacy Med Name: ALENDRONATE 35MG TABLETS] 4 tablet 11     Sig: TAKE 1 TABLET(35 MG) BY MOUTH EVERY 7 DAYS       Osteoporosis Medication Protocol Passed - 4/3/2025  4:31 PM        Passed - In person appointment or virtual visit in the past 6 mos or appointment in next 3 mos     Recent Outpatient Visits              3 weeks ago Type 2 diabetes mellitus without complication, with long-term current use of insulin ([facility])    [facility], Sherita Seo APRN    Office Visit    3 months ago Cerumen debris on tympanic membrane of both ears    [facility] Radha Hagen MD    Office Visit    4 months ago Type 2 diabetes mellitus without complication, with long-term current use of insulin ([facility])    [facility], Sherita Seo APRN    Office Visit    4 months ago Chronic systolic heart failure ([facility])    [facility] Miguel Figueroa DO    Office Visit    9 months ago Type 2 diabetes mellitus without complication, with long-term current use of insulin ([facility])    [facility] Sherita Anaya APRN    Office Visit          Future Appointments         Provider Department Appt Notes    In 2 months Radha Hagen MD [facility] f/u    In 5 months Sherita Anaya APRN [facility] 6 mo follow up  (policy informed)                    Passed - DEXA scan within past 2 years        Passed - CMP within the past 12 months        Passed - Calcium level between 8.3 and 10.3     Lab Results   Component Value Date    CA 9.5 06/19/2024               Passed - GFR level greater than 35     GFR Evaluation  EGFRCR: 62 , resulted on 6/19/2024           Passed - Medication is active on med list

## 2025-04-22 ENCOUNTER — TELEPHONE (OUTPATIENT)
Dept: ENDOCRINOLOGY CLINIC | Facility: CLINIC | Age: 70
End: 2025-04-22

## 2025-04-22 NOTE — TELEPHONE ENCOUNTER
Received Pt's Blood Glucose Records from 02/27/2025 to 03/21/2025 on 04/22/2025. Placed in Provider folder for review.

## 2025-04-28 RX ORDER — EMPAGLIFLOZIN 25 MG/1
25 TABLET, FILM COATED ORAL DAILY
Qty: 90 TABLET | Refills: 1 | Status: SHIPPED | OUTPATIENT
Start: 2025-04-28

## 2025-04-28 NOTE — TELEPHONE ENCOUNTER
Endocrine Refill protocol for oral and injectable diabetic medications    Protocol Criteria:  PASSED  Reason: N/A    If all below requirements are met, send a 90-day supply with 1 refill per provider protocol.    Verify appointment with Endocrinology completed in the last 6 months or scheduled in the next 3 months.  Verify A1C has been completed within the last 6 months and is below 8.5%     Last completed office visit: 3/12/2025 Sherita Anaya APRN   Next scheduled Follow up:   Future Appointments   Date Time Provider Department Center          9/12/2025 11:45 AM Sherita Anaya APRN [facility]      Last A1c result: Last A1c value was 7% done 3/12/2025.

## 2025-05-09 RX ORDER — BLOOD SUGAR DIAGNOSTIC
STRIP MISCELLANEOUS 2 TIMES DAILY
Qty: 200 STRIP | Refills: 1 | OUTPATIENT
Start: 2025-05-09

## 2025-05-12 ENCOUNTER — TELEPHONE (OUTPATIENT)
Dept: ENDOCRINOLOGY CLINIC | Facility: CLINIC | Age: 70
End: 2025-05-12

## 2025-05-14 RX ORDER — BLOOD SUGAR DIAGNOSTIC
1 STRIP MISCELLANEOUS 2 TIMES DAILY
Qty: 200 STRIP | Refills: 1 | Status: SHIPPED | OUTPATIENT
Start: 2025-05-14

## 2025-05-14 NOTE — TELEPHONE ENCOUNTER
LOV: 01/26/23     RTC: 6 Months     FU: 07/27/23      Last Refill: 12/01/22       3 Month Supply Pending
Loss of subcutaneous fat.../Loss of muscle...

## 2025-05-20 ENCOUNTER — TELEPHONE (OUTPATIENT)
Dept: ENDOCRINOLOGY CLINIC | Facility: CLINIC | Age: 70
End: 2025-05-20

## 2025-05-20 NOTE — TELEPHONE ENCOUNTER
BG readings reviewed and they are at goal.     Please instruct patient to continue with current medication regimen. No dose changes needed.     Thank you!

## 2025-05-20 NOTE — TELEPHONE ENCOUNTER
Received letter with Pt Diabetes Glucose Record from 03/22/2025 to 05/06/2025. Placed in Provider folder for review.

## 2025-05-21 NOTE — TELEPHONE ENCOUNTER
Attempted to call patient, phone rang and then went to busy signal. Tried again, and the same thing happened. Patient does not have  a my chart account.

## 2025-06-10 ENCOUNTER — TELEPHONE (OUTPATIENT)
Dept: ENDOCRINOLOGY CLINIC | Facility: CLINIC | Age: 70
End: 2025-06-10

## 2025-06-10 NOTE — TELEPHONE ENCOUNTER
Received letter dated 05/30/2025 from Pt with Diabetes Blood Glucose Record with readings dated from 05/07/2025 to 05/29/2025. Placed in Provider Folder for Review.

## 2025-07-02 RX ORDER — PEN NEEDLE, DIABETIC 32GX 5/32"
1 NEEDLE, DISPOSABLE MISCELLANEOUS AS DIRECTED
Qty: 100 EACH | Refills: 1 | Status: SHIPPED | OUTPATIENT
Start: 2025-07-02

## 2025-07-02 NOTE — TELEPHONE ENCOUNTER
Endocrine Refill protocol for Glucose testing supplies     Protocol Criteria: PASSED Reason: N/A    If below requirement is met, send a 90-day supply with 1 refill per provider protocol.    Verify appointment with Endocrinology completed in the last 6 months or scheduled in the next 3 months.    Last completed office visit:3/12/2025 Sherita Anaya APRN   Last completed telemed visit: Visit date not found  Next scheduled Follow up:   Future Appointments   Date Time Provider Department Center          9/12/2025 11:45 AM Isufi, Marvina, APRN [facility]

## 2025-07-03 ENCOUNTER — TELEPHONE (OUTPATIENT)
Dept: ENDOCRINOLOGY CLINIC | Facility: CLINIC | Age: 70
End: 2025-07-03

## 2025-07-03 NOTE — TELEPHONE ENCOUNTER
Glucose readings reviewed     Fasting readings range in 80s to 120s  Before dinner: mostly in 110s-120s; did have one reading of 73 on 6/19.       Please clarify if anything different outside of the usual routine occurred on 6/19 when she had the low glucose reading.     Currently should be taking:  - Soliqua 22 units daily   - Jardiance 25mg daily in the morning       Thank you!   
Received Letter from Pt dated 06/25/2025 with Diabetes Blood Glucose Record dated from 05/30/2025 to 06/21/2025. Placed in Provider folder for review.  
non-distended

## 2025-07-22 ENCOUNTER — TELEPHONE (OUTPATIENT)
Dept: FAMILY MEDICINE CLINIC | Facility: CLINIC | Age: 70
End: 2025-07-22

## 2025-07-22 ENCOUNTER — TELEPHONE (OUTPATIENT)
Dept: ENDOCRINOLOGY CLINIC | Facility: CLINIC | Age: 70
End: 2025-07-22

## 2025-07-22 NOTE — TELEPHONE ENCOUNTER
Received Letter from Pt dated 07/15/2025 with Diabetes Blood Glucose Record dated from 06/22/2025 to 07/14/2025. Placed in Provider folder for review.

## 2025-07-24 RX ORDER — INSULIN GLARGINE AND LIXISENATIDE 100; 33 U/ML; UG/ML
22 INJECTION, SOLUTION SUBCUTANEOUS DAILY
Qty: 21 ML | Refills: 0 | Status: SHIPPED | OUTPATIENT
Start: 2025-07-24

## 2025-07-24 NOTE — TELEPHONE ENCOUNTER
Endocrine Refill protocol for oral and injectable diabetic medications    Protocol Criteria:  PASSED  Reason: N/A    If all below requirements are met, send a 90-day supply with 1 refill per provider protocol.    Verify appointment with Endocrinology completed in the last 6 months or scheduled in the next 3 months.  Verify A1C has been completed within the last 6 months and is below 8.5%     Last completed office visit: 3/12/2025 Sherita Anaya APRN   Next scheduled Follow up:   Future Appointments   Date Time Provider Department Center   8/12/2025 11:10 AM Radha Hagen MD [facility]   8/12/2025  3:40 PM [facility]TER [facility] [place]   9/12/2025 11:45 AM Sherita Anaya APRN [facility]      Last A1c result: Last A1c value was 7% done 3/12/2025.

## 2025-08-12 ENCOUNTER — OFFICE VISIT (OUTPATIENT)
Dept: OTOLARYNGOLOGY | Facility: CLINIC | Age: 70
End: 2025-08-12

## 2025-08-12 ENCOUNTER — HOSPITAL ENCOUNTER (OUTPATIENT)
Dept: MAMMOGRAPHY | Age: 70
Discharge: HOME | End: 2025-08-12
Attending: FAMILY MEDICINE

## 2025-08-12 VITALS — WEIGHT: 125 LBS | BODY MASS INDEX: 23 KG/M2 | HEIGHT: 62 IN

## 2025-08-12 DIAGNOSIS — H61.23 CERUMEN DEBRIS ON TYMPANIC MEMBRANE OF BOTH EARS: Primary | ICD-10-CM

## 2025-08-12 DIAGNOSIS — Z12.31 ENCOUNTER FOR SCREENING MAMMOGRAM FOR MALIGNANT NEOPLASM OF BREAST: ICD-10-CM

## 2025-08-12 PROCEDURE — 69210 REMOVE IMPACTED EAR WAX UNI: CPT | Performed by: SPECIALIST

## 2025-08-12 PROCEDURE — 77067 SCR MAMMO BI INCL CAD: CPT | Performed by: FAMILY MEDICINE

## 2025-08-12 PROCEDURE — 77063 BREAST TOMOSYNTHESIS BI: CPT | Performed by: FAMILY MEDICINE

## 2025-08-28 ENCOUNTER — LAB ENCOUNTER (OUTPATIENT)
Dept: LAB | Age: 70
End: 2025-08-28
Attending: NURSE PRACTITIONER

## 2025-08-28 DIAGNOSIS — Z79.4 TYPE 2 DIABETES MELLITUS WITHOUT COMPLICATION, WITH LONG-TERM CURRENT USE OF INSULIN (HCC): ICD-10-CM

## 2025-08-28 DIAGNOSIS — E11.9 TYPE 2 DIABETES MELLITUS WITHOUT COMPLICATION, WITH LONG-TERM CURRENT USE OF INSULIN (HCC): ICD-10-CM

## 2025-08-28 LAB
ALBUMIN SERPL-MCNC: 4.5 G/DL (ref 3.2–4.8)
ALBUMIN/GLOB SERPL: 1.9 (ref 1–2)
ALP LIVER SERPL-CCNC: 98 U/L (ref 55–142)
ALT SERPL-CCNC: 29 U/L (ref 10–49)
ANION GAP SERPL CALC-SCNC: 8 MMOL/L (ref 0–18)
AST SERPL-CCNC: 26 U/L (ref ?–34)
BILIRUB SERPL-MCNC: 0.8 MG/DL (ref 0.2–1.1)
BUN BLD-MCNC: 21 MG/DL (ref 9–23)
BUN/CREAT SERPL: 17.2 (ref 10–20)
CALCIUM BLD-MCNC: 9.3 MG/DL (ref 8.7–10.4)
CHLORIDE SERPL-SCNC: 103 MMOL/L (ref 98–112)
CHOLEST SERPL-MCNC: 118 MG/DL (ref ?–200)
CO2 SERPL-SCNC: 29 MMOL/L (ref 21–32)
CREAT BLD-MCNC: 1.22 MG/DL (ref 0.55–1.02)
CREAT UR-SCNC: 129.9 MG/DL
EGFRCR SERPLBLD CKD-EPI 2021: 48 ML/MIN/1.73M2 (ref 60–?)
FASTING PATIENT LIPID ANSWER: YES
FASTING STATUS PATIENT QL REPORTED: YES
GLOBULIN PLAS-MCNC: 2.4 G/DL (ref 2–3.5)
GLUCOSE BLD-MCNC: 106 MG/DL (ref 70–99)
HDLC SERPL-MCNC: 42 MG/DL (ref 40–59)
LDLC SERPL CALC-MCNC: 59 MG/DL (ref ?–100)
MICROALBUMIN UR-MCNC: <0.3 MG/DL
NONHDLC SERPL-MCNC: 76 MG/DL (ref ?–130)
OSMOLALITY SERPL CALC.SUM OF ELEC: 293 MOSM/KG (ref 275–295)
POTASSIUM SERPL-SCNC: 4.2 MMOL/L (ref 3.5–5.1)
PROT SERPL-MCNC: 6.9 G/DL (ref 5.7–8.2)
SODIUM SERPL-SCNC: 140 MMOL/L (ref 136–145)
TRIGL SERPL-MCNC: 89 MG/DL (ref 30–149)
VLDLC SERPL CALC-MCNC: 13 MG/DL (ref 0–30)

## 2025-08-28 PROCEDURE — 36415 COLL VENOUS BLD VENIPUNCTURE: CPT

## 2025-08-28 PROCEDURE — 82570 ASSAY OF URINE CREATININE: CPT

## 2025-08-28 PROCEDURE — 82043 UR ALBUMIN QUANTITATIVE: CPT

## 2025-08-28 PROCEDURE — 80061 LIPID PANEL: CPT

## 2025-08-28 PROCEDURE — 80053 COMPREHEN METABOLIC PANEL: CPT

## 2025-08-29 ENCOUNTER — TELEPHONE (OUTPATIENT)
Dept: ENDOCRINOLOGY CLINIC | Facility: CLINIC | Age: 70
End: 2025-08-29

## 2025-08-29 DIAGNOSIS — Z79.4 TYPE 2 DIABETES MELLITUS WITHOUT COMPLICATION, WITH LONG-TERM CURRENT USE OF INSULIN (HCC): Primary | ICD-10-CM

## 2025-08-29 DIAGNOSIS — E11.9 TYPE 2 DIABETES MELLITUS WITHOUT COMPLICATION, WITH LONG-TERM CURRENT USE OF INSULIN (HCC): Primary | ICD-10-CM

## (undated) NOTE — LETTER
[facility], [place]  Authorization for Invasive Procedures  1. I hereby authorize  Dr. Mars Brothers , my physician and whomever may be designated as the doctor's assistant, to perform the following operation and/or procedure:  Insertion following are some, but not all, of the potential risks that can occur: fever and allergic reactions, hemolytic reactions, transmission of disease such as hepatitis, AIDS, cytomegalovirus (CMV), and flluid overload.  In the event that I wish to have autolog administration of sedation/analgesia as may be necessary or desirable in the judgment of my physician.      Signature of Patient:  ________________________________________________ Date: _________Time: _________    Responsible person in case of minor or unco

## (undated) NOTE — LETTER
July 16, 2022     [address] [place] [address]      Dear Luanne Gallego:    Below are the results from your recent visit:    Resulted Orders   [facility] ANJANA 2D+3D SCREENING BILAT ([id number]=[id number])    Narrative    PROCEDURE: [facility] ANJANA 2D+3D SCREENING BILAT ([id number])     COMPARISON: [address] [place] [address] Dr Miles, [facility] ANJANA 2D+3D SCREENING BILAT ([id number]), 6/29/2021, 2:12 PM.     INDICATIONS: Z12.31 Encounter for screening mammogram for malignant neoplasm of breast     TECHNIQUE:  Full field direct screening mammography was performed and images were reviewed with the R2 GÓMEZ [de-identified] CAD device. 3D tomosynthesis was performed and reviewed        BREAST COMPOSITION:   Category c-Heterogeneously dense, which may obscure small masses. There is no evidence of suspicious mass, tumor calcifications, tissue spiculation, skin thickening or nipple retraction. Scattered benign-appearing calcifications are present. Vascular calcifications are noted. No significant change has occurred. Impression    CONCLUSION:   Benign findings. No mammographic evidence for malignancy. Annual screening recommended. BI-RADS CATEGORY:     DIAGNOSTIC CATEGORY 2--BENIGN FINDING NO CHANGE FROM COMPARISON ASSESSMENT. RECOMMENDATIONS:   ROUTINE MAMMOGRAM AND CLINICAL EVALUATION IN 12 MONTHS. PLEASE NOTE: NORMAL MAMMOGRAM DOES NOT EXCLUDE THE POSSIBILITY OF BREAST CANCER. A CLINICALLY SUSPICIOUS PALPABLE LUMP SHOULD BE BIOPSIED. For patients over the age of 36, the target due date for the patient's next mammogram has been entered into a reminder system. Patient received a discharge summary from the technologist after completion of exam.     Breast marker legend used on images    Triangle = Palpable lump  Tangirnaq = Skin tag or mole  BB = Nipple  Linear rocio = Scar   Square = Pain            Dictated by (CST):  Robert Sherman MD on 7/13/2022 at 8:15 AM Finalized by (CST): Shannan Vargas MD on 7/13/2022 at 8:17 AM             The test are normal.  If you have any questions or concerns, please don't hesitate to call.      Jass Daft, DO

## (undated) NOTE — ED AVS SNAPSHOT
Aretta Romberg   MRN: [id number]    Department:  [facility] Emergency Department   Date of Visit:  3/10/2020           Disclosure     Insurance plans vary and the physician(s) referred by the ER may not be covered by your plan.  Please conta within the next three months to obtain basic health screening including reassessment of your blood pressure.     IF THERE IS ANY CHANGE OR WORSENING OF YOUR CONDITION, CALL YOUR PRIMARY CARE PHYSICIAN AT ONCE OR RETURN IMMEDIATELY TO THE EMERGENCY DEPARTMEN

## (undated) NOTE — LETTER
4/11/2024        Yessy Cordoba  [address] [place] [id number]               Dear Yessy,    Our records indicate that you are overdue for the following health screenings:      -Diabetic eye exam  -DEXA scan    Please call [phone] to schedule a follow-up appointment to discuss.    Thank you    Sincerely,    Miguel Figueroa, [address] [address] [phone]        Document electronically generated by:  Yumiko Kay MA